# Patient Record
Sex: MALE | Race: BLACK OR AFRICAN AMERICAN | NOT HISPANIC OR LATINO | URBAN - METROPOLITAN AREA
[De-identification: names, ages, dates, MRNs, and addresses within clinical notes are randomized per-mention and may not be internally consistent; named-entity substitution may affect disease eponyms.]

---

## 2020-09-17 ENCOUNTER — OUTPATIENT (OUTPATIENT)
Dept: OUTPATIENT SERVICES | Facility: HOSPITAL | Age: 20
LOS: 1 days | Discharge: STILL ADMITTED OR ADMINISTRATIVE | End: 2020-09-17
Payer: COMMERCIAL

## 2020-09-17 PROCEDURE — 90839 PSYTX CRISIS INITIAL 60 MIN: CPT

## 2020-09-18 DIAGNOSIS — F29 UNSPECIFIED PSYCHOSIS NOT DUE TO A SUBSTANCE OR KNOWN PHYSIOLOGICAL CONDITION: ICD-10-CM

## 2020-09-18 DIAGNOSIS — F12.20 CANNABIS DEPENDENCE, UNCOMPLICATED: ICD-10-CM

## 2020-09-25 PROCEDURE — 99214 OFFICE O/P EST MOD 30 MIN: CPT

## 2021-02-12 PROCEDURE — 90839 PSYTX CRISIS INITIAL 60 MIN: CPT

## 2021-02-16 ENCOUNTER — OUTPATIENT (OUTPATIENT)
Dept: OUTPATIENT SERVICES | Facility: HOSPITAL | Age: 21
LOS: 1 days | Discharge: STILL ADMITTED OR ADMINISTRATIVE | End: 2021-02-16
Payer: COMMERCIAL

## 2021-02-16 PROCEDURE — 90839 PSYTX CRISIS INITIAL 60 MIN: CPT

## 2021-02-17 DIAGNOSIS — F20.9 SCHIZOPHRENIA, UNSPECIFIED: ICD-10-CM

## 2021-02-23 ENCOUNTER — OUTPATIENT (OUTPATIENT)
Dept: OUTPATIENT SERVICES | Facility: HOSPITAL | Age: 21
LOS: 1 days | Discharge: HOME | End: 2021-02-23

## 2021-02-23 PROCEDURE — 90792 PSYCH DIAG EVAL W/MED SRVCS: CPT | Mod: 95

## 2021-02-24 DIAGNOSIS — F29 UNSPECIFIED PSYCHOSIS NOT DUE TO A SUBSTANCE OR KNOWN PHYSIOLOGICAL CONDITION: ICD-10-CM

## 2022-02-10 ENCOUNTER — INPATIENT (INPATIENT)
Facility: HOSPITAL | Age: 22
LOS: 13 days | Discharge: HOME | End: 2022-02-24
Attending: PSYCHIATRY & NEUROLOGY | Admitting: PSYCHIATRY & NEUROLOGY
Payer: COMMERCIAL

## 2022-02-10 VITALS — TEMPERATURE: 99 F | HEIGHT: 75 IN | WEIGHT: 315 LBS

## 2022-02-10 DIAGNOSIS — F29 UNSPECIFIED PSYCHOSIS NOT DUE TO A SUBSTANCE OR KNOWN PHYSIOLOGICAL CONDITION: ICD-10-CM

## 2022-02-10 LAB
FLUAV AG NPH QL: SIGNIFICANT CHANGE UP
FLUBV AG NPH QL: SIGNIFICANT CHANGE UP
RSV RNA NPH QL NAA+NON-PROBE: SIGNIFICANT CHANGE UP
SARS-COV-2 RNA SPEC QL NAA+PROBE: SIGNIFICANT CHANGE UP

## 2022-02-10 PROCEDURE — 99222 1ST HOSP IP/OBS MODERATE 55: CPT

## 2022-02-10 RX ORDER — OLANZAPINE 15 MG/1
10 TABLET, FILM COATED ORAL ONCE
Refills: 0 | Status: DISCONTINUED | OUTPATIENT
Start: 2022-02-10 | End: 2022-02-11

## 2022-02-10 RX ORDER — TRAZODONE HCL 50 MG
100 TABLET ORAL AT BEDTIME
Refills: 0 | Status: DISCONTINUED | OUTPATIENT
Start: 2022-02-10 | End: 2022-02-11

## 2022-02-10 RX ORDER — OLANZAPINE 15 MG/1
5 TABLET, FILM COATED ORAL EVERY 4 HOURS
Refills: 0 | Status: DISCONTINUED | OUTPATIENT
Start: 2022-02-10 | End: 2022-02-24

## 2022-02-10 RX ORDER — GABAPENTIN 400 MG/1
300 CAPSULE ORAL
Refills: 0 | Status: DISCONTINUED | OUTPATIENT
Start: 2022-02-10 | End: 2022-02-16

## 2022-02-10 RX ORDER — ARIPIPRAZOLE 15 MG/1
10 TABLET ORAL AT BEDTIME
Refills: 0 | Status: DISCONTINUED | OUTPATIENT
Start: 2022-02-10 | End: 2022-02-11

## 2022-02-10 RX ADMIN — Medication 2 MILLIGRAM(S): at 22:22

## 2022-02-10 RX ADMIN — Medication 100 MILLIGRAM(S): at 22:22
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
## 2022-02-10 NOTE — BH INPATIENT PSYCHIATRY ASSESSMENT NOTE - NSBHASSESSSUMMFT_PSY_ALL_CORE
22 y/o young man, single, unemployed, living with mother and cousin; no known PMH; PPhx of schizoaffective disorder and cannabis use disorder, [facility], no past suicide attempts, no past inpatient hospitalizations, previous trials on Abilify 5 mg and risperidone 1 mg BID although self-d/c'ed (patient had relief on risperidone), history of marijuana use with last use one year ago, history of aggression toward property referred [facility] for inability to care for self 2/2 psychotic sxs and imminent risk of harm 2/2 poor judgment from psychotic sxs.    Patient is presenting with signs and symptoms consistent with manic episode with psychotic features. On exam, patient is hyperverbal, tangential, with elevated affect, expressing grandiose statements. Patient also endorses decreased need for sleep. Patient is also disorganized with expressions of paranoia, ideas of reference, and auditory hallucinations. Symptoms are occurring in the setting of medication nonadherence with patient continuing to express hesitance toward medication initiation. Patient would benefit from initiation of Abilify for antipsychotic and mood stabilizing properties with option for DELONG, considering patient's history of medication nonadherence.    Plan:  1.	Legal: continue 9/13 status  2.	Safety: routine obs appropriate as pt denying active SI/HI; Zyprexa 5mg PO q6h PRN agitation, ativan 2mg q6h PO  PRN agitation, Zyprexa 10mg IM PRN severe agitation  3.	Psychiatric: Start Abilify 10mg QHS for psychosis, Start trazodone 100mg QHS for insomnia; PRN's: Neurontin 300mg PO QID PRN anxiety  4.	G/M therapy   5.	Medical: no acute issues; F/u basic AM labs and EKG  6.	Collateral/Dispo: Dispo when stable; Collateral can be obtained from Dr. Blanchard 20 y/o young man, single, unemployed, living with mother and cousin; no known PMH; PPhx of schizoaffective disorder and cannabis use disorder, [facility], no past suicide attempts, no past inpatient hospitalizations, previous trials on Abilify 5 mg and risperidone 1 mg BID although self-d/c'ed (patient had relief on risperidone), history of marijuana use with last use one year ago, history of aggression toward property [facility] for inability to care for self 2/2 psychotic sxs and imminent risk of harm 2/2 poor judgment from psychotic sxs.    Patient is presenting with signs and symptoms consistent with manic episode with psychotic features. On exam, patient is hyperverbal, tangential, with elevated affect, expressing grandiose statements. Patient also endorses decreased need for sleep. Patient is also disorganized with expressions of paranoia, ideas of reference, and auditory hallucinations. Symptoms are occurring in the setting of medication nonadherence with patient continuing to express hesitance toward medication initiation. Patient would benefit from initiation of Abilify for antipsychotic and mood stabilizing properties with option for DELONG, considering patient's history of medication nonadherence.    Plan:  1.	Legal: pt would benefit from VOL 9.13 status  2.	Safety: routine obs appropriate as pt denying active SI/HI; Zyprexa 5mg PO q6h PRN agitation, ativan 2mg q6h PO  PRN agitation, Zyprexa 10mg IM PRN severe agitation  3.	Psychiatric: Start Abilify 10mg QHS for psychosis as pt refusing risperdal home med, Start trazodone 100mg QHS for insomnia;  goal of DELONG for safety and compliance;  PRN's: Neurontin 300mg PO QID PRN anxiety  4.	G/M therapy   5.	Medical: no acute issues; F/u basic AM labs and EKG  6.	Collateral/Dispo: Dispo when stable; Collateral was obtained from Dr. Blanchard in [facility]

## 2022-02-10 NOTE — BH INPATIENT PSYCHIATRY ASSESSMENT NOTE - NSBHCHARTREVIEWVS_PSY_A_CORE FT
Vital Signs Last 24 Hrs  T(C): 37.1 (02-10-22 @ 19:40), Max: 37.1 (02-10-22 @ 19:40)  T(F): 98.7 (02-10-22 @ 19:40), Max: 98.7 (02-10-22 @ 19:40)  HR: --  BP: --  BP(mean): --  RR: --  SpO2: --    Orthostatic VS  02-10-22 @ 19:40  Lying BP: --/-- HR: --  Sitting BP: 149/82 HR: 98  Standing BP: 148/92 HR: 89  Site: --  Mode: --

## 2022-02-10 NOTE — BH INPATIENT PSYCHIATRY ASSESSMENT NOTE - DESCRIPTION
Patient currently lives with mother and cousin in [place]. He graduated high school. Patient moved around growing up, states he lived in [place], at age 12 moved south to live with uncle due to mother having psychotic episodes, [place] age 15. States he had friends growing up. States he got C's in high school. Was going to attend community college but decided not to because he wanted to pursue music. Used to work at [employer].
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
## 2022-02-10 NOTE — BH INPATIENT PSYCHIATRY ASSESSMENT NOTE - PREPARATORY ACTS:
VITAL SIGNS: I have reviewed nursing notes and confirm.  CONSTITUTIONAL: Well-developed; well-nourished; in no acute distress.  SKIN: Skin exam is warm and dry, no acute rash.  HEAD: Normocephalic; atraumatic.  EYES: Conjunctiva and sclera clear.  ENT: No nasal discharge; airway clear. Oropharynx clear. Uvula midline.   NECK: Supple; non tender.  CARD: S1, S2 normal; no murmurs, gallops, or rubs. Tachycardic, regular.   RESP: No wheezes, rales or rhonchi. Speaking in full sentences.   ABD: Normal bowel sounds; soft; non-distended; non-tender; No rebound or guarding. No CVA tenderness.  EXT: Normal ROM. No clubbing, cyanosis or edema.  NEURO: Alert, oriented. Grossly unremarkable. No focal deficits. None known

## 2022-02-10 NOTE — BH INPATIENT PSYCHIATRY ASSESSMENT NOTE - VIOLENCE RISK FACTORS:
Feeling of being under threat and being unable to control threat/Affective dysregulation/Impulsivity/Lack of insight into violence risk/need for treatment/Irritability
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
## 2022-02-10 NOTE — BH INPATIENT PSYCHIATRY ASSESSMENT NOTE - CURRENT MEDICATION
MEDICATIONS  (STANDING):    MEDICATIONS  (PRN):   MEDICATIONS  (STANDING):  ARIPiprazole 10 milliGRAM(s) Oral at bedtime  traZODone 100 milliGRAM(s) Oral at bedtime    MEDICATIONS  (PRN):  gabapentin 300 milliGRAM(s) Oral four times a day PRN anxiety  LORazepam     Tablet 2 milliGRAM(s) Oral every 4 hours PRN severe anxiety or agitation  OLANZapine 5 milliGRAM(s) Oral every 4 hours PRN severe agitation or aggression  OLANZapine Injectable 10 milliGRAM(s) IntraMuscular once PRN severe agitation or aggression

## 2022-02-10 NOTE — BH INPATIENT PSYCHIATRY ASSESSMENT NOTE - MSE UNSTRUCTURED FT
The patient appears stated age, fair hygiene and dressed appropriately.  The patient was irritable at times, guarded with the interview and maintained appropriate eye contact with odd relatedness.  No psychomotor agitation or retardation noted, no abnormal movements.  Steady gait observed.  The patient's speech was hyperverbal, normal in tone, fast rate, and increased volume.  The patient's mood is "good."  Affect is elevated, inappropriate.  Thought process is disorganized, illogical at times, tangential and circumstantial at times. Thought content notable for paranoia, ideas of reference. Denies any suicidal or homicidal ideation, intent, or plan. Denies hallucinations.  Cognition AAOx3. Insight is poor.  Judgment is poor.

## 2022-02-11 LAB
A1C WITH ESTIMATED AVERAGE GLUCOSE RESULT: 5.9 % — HIGH (ref 4–5.6)
ALBUMIN SERPL ELPH-MCNC: 4.7 G/DL — SIGNIFICANT CHANGE UP (ref 3.3–5)
ALP SERPL-CCNC: 92 U/L — SIGNIFICANT CHANGE UP (ref 40–120)
ALT FLD-CCNC: 30 U/L — SIGNIFICANT CHANGE UP (ref 4–41)
ANION GAP SERPL CALC-SCNC: 15 MMOL/L — HIGH (ref 7–14)
ANISOCYTOSIS BLD QL: SLIGHT — SIGNIFICANT CHANGE UP
AST SERPL-CCNC: 21 U/L — SIGNIFICANT CHANGE UP (ref 4–40)
B PERT DNA SPEC QL NAA+PROBE: SIGNIFICANT CHANGE UP
B PERT+PARAPERT DNA PNL SPEC NAA+PROBE: SIGNIFICANT CHANGE UP
BASOPHILS # BLD AUTO: 0.05 K/UL — SIGNIFICANT CHANGE UP (ref 0–0.2)
BASOPHILS NFR BLD AUTO: 0.6 % — SIGNIFICANT CHANGE UP (ref 0–2)
BILIRUB SERPL-MCNC: 1 MG/DL — SIGNIFICANT CHANGE UP (ref 0.2–1.2)
BORDETELLA PARAPERTUSSIS (RAPRVP): SIGNIFICANT CHANGE UP
BUN SERPL-MCNC: 18 MG/DL — SIGNIFICANT CHANGE UP (ref 7–23)
C PNEUM DNA SPEC QL NAA+PROBE: SIGNIFICANT CHANGE UP
CALCIUM SERPL-MCNC: 10 MG/DL — SIGNIFICANT CHANGE UP (ref 8.4–10.5)
CHLORIDE SERPL-SCNC: 96 MMOL/L — LOW (ref 98–107)
CHOLEST SERPL-MCNC: 267 MG/DL — HIGH
CO2 SERPL-SCNC: 22 MMOL/L — SIGNIFICANT CHANGE UP (ref 22–31)
CREAT SERPL-MCNC: 1.01 MG/DL — SIGNIFICANT CHANGE UP (ref 0.5–1.3)
EOSINOPHIL # BLD AUTO: 0.2 K/UL — SIGNIFICANT CHANGE UP (ref 0–0.5)
EOSINOPHIL NFR BLD AUTO: 2.5 % — SIGNIFICANT CHANGE UP (ref 0–6)
ESTIMATED AVERAGE GLUCOSE: 123 — SIGNIFICANT CHANGE UP
FLUAV SUBTYP SPEC NAA+PROBE: SIGNIFICANT CHANGE UP
FLUBV RNA SPEC QL NAA+PROBE: SIGNIFICANT CHANGE UP
GLUCOSE SERPL-MCNC: 149 MG/DL — HIGH (ref 70–99)
HADV DNA SPEC QL NAA+PROBE: SIGNIFICANT CHANGE UP
HCOV 229E RNA SPEC QL NAA+PROBE: SIGNIFICANT CHANGE UP
HCOV HKU1 RNA SPEC QL NAA+PROBE: SIGNIFICANT CHANGE UP
HCOV NL63 RNA SPEC QL NAA+PROBE: SIGNIFICANT CHANGE UP
HCOV OC43 RNA SPEC QL NAA+PROBE: SIGNIFICANT CHANGE UP
HCT VFR BLD CALC: 47.7 % — SIGNIFICANT CHANGE UP (ref 39–50)
HDLC SERPL-MCNC: 48 MG/DL — SIGNIFICANT CHANGE UP
HGB BLD-MCNC: 15.7 G/DL — SIGNIFICANT CHANGE UP (ref 13–17)
HMPV RNA SPEC QL NAA+PROBE: SIGNIFICANT CHANGE UP
HPIV1 RNA SPEC QL NAA+PROBE: SIGNIFICANT CHANGE UP
HPIV2 RNA SPEC QL NAA+PROBE: SIGNIFICANT CHANGE UP
HPIV3 RNA SPEC QL NAA+PROBE: SIGNIFICANT CHANGE UP
HPIV4 RNA SPEC QL NAA+PROBE: SIGNIFICANT CHANGE UP
IANC: 4.22 K/UL — SIGNIFICANT CHANGE UP (ref 1.5–8.5)
IMM GRANULOCYTES NFR BLD AUTO: 0.1 % — SIGNIFICANT CHANGE UP (ref 0–1.5)
LIPID PNL WITH DIRECT LDL SERPL: 187 MG/DL — HIGH
LYMPHOCYTES # BLD AUTO: 3.17 K/UL — SIGNIFICANT CHANGE UP (ref 1–3.3)
LYMPHOCYTES # BLD AUTO: 38.8 % — SIGNIFICANT CHANGE UP (ref 13–44)
M PNEUMO DNA SPEC QL NAA+PROBE: SIGNIFICANT CHANGE UP
MANUAL SMEAR VERIFICATION: SIGNIFICANT CHANGE UP
MCHC RBC-ENTMCNC: 23.2 PG — LOW (ref 27–34)
MCHC RBC-ENTMCNC: 32.9 GM/DL — SIGNIFICANT CHANGE UP (ref 32–36)
MCV RBC AUTO: 70.5 FL — LOW (ref 80–100)
MICROCYTES BLD QL: SLIGHT — SIGNIFICANT CHANGE UP
MONOCYTES # BLD AUTO: 0.51 K/UL — SIGNIFICANT CHANGE UP (ref 0–0.9)
MONOCYTES NFR BLD AUTO: 6.3 % — SIGNIFICANT CHANGE UP (ref 2–14)
NEUTROPHILS # BLD AUTO: 4.22 K/UL — SIGNIFICANT CHANGE UP (ref 1.8–7.4)
NEUTROPHILS NFR BLD AUTO: 51.7 % — SIGNIFICANT CHANGE UP (ref 43–77)
NON HDL CHOLESTEROL: 219 MG/DL — HIGH
NRBC # BLD: 0 /100 WBCS — SIGNIFICANT CHANGE UP
NRBC # FLD: 0 K/UL — SIGNIFICANT CHANGE UP
PLAT MORPH BLD: NORMAL — SIGNIFICANT CHANGE UP
PLATELET # BLD AUTO: 370 K/UL — SIGNIFICANT CHANGE UP (ref 150–400)
PLATELET COUNT - ESTIMATE: NORMAL — SIGNIFICANT CHANGE UP
POTASSIUM SERPL-MCNC: 4.1 MMOL/L — SIGNIFICANT CHANGE UP (ref 3.5–5.3)
POTASSIUM SERPL-SCNC: 4.1 MMOL/L — SIGNIFICANT CHANGE UP (ref 3.5–5.3)
PROT SERPL-MCNC: 8.1 G/DL — SIGNIFICANT CHANGE UP (ref 6–8.3)
RAPID RVP RESULT: SIGNIFICANT CHANGE UP
RBC # BLD: 6.77 M/UL — HIGH (ref 4.2–5.8)
RBC # FLD: 17.4 % — HIGH (ref 10.3–14.5)
RBC BLD AUTO: ABNORMAL
RSV RNA SPEC QL NAA+PROBE: SIGNIFICANT CHANGE UP
RV+EV RNA SPEC QL NAA+PROBE: SIGNIFICANT CHANGE UP
SARS-COV-2 RNA SPEC QL NAA+PROBE: SIGNIFICANT CHANGE UP
SODIUM SERPL-SCNC: 133 MMOL/L — LOW (ref 135–145)
TRIGL SERPL-MCNC: 162 MG/DL — HIGH
WBC # BLD: 8.16 K/UL — SIGNIFICANT CHANGE UP (ref 3.8–10.5)
WBC # FLD AUTO: 8.16 K/UL — SIGNIFICANT CHANGE UP (ref 3.8–10.5)

## 2022-02-11 PROCEDURE — 93010 ELECTROCARDIOGRAM REPORT: CPT

## 2022-02-11 RX ORDER — RISPERIDONE 4 MG/1
1 TABLET ORAL
Refills: 0 | Status: DISCONTINUED | OUTPATIENT
Start: 2022-02-11 | End: 2022-02-12

## 2022-02-11 RX ORDER — INFLUENZA VIRUS VACCINE 15; 15; 15; 15 UG/.5ML; UG/.5ML; UG/.5ML; UG/.5ML
0.5 SUSPENSION INTRAMUSCULAR ONCE
Refills: 0 | Status: DISCONTINUED | OUTPATIENT
Start: 2022-02-11 | End: 2022-02-24

## 2022-02-11 RX ORDER — LANOLIN ALCOHOL/MO/W.PET/CERES
5 CREAM (GRAM) TOPICAL AT BEDTIME
Refills: 0 | Status: DISCONTINUED | OUTPATIENT
Start: 2022-02-11 | End: 2022-02-24

## 2022-02-11 RX ORDER — OLANZAPINE 15 MG/1
7.5 TABLET, FILM COATED ORAL ONCE
Refills: 0 | Status: DISCONTINUED | OUTPATIENT
Start: 2022-02-11 | End: 2022-02-24

## 2022-02-11 RX ORDER — TRAZODONE HCL 50 MG
100 TABLET ORAL AT BEDTIME
Refills: 0 | Status: DISCONTINUED | OUTPATIENT
Start: 2022-02-11 | End: 2022-02-24

## 2022-02-11 RX ADMIN — Medication 2 MILLIGRAM(S): at 21:38

## 2022-02-11 RX ADMIN — RISPERIDONE 1 MILLIGRAM(S): 4 TABLET ORAL at 21:38

## 2022-02-11 RX ADMIN — Medication 5 MILLIGRAM(S): at 21:38

## 2022-02-11 NOTE — PSYCHIATRIC REHAB INITIAL EVALUATION - NSBHALCSUBCHOICE_PSY_ALL_CORE
Pt reported that he used to smoke a lot of THC daily 2-3 blunts daily from age 14 to to age 18 and then 2-3 blunts 3 days/week until September 2020. Pt reported being sober since 2020.

## 2022-02-11 NOTE — BH INPATIENT PSYCHIATRY PROGRESS NOTE - NSBHCHARTREVIEWVS_PSY_A_CORE FT
Vital Signs Last 24 Hrs  T(C): 35.9 (02-11-22 @ 05:51), Max: 37.1 (02-10-22 @ 19:40)  T(F): 96.6 (02-11-22 @ 05:51), Max: 98.7 (02-10-22 @ 19:40)  HR: --  BP: --  BP(mean): --  RR: --  SpO2: --    Orthostatic VS  02-11-22 @ 08:05  Lying BP: --/-- HR: --  Sitting BP: 128/74 HR: 84  Standing BP: 124/60 HR: 92  Site: upper left arm  Mode: electronic  Orthostatic VS  02-10-22 @ 19:40  Lying BP: --/-- HR: --  Sitting BP: 149/82 HR: 98  Standing BP: 148/92 HR: 89  Site: --  Mode: --
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
## 2022-02-11 NOTE — PSYCHIATRIC REHAB INITIAL EVALUATION - NSBHPRRECOMMEND_PSY_ALL_CORE
Writer met with patient in order to orient patient to unit, and introduce patient to psychiatric rehabilitation staff and department functions. Patient was receptive. Patient was seen in the day area of the unit. Patient was engaged, cooperative and polite during the session. Patient was unable to identify his primary reason. Patient was vague regarding events that brought him to [facility].  Patient denied SI/HI/AH/VH. Patient denied feeling depress or anxious. Patient insight and judgment are poor. Over course of the session, patient became making more irrelevant comments,  hyperverbal, tangential, and expressed paranoia. Writer required multiple verbal redirections to keep the patient on topic. Patient was receptive.   Writer and patient established a collaborative treatment goal for the patient to work on over the next 7 days. Psychiatric rehabilitation staff will provide encouragement, support, psychotherapy, and psychoeducation to assist the patient in the progression of his treatment goal.

## 2022-02-11 NOTE — BH SOCIAL WORK INITIAL PSYCHOSOCIAL EVALUATION - OTHER PAST PSYCHIATRIC HISTORY (INCLUDE DETAILS REGARDING ONSET, COURSE OF ILLNESS, INPATIENT/OUTPATIENT TREATMENT)
Pt is a 20 y/o young man, single, unemployed, living with mother and cousin, diagnosed with schizoaffective disorder, [facility] 2021 for paranoid delusions, referential thinking, thought disorder, decline in functioning, and intermittent SI with low intent. Pt with PPHx of cannabis use disorder, no past suicide attempts, no past inpatient hospitalizations, no PMHx. Pt stopped taking medication in summer 2021 as he felt it was making him worse and since then has been minimally engaged in tx. Pt presents for unscheduled walk-in visit with mother with exacerbating paranoid delusions, possible auditory hallucinations, and continued withdrawal and decline in functioning. Pt unable to care for self, psychotic sxs, poor judgment from psychotic sxs. He is agreeable to voluntary hospitalization and will benefit from admission for safety, stabilization, and medication management.  Pt is a 22 y/o young man, single, unemployed, living with mother and cousin, diagnosed with schizoaffective disorder, [facility] in 2021 for paranoid delusions, referential thinking, thought disorder, decline in functioning, and intermittent SI with low intent. Pt with PPHx of cannabis use disorder, reports 1 SA via cutting wrist in 2021, no past inpatient hospitalizations, no PMHx. Pt stopped taking medication in summer 2021 as he felt it was making him worse and since then has been minimally engaged in tx. Pt presents for unscheduled walk-in visit with mother with exacerbating paranoid delusions, possible auditory hallucinations, and continued withdrawal and decline in functioning. Pt unable to care for self, psychotic sxs, poor judgment from psychotic sxs. He is agreeable to voluntary hospitalization and will benefit from admission for safety, stabilization, and medication management.

## 2022-02-11 NOTE — BH SOCIAL WORK INITIAL PSYCHOSOCIAL EVALUATION - NSBHSATHC_PSY_A_CORE FT
pt states he used to smoke a lot of THC daily- 2-3 blunts daily from age 14 to age 18 and then 2-3 blunts 3 days/week until September 2020
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
## 2022-02-11 NOTE — BH PATIENT PROFILE - FUNCTIONAL SCREEN CURRENT LEVEL: COMMUNICATION, MLM
0 = understands/communicates without difficulty No. LES screening performed.  STOP BANG Legend: 0-2 = LOW Risk; 3-4 = INTERMEDIATE Risk; 5-8 = HIGH Risk

## 2022-02-11 NOTE — BH INPATIENT PSYCHIATRY PROGRESS NOTE - NSBHASSESSSUMMFT_PSY_ALL_CORE
20 y/o young man, single, unemployed, living with mother and cousin; no known PMH; PPhx of schizoaffective disorder and cannabis use disorder, [facility], no past suicide attempts, no past inpatient hospitalizations, previous trials on Abilify 5 mg and risperidone 1 mg BID although self-d/c'ed (patient had relief on risperidone), history of marijuana use with last use one year ago, history of aggression toward [facility] for inability to care for self 2/2 psychotic sxs and imminent risk of harm 2/2 poor judgment from psychotic sxs.    Patient is presenting with signs and symptoms consistent with manic episode with psychotic features. On exam, patient is hyperverbal, tangential, with elevated affect, expressing grandiose statements. Patient also endorses decreased need for sleep. Patient is also disorganized with expressions of paranoia, ideas of reference, and auditory hallucinations. Symptoms are occurring in the setting of medication nonadherence with patient continuing to express hesitance toward medication initiation. Patient would benefit from initiation of Abilify for antipsychotic and mood stabilizing properties with option for DELONG, considering patient's history of medication nonadherence.    Plan:  1.	Legal: pt would benefit from VOL 9.13 status  2.	Safety: routine obs appropriate as pt denying active SI/HI; Zyprexa 5mg PO q6h PRN agitation, ativan 2mg q6h PO  PRN agitation, Zyprexa 10mg IM PRN severe agitation  3.	Psychiatric: Start Abilify 10mg QHS for psychosis as pt refusing risperdal home med, Start trazodone 100mg QHS for insomnia;  goal of DELONG for safety and compliance;  PRN's: Neurontin 300mg PO QID PRN anxiety  4.	G/M therapy   5.	Medical: no acute issues; F/u basic AM labs and EKG  6.	Collateral/Dispo: Dispo when stable; Collateral was obtained from Dr. Blanchard in [facility] 22 y/o young man, single, unemployed, living with mother and cousin; no known PMH; PPhx of schizoaffective disorder and cannabis use disorder, [facility], no past suicide attempts, no past inpatient hospitalizations, previous trials on Abilify 5 mg and risperidone 1 mg BID although self-d/c'ed (patient had relief on risperidone), history of marijuana use with last use one year ago, history of aggression toward property [facility] for inability to care for self 2/2 psychotic sxs and imminent risk of harm 2/2 poor judgment from psychotic sxs.    Patient is presenting with signs and symptoms consistent with manic episode with psychotic features. On exam, patient is hyperverbal, tangential, with elevated affect, expressing grandiose statements. Patient also endorses decreased need for sleep. Patient is also disorganized with expressions of paranoia, ideas of reference, and auditory hallucinations. Symptoms are occurring in the setting of medication nonadherence with patient continuing to express hesitance toward medication initiation. Patient will be restarted on Risperdal; Ativan will be initiated for psychotic agitation/cristine.    Plan:  1.	Legal: pt would benefit from VOL 9.13 status  2.	Safety: routine obs appropriate as pt denying active SI/HI; Zyprexa 5mg PO q6h PRN agitation, ativan 2mg q6h PO  PRN agitation, Zyprexa 10mg IM PRN severe agitation  3.	Psychiatric: Risperdal 1mg BID, Ativan 2mg TID; melatonin 5mg; trazodone 100mg PRN for insomnia  PRN's: Neurontin 300mg PO QID PRN anxiety  4.	G/M therapy   5.	Medical: no acute issues; F/u basic AM labs and EKG  6.	Collateral/Dispo: Dispo when stable; Collateral was obtained from Dr. Blanchard in [facility]

## 2022-02-11 NOTE — BH SOCIAL WORK INITIAL PSYCHOSOCIAL EVALUATION - TRANSPORTATION
Infusion Nursing Note:  Dhruv Villalba presents today for labs, possible transfusions.    Patient seen by provider today: No   present during visit today: Not Applicable.    Note: N/A.      Intravenous Access:  Lab draw site lac, Needle type bf, Gauge 23.  Labs drawn without difficulty.  Peripheral IV previously placed.    Treatment Conditions:  Lab Results   Component Value Date    HGB 8.1 08/05/2021    HGB 9.7 07/10/2021     Lab Results   Component Value Date    WBC 1.1 08/05/2021    WBC 2.7 07/10/2021      Lab Results   Component Value Date    ANEU 0.5 08/05/2021    ANEU 0.6 07/10/2021     Lab Results   Component Value Date    PLT 11 08/05/2021    PLT 22 07/10/2021      Blood transfusion consent signed 6/17/21.      Post Infusion Assessment:  Patient tolerated infusion without incident.  Site patent and intact, free from redness, edema or discomfort.  No evidence of extravasations.       Discharge Plan:   AVS to patient via MYCHART.  Patient will return 8/6 for next appointment.   Patient discharged in stable condition accompanied by: self.  Departure Mode: Ambulatory.      Barrie Crawley RN                      
no

## 2022-02-11 NOTE — BH SOCIAL WORK INITIAL PSYCHOSOCIAL EVALUATION - NSCMSPTSTRENGTHS_PSY_ALL_CORE
Physically healthy/Supportive family Able to adapt/Expressive of emotions/Future/goal oriented/Physically healthy/Supportive family

## 2022-02-12 PROCEDURE — 99232 SBSQ HOSP IP/OBS MODERATE 35: CPT

## 2022-02-12 RX ORDER — RISPERIDONE 4 MG/1
1 TABLET ORAL DAILY
Refills: 0 | Status: DISCONTINUED | OUTPATIENT
Start: 2022-02-13 | End: 2022-02-15

## 2022-02-12 RX ORDER — RISPERIDONE 4 MG/1
2 TABLET ORAL AT BEDTIME
Refills: 0 | Status: DISCONTINUED | OUTPATIENT
Start: 2022-02-12 | End: 2022-02-15

## 2022-02-12 RX ADMIN — Medication 2 MILLIGRAM(S): at 20:41

## 2022-02-12 RX ADMIN — RISPERIDONE 2 MILLIGRAM(S): 4 TABLET ORAL at 20:41

## 2022-02-12 RX ADMIN — Medication 5 MILLIGRAM(S): at 21:37

## 2022-02-12 RX ADMIN — RISPERIDONE 1 MILLIGRAM(S): 4 TABLET ORAL at 09:44

## 2022-02-12 RX ADMIN — Medication 2 MILLIGRAM(S): at 09:44

## 2022-02-12 NOTE — BH INPATIENT PSYCHIATRY PROGRESS NOTE - NSBHASSESSSUMMFT_PSY_ALL_CORE
22 y/o young man, single, unemployed, living with mother and cousin; no known PMH; PPhx of schizoaffective disorder and cannabis use disorder, [facility], no past suicide attempts, no past inpatient hospitalizations, previous trials on Abilify 5 mg and risperidone 1 mg BID although self-d/c'ed (patient had relief on risperidone), history of marijuana use with last use one year ago, history of aggression toward property [facility] for inability to care for self 2/2 psychotic sxs and imminent risk of harm 2/2 poor judgment from psychotic sxs.    Patient is presenting with signs and symptoms consistent with manic episode with psychotic features. On exam, patient is hyperverbal, tangential, with elevated affect, expressing grandiose statements. Patient also endorses decreased need for sleep. Patient is also disorganized with expressions of paranoia, ideas of reference, and auditory hallucinations. Symptoms are occurring in the setting of medication nonadherence with patient continuing to express hesitance toward medication initiation. Patient will be restarted on Risperdal; Ativan will be initiated for psychotic agitation/cristine.    Plan:  1.	Legal: pt would benefit from VOL 9.13 status  2.	Safety: routine obs appropriate as pt denying active SI/HI; Zyprexa 5mg PO q6h PRN agitation, ativan 2mg q6h PO  PRN agitation, Zyprexa 10mg IM PRN severe agitation  3.	Psychiatric: Risperdal titrated to 1mg PO daily and 2mg PO at bedtime, Ativan 2mg TID; melatonin 5mg; trazodone 100mg PRN for insomnia  PRN's: Neurontin 300mg PO QID PRN anxiety  4.	G/M therapy   5.	Medical: no acute issues; F/u basic AM labs and EKG  6.	Collateral/Dispo: Dispo when stable; Collateral was obtained from Dr. Blanchard in [place]

## 2022-02-12 NOTE — BH INPATIENT PSYCHIATRY PROGRESS NOTE - NSBHFUPINTERVALHXFT_PSY_A_CORE
Pt compliant with medication and tolerating it well.  Chart reviewed and case discussed with nursing.  No events reported overnight.  Pt in his bedroom in the dark, in a chair staring at the wall.  When asked what he was doing, he reported he was "being to myself," and unable to elaborate.  Pt reports intermittent sleep last night, good appetite.  Pt denies SI/HI/I/P or AH/VH, admits to paranoia.

## 2022-02-12 NOTE — BH INPATIENT PSYCHIATRY PROGRESS NOTE - NSBHCHARTREVIEWVS_PSY_A_CORE FT
Vital Signs Last 24 Hrs  T(C): 36.8 (02-12-22 @ 06:48), Max: 36.8 (02-12-22 @ 06:48)  T(F): 98.2 (02-12-22 @ 06:48), Max: 98.2 (02-12-22 @ 06:48)  HR: --  BP: --  BP(mean): --  RR: 18 (02-11-22 @ 19:00) (18 - 18)  SpO2: --    Orthostatic VS  02-12-22 @ 06:48  Lying BP: --/-- HR: --  Sitting BP: 150/89 HR: 90  Standing BP: 149/88 HR: 96  Site: upper left arm  Mode: electronic  Orthostatic VS  02-11-22 @ 19:00  Lying BP: --/-- HR: --  Sitting BP: 150/76 HR: 107  Standing BP: 132/68 HR: 113  Site: --  Mode: --  Orthostatic VS  02-11-22 @ 08:05  Lying BP: --/-- HR: --  Sitting BP: 128/74 HR: 84  Standing BP: 124/60 HR: 92  Site: upper left arm  Mode: electronic  Orthostatic VS  02-10-22 @ 19:40  Lying BP: --/-- HR: --  Sitting BP: 149/82 HR: 98  Standing BP: 148/92 HR: 89  Site: --  Mode: --

## 2022-02-13 PROCEDURE — 99231 SBSQ HOSP IP/OBS SF/LOW 25: CPT

## 2022-02-13 RX ADMIN — RISPERIDONE 2 MILLIGRAM(S): 4 TABLET ORAL at 20:00

## 2022-02-13 RX ADMIN — Medication 5 MILLIGRAM(S): at 20:01

## 2022-02-13 RX ADMIN — Medication 2 MILLIGRAM(S): at 20:01

## 2022-02-13 RX ADMIN — RISPERIDONE 1 MILLIGRAM(S): 4 TABLET ORAL at 10:10

## 2022-02-13 RX ADMIN — Medication 2 MILLIGRAM(S): at 10:10

## 2022-02-13 NOTE — BH INPATIENT PSYCHIATRY PROGRESS NOTE - NSBHFUPINTERVALHXFT_PSY_A_CORE
Pt compliant with medication and tolerating it well.  No events reported overnight.  Pt observed in group today.  Pt agrees to interview.  Pt reports he feels his anger issues are gone.  Pt reports he has been getting along with other patients, but feels he needs more "stimulus" on the unit.  Pt denies SI/HI/I/P or AH/VH.  Pt reports eating and sleeping well.

## 2022-02-13 NOTE — BH INPATIENT PSYCHIATRY PROGRESS NOTE - NSBHCHARTREVIEWVS_PSY_A_CORE FT
Vital Signs Last 24 Hrs  T(C): 36.2 (02-13-22 @ 06:26), Max: 36.8 (02-12-22 @ 20:36)  T(F): 97.1 (02-13-22 @ 06:26), Max: 98.3 (02-12-22 @ 20:36)  HR: --  BP: --  BP(mean): --  RR: 18 (02-12-22 @ 20:36) (18 - 18)  SpO2: --    Orthostatic VS  02-13-22 @ 10:14  Lying BP: --/-- HR: --  Sitting BP: 149/82 HR: 114  Standing BP: 146/85 HR: 110  Site: --  Mode: --  Orthostatic VS  02-12-22 @ 20:36  Lying BP: --/-- HR: --  Sitting BP: 147/94 HR: 112  Standing BP: 145/99 HR: 104  Site: --  Mode: --  Orthostatic VS  02-12-22 @ 06:48  Lying BP: --/-- HR: --  Sitting BP: 150/89 HR: 90  Standing BP: 149/88 HR: 96  Site: upper left arm  Mode: electronic  Orthostatic VS  02-11-22 @ 19:00  Lying BP: --/-- HR: --  Sitting BP: 150/76 HR: 107  Standing BP: 132/68 HR: 113  Site: --  Mode: --

## 2022-02-13 NOTE — BH INPATIENT PSYCHIATRY PROGRESS NOTE - NSBHASSESSSUMMFT_PSY_ALL_CORE
20 y/o young man, single, unemployed, living with mother and cousin; no known PMH; PPhx of schizoaffective disorder and cannabis use disorder, [facility], no past suicide attempts, no past inpatient hospitalizations, previous trials on Abilify 5 mg and risperidone 1 mg BID although self-d/c'ed (patient had relief on risperidone), history of marijuana use with last use one year ago, history of aggression toward property [facility] for inability to care for self 2/2 psychotic sxs and imminent risk of harm 2/2 poor judgment from psychotic sxs.    Patient is presenting with signs and symptoms consistent with manic episode with psychotic features. On exam, patient is hyperverbal, tangential, with elevated affect, expressing grandiose statements. Patient also endorses decreased need for sleep. Patient is also disorganized with expressions of paranoia, ideas of reference, and auditory hallucinations. Symptoms are occurring in the setting of medication nonadherence with patient continuing to express hesitance toward medication initiation. Patient will be restarted on Risperdal; Ativan will be initiated for psychotic agitation/cristine.    Plan:  1.	Legal: pt would benefit from VOL 9.13 status  2.	Safety: routine obs appropriate as pt denying active SI/HI; Zyprexa 5mg PO q6h PRN agitation, ativan 2mg q6h PO  PRN agitation, Zyprexa 10mg IM PRN severe agitation  3.	Psychiatric: Risperdal titrated to 1mg PO daily and 2mg PO at bedtime, Ativan 2mg TID; melatonin 5mg; trazodone 100mg PRN for insomnia  PRN's: Neurontin 300mg PO QID PRN anxiety  4.	G/M therapy   5.	Medical: no acute issues; F/u basic AM labs and EKG  6.	Collateral/Dispo: Dispo when stable; Collateral was obtained from Dr. Blanchard in [place]

## 2022-02-14 LAB
ALBUMIN SERPL ELPH-MCNC: 4.8 G/DL — SIGNIFICANT CHANGE UP (ref 3.3–5)
ALP SERPL-CCNC: 85 U/L — SIGNIFICANT CHANGE UP (ref 40–120)
ALT FLD-CCNC: 28 U/L — SIGNIFICANT CHANGE UP (ref 4–41)
ANION GAP SERPL CALC-SCNC: 14 MMOL/L — SIGNIFICANT CHANGE UP (ref 7–14)
AST SERPL-CCNC: 16 U/L — SIGNIFICANT CHANGE UP (ref 4–40)
BILIRUB SERPL-MCNC: 0.6 MG/DL — SIGNIFICANT CHANGE UP (ref 0.2–1.2)
BUN SERPL-MCNC: 12 MG/DL — SIGNIFICANT CHANGE UP (ref 7–23)
CALCIUM SERPL-MCNC: 9.7 MG/DL — SIGNIFICANT CHANGE UP (ref 8.4–10.5)
CHLORIDE SERPL-SCNC: 103 MMOL/L — SIGNIFICANT CHANGE UP (ref 98–107)
CO2 SERPL-SCNC: 23 MMOL/L — SIGNIFICANT CHANGE UP (ref 22–31)
CREAT SERPL-MCNC: 1.01 MG/DL — SIGNIFICANT CHANGE UP (ref 0.5–1.3)
GLUCOSE SERPL-MCNC: 131 MG/DL — HIGH (ref 70–99)
POTASSIUM SERPL-MCNC: 4.3 MMOL/L — SIGNIFICANT CHANGE UP (ref 3.5–5.3)
POTASSIUM SERPL-SCNC: 4.3 MMOL/L — SIGNIFICANT CHANGE UP (ref 3.5–5.3)
PROT SERPL-MCNC: 7.6 G/DL — SIGNIFICANT CHANGE UP (ref 6–8.3)
SODIUM SERPL-SCNC: 140 MMOL/L — SIGNIFICANT CHANGE UP (ref 135–145)
TSH SERPL-MCNC: 1.48 UIU/ML — SIGNIFICANT CHANGE UP (ref 0.27–4.2)

## 2022-02-14 PROCEDURE — 99231 SBSQ HOSP IP/OBS SF/LOW 25: CPT

## 2022-02-14 RX ADMIN — Medication 2 MILLIGRAM(S): at 08:43

## 2022-02-14 RX ADMIN — Medication 2 MILLIGRAM(S): at 12:18

## 2022-02-14 RX ADMIN — RISPERIDONE 1 MILLIGRAM(S): 4 TABLET ORAL at 08:42

## 2022-02-14 RX ADMIN — RISPERIDONE 2 MILLIGRAM(S): 4 TABLET ORAL at 20:25

## 2022-02-14 RX ADMIN — Medication 2 MILLIGRAM(S): at 20:25

## 2022-02-14 RX ADMIN — Medication 5 MILLIGRAM(S): at 20:24

## 2022-02-14 NOTE — BH INPATIENT PSYCHIATRY PROGRESS NOTE - NSBHFUPINTERVALHXFT_PSY_A_CORE
Patient is follow up for psychosis.  Patient is discussed in treatment team.  Per nursing report pt compliant with medication and tolerating it well.  No events reported over the weekend.  Pt has been attending  group therapy over the weekend.  Pt agrees to interview.  Pt reports feeling he feels his anger issues are gone. Pt denies SI/SIB/HI, no plan or intent. No mention of sleep disturbances or appetite concerns.  Patient is scheduled for repeat  CMP and TSH.   Patient is follow up for psychosis.  Patient is discussed in treatment team.  Per nursing report pt compliant with medication and tolerating it well.  No events reported over the weekend.  Pt has been attending  group therapy over the weekend.  Pt agrees to interview.  Pt reports feeling he feels his anger issues are gone. Pt denies SI/SIB/HI, no plan or intent. Patient still delusional, believes Princess Roche is his girlfriend.  No mention of sleep disturbances or appetite concerns.  Patient is scheduled for repeat  CMP and TSH, (CMP WNL, TSH pending.)      Addendum: Patient reports he wants to be discharged and submitted a 3 day letter. Discussed with attending possible need to convert to involuntary status

## 2022-02-14 NOTE — BH INPATIENT PSYCHIATRY PROGRESS NOTE - NSBHCHARTREVIEWVS_PSY_A_CORE FT
Vital Signs Last 24 Hrs  T(C): 36.7 (02-14-22 @ 06:18), Max: 36.8 (02-13-22 @ 14:41)  T(F): 98 (02-14-22 @ 06:18), Max: 98.2 (02-13-22 @ 14:41)  HR: --  BP: --  BP(mean): --  RR: --  SpO2: --    Orthostatic VS  02-13-22 @ 19:40  Lying BP: --/-- HR: --  Sitting BP: 130/78 HR: 101  Standing BP: 144/70 HR: 117  Site: --  Mode: --  Orthostatic VS  02-13-22 @ 10:14  Lying BP: --/-- HR: --  Sitting BP: 149/82 HR: 114  Standing BP: 146/85 HR: 110  Site: --  Mode: --  Orthostatic VS  02-12-22 @ 20:36  Lying BP: --/-- HR: --  Sitting BP: 147/94 HR: 112  Standing BP: 145/99 HR: 104  Site: --  Mode: --   Vital Signs Last 24 Hrs  T(C): 36.7 (02-14-22 @ 06:18), Max: 36.8 (02-13-22 @ 14:41)  T(F): 98 (02-14-22 @ 06:18), Max: 98.2 (02-13-22 @ 14:41)  HR: --  BP: --  BP(mean): --  RR: --  SpO2: --    Orthostatic VS  02-14-22 @ 08:48  Lying BP: --/-- HR: --  Sitting BP: 139/69 HR: 86  Standing BP: 137/85 HR: 97  Site: --  Mode: --  Orthostatic VS  02-13-22 @ 19:40  Lying BP: --/-- HR: --  Sitting BP: 130/78 HR: 101  Standing BP: 144/70 HR: 117  Site: --  Mode: --  Orthostatic VS  02-13-22 @ 10:14  Lying BP: --/-- HR: --  Sitting BP: 149/82 HR: 114  Standing BP: 146/85 HR: 110  Site: --  Mode: --  Orthostatic VS  02-12-22 @ 20:36  Lying BP: --/-- HR: --  Sitting BP: 147/94 HR: 112  Standing BP: 145/99 HR: 104  Site: --  Mode: --

## 2022-02-15 LAB — SARS-COV-2 RNA SPEC QL NAA+PROBE: SIGNIFICANT CHANGE UP

## 2022-02-15 PROCEDURE — 99231 SBSQ HOSP IP/OBS SF/LOW 25: CPT

## 2022-02-15 RX ORDER — RISPERIDONE 4 MG/1
2 TABLET ORAL
Refills: 0 | Status: DISCONTINUED | OUTPATIENT
Start: 2022-02-15 | End: 2022-02-24

## 2022-02-15 RX ORDER — DIVALPROEX SODIUM 500 MG/1
500 TABLET, DELAYED RELEASE ORAL
Refills: 0 | Status: DISCONTINUED | OUTPATIENT
Start: 2022-02-15 | End: 2022-02-16

## 2022-02-15 RX ADMIN — Medication 2 MILLIGRAM(S): at 12:40

## 2022-02-15 RX ADMIN — RISPERIDONE 2 MILLIGRAM(S): 4 TABLET ORAL at 20:34

## 2022-02-15 RX ADMIN — DIVALPROEX SODIUM 500 MILLIGRAM(S): 500 TABLET, DELAYED RELEASE ORAL at 20:34

## 2022-02-15 RX ADMIN — RISPERIDONE 1 MILLIGRAM(S): 4 TABLET ORAL at 10:23

## 2022-02-15 RX ADMIN — Medication 2 MILLIGRAM(S): at 10:23

## 2022-02-15 RX ADMIN — Medication 5 MILLIGRAM(S): at 20:34

## 2022-02-15 RX ADMIN — Medication 1.5 MILLIGRAM(S): at 20:34

## 2022-02-15 NOTE — BH INPATIENT PSYCHIATRY PROGRESS NOTE - NSBHASSESSSUMMFT_PSY_ALL_CORE
20 y/o young man, single, unemployed, living with mother and cousin; no known PMH; PPhx of schizoaffective disorder and cannabis use disorder, [facility], no past suicide attempts, no past inpatient hospitalizations, previous trials on Abilify 5 mg and risperidone 1 mg BID although self-d/c'ed (patient had relief on risperidone), history of marijuana use with last use one year ago, history of aggression toward property [facility] for inability to care for self 2/2 psychotic sxs and imminent risk of harm 2/2 poor judgment from psychotic sxs.    The patient still presents with Sx of cristine but attenuated. Delusions persist. Will titrate Risperdal, initiate Depakote, taper Ativan for sedation.    Plan:  1.	Legal: pt would benefit from VOL 9.13 status  2.	Safety: routine obs appropriate as pt denying active SI/HI; Zyprexa 5mg PO q6h PRN agitation, ativan 2mg q6h PO  PRN agitation, Zyprexa 10mg IM PRN severe agitation  3.	Psychiatric: Risperdal 2mg BID, Depakote DR 500mg BID, Ativan 1.5mg TID; melatonin 5mg; trazodone 100mg PRN for insomnia  PRN's: Neurontin 300mg PO QID PRN anxiety  4.	G/M therapy   5.	Medical: no acute issues; F/u basic AM labs and EKG  6.	Collateral/Dispo: Dispo when stable; Collateral was obtained from Dr. Blanchard in [place]
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
## 2022-02-15 NOTE — BH INPATIENT PSYCHIATRY PROGRESS NOTE - NSBHCHARTREVIEWVS_PSY_A_CORE FT
Vital Signs Last 24 Hrs  T(C): 37.1 (02-15-22 @ 19:48), Max: 37.1 (02-15-22 @ 19:48)  T(F): 98.7 (02-15-22 @ 19:48), Max: 98.7 (02-15-22 @ 19:48)  HR: --  BP: --  BP(mean): --  RR: 18 (02-15-22 @ 08:37) (18 - 18)  SpO2: --    Orthostatic VS  02-15-22 @ 19:48  Lying BP: --/-- HR: --  Sitting BP: 140/92 HR: 106  Standing BP: 139/90 HR: 104  Site: --  Mode: --  Orthostatic VS  02-15-22 @ 08:37  Lying BP: --/-- HR: --  Sitting BP: 158/77 HR: 91  Standing BP: 135/67 HR: 106  Site: --  Mode: --  Orthostatic VS  02-14-22 @ 19:25  Lying BP: --/-- HR: --  Sitting BP: 139/88 HR: 107  Standing BP: 140/79 HR: 94  Site: --  Mode: --  Orthostatic VS  02-14-22 @ 08:48  Lying BP: --/-- HR: --  Sitting BP: 139/69 HR: 86  Standing BP: 137/85 HR: 97  Site: --  Mode: --

## 2022-02-16 PROCEDURE — 99232 SBSQ HOSP IP/OBS MODERATE 35: CPT

## 2022-02-16 RX ORDER — DIVALPROEX SODIUM 500 MG/1
500 TABLET, DELAYED RELEASE ORAL DAILY
Refills: 0 | Status: DISCONTINUED | OUTPATIENT
Start: 2022-02-16 | End: 2022-02-17

## 2022-02-16 RX ORDER — DIVALPROEX SODIUM 500 MG/1
750 TABLET, DELAYED RELEASE ORAL AT BEDTIME
Refills: 0 | Status: DISCONTINUED | OUTPATIENT
Start: 2022-02-16 | End: 2022-02-17

## 2022-02-16 RX ADMIN — RISPERIDONE 2 MILLIGRAM(S): 4 TABLET ORAL at 08:42

## 2022-02-16 RX ADMIN — Medication 1 MILLIGRAM(S): at 12:59

## 2022-02-16 RX ADMIN — DIVALPROEX SODIUM 500 MILLIGRAM(S): 500 TABLET, DELAYED RELEASE ORAL at 08:42

## 2022-02-16 RX ADMIN — Medication 1.5 MILLIGRAM(S): at 08:42

## 2022-02-16 RX ADMIN — RISPERIDONE 2 MILLIGRAM(S): 4 TABLET ORAL at 20:19

## 2022-02-16 RX ADMIN — DIVALPROEX SODIUM 750 MILLIGRAM(S): 500 TABLET, DELAYED RELEASE ORAL at 20:18

## 2022-02-16 RX ADMIN — Medication 1 MILLIGRAM(S): at 20:19

## 2022-02-16 RX ADMIN — Medication 5 MILLIGRAM(S): at 20:19

## 2022-02-16 NOTE — BH INPATIENT PSYCHIATRY PROGRESS NOTE - NSBHASSESSSUMMFT_PSY_ALL_CORE
20 y/o young man, single, unemployed, living with mother and cousin; no known PMH; PPhx of schizoaffective disorder and cannabis use disorder, [facility] and then [facility] in [facility], no past suicide attempts, no past inpatient hospitalizations, previous trials on Abilify 5 mg and risperidone 1 mg BID although self-d/c'ed (patient had relief on risperidone), history of marijuana use with last use one year ago, history of aggression toward property referred from [facility] for inability to care for self 2/2 psychotic sxs and imminent risk of harm 2/2 poor judgment from psychotic sxs.    The patient still presents with Sx of cristine but attenuated. Delusions persist. Will titrate Risperdal, initiate Depakote, taper Ativan for sedation.    Plan:  1.	Legal: pt would benefit from VOL 9.13 status  2.	Safety: routine obs appropriate as pt denying active SI/HI; Zyprexa 5mg PO q6h PRN agitation, ativan 2mg q6h PO  PRN agitation, Zyprexa 10mg IM PRN severe agitation  3.	Psychiatric: Risperdal 2mg BID, Depakote DR 500mg BID, Ativan 1.5mg TID; melatonin 5mg; trazodone 100mg PRN for insomnia  PRN's: Neurontin 300mg PO QID PRN anxiety  4.	G/M therapy   5.	Medical: no acute issues; F/u basic AM labs and EKG  6.	Collateral/Dispo: Dispo when stable; Collateral was obtained from Dr. Blanchard in [place] 22 y/o young man, single, unemployed, living with mother and cousin; no known PMH; PPhx of schizoaffective disorder and cannabis use disorder, [facility] and [facility] in [facility], no past suicide attempts, no past inpatient hospitalizations, previous trials on Abilify 5 mg and risperidone 1 mg BID although self-d/c'ed (patient had relief on risperidone), history of marijuana use with last use one year ago, history of aggression toward property referred from [facility] for inability to care for self 2/2 psychotic sxs and imminent risk of harm 2/2 poor judgment from psychotic sxs.    Mood symptoms continue to improve. Delusions persist, however possibly with less pre-occupation vs. minimizing? Reality testing better than the day prior. Will continue Depakote titration. Tapering Ativan to prevent sedation which the patient is concerned with.    Plan:  1.	Legal: pt would benefit from VOL 9.13 status  2.	Safety: routine obs appropriate as pt denying active SI/HI; Zyprexa 5mg PO q6h PRN agitation, ativan 2mg q6h PO  PRN agitation, Zyprexa 10mg IM PRN severe agitation  3.	Psychiatric: Risperdal 2mg BID, Depakote DR 500mg AM, 750mg HS; Ativan 1mg TID; melatonin 5mg; trazodone 100mg PRN for insomnia    4.	G/M therapy   5.	Medical: no acute issues; F/u basic AM labs and EKG  6.	Collateral/Dispo: Dispo when stable; Collateral was obtained from Dr. Blanchard in [facility]

## 2022-02-16 NOTE — BH INPATIENT PSYCHIATRY PROGRESS NOTE - NSBHCHARTREVIEWVS_PSY_A_CORE FT
Vital Signs Last 24 Hrs  T(C): 37 (02-16-22 @ 14:50), Max: 37.1 (02-15-22 @ 19:48)  T(F): 98.6 (02-16-22 @ 14:50), Max: 98.7 (02-15-22 @ 19:48)  HR: --  BP: --  BP(mean): --  RR: --  SpO2: --    Orthostatic VS  02-16-22 @ 08:22  Lying BP: --/-- HR: --  Sitting BP: 125/78 HR: 87  Standing BP: 148/76 HR: 115  Site: --  Mode: --  Orthostatic VS  02-15-22 @ 19:48  Lying BP: --/-- HR: --  Sitting BP: 140/92 HR: 106  Standing BP: 139/90 HR: 104  Site: --  Mode: --  Orthostatic VS  02-15-22 @ 08:37  Lying BP: --/-- HR: --  Sitting BP: 158/77 HR: 91  Standing BP: 135/67 HR: 106  Site: --  Mode: --  Orthostatic VS  02-14-22 @ 19:25  Lying BP: --/-- HR: --  Sitting BP: 139/88 HR: 107  Standing BP: 140/79 HR: 94  Site: --  Mode: --

## 2022-02-16 NOTE — BH INPATIENT PSYCHIATRY PROGRESS NOTE - NSBHFUPINTERVALHXFT_PSY_A_CORE
Patient is follow up for psychosis.  Patient is discussed in treatment team.  Per nursing report pt compliant with medication and tolerating it well.  He is sleeping, appetite is normal.   The pt. reports he feels "calm" and mellow. He stated "my inner voice is not as loud as it used to be". He also reports "I don't feel as guarded." The pt. stated that he and the "flying monkeys are friends now." The pt. stated that he feels "stupid" when reflecting on some of the statements he made when initially admitted. He continues to believe that something happened to his sheets, but is no longer pre-occupied with or angered by this, and is OK with buying new sheets. The pt. stated that he is on better terms with his mother, however, continues to believe that she is "enabling people." Patient is follow up for psychosis.  Patient is discussed in treatment team.  Per nursing report pt compliant with medication and tolerating it well.  He is sleeping, appetite is normal.   The pt. reports he feels "calm" and mellow. He stated "my inner voice is not as loud as it used to be". He also reports "I don't feel as guarded." The pt. stated that he and the "flying monkeys are friends now." The pt. stated that he feels "stupid" when reflecting on some of the statements he made when initially admitted. He continues to believe that something happened to his sheets, but is no longer pre-occupied with or angered by this, and is OK with buying new sheets. The pt. stated that he is on better terms with his mother, however, continues to believe that she is "enabling people." He did not elaborate on what this meant to him. The pt. is observed attending groups and socializing with patients. Vital signs stable.

## 2022-02-17 PROCEDURE — 99232 SBSQ HOSP IP/OBS MODERATE 35: CPT | Mod: 25

## 2022-02-17 RX ORDER — DIVALPROEX SODIUM 500 MG/1
750 TABLET, DELAYED RELEASE ORAL AT BEDTIME
Refills: 0 | Status: DISCONTINUED | OUTPATIENT
Start: 2022-02-17 | End: 2022-02-24

## 2022-02-17 RX ORDER — DIVALPROEX SODIUM 500 MG/1
750 TABLET, DELAYED RELEASE ORAL
Refills: 0 | Status: DISCONTINUED | OUTPATIENT
Start: 2022-02-17 | End: 2022-02-17

## 2022-02-17 RX ORDER — DIVALPROEX SODIUM 500 MG/1
500 TABLET, DELAYED RELEASE ORAL DAILY
Refills: 0 | Status: DISCONTINUED | OUTPATIENT
Start: 2022-02-17 | End: 2022-02-24

## 2022-02-17 RX ADMIN — RISPERIDONE 2 MILLIGRAM(S): 4 TABLET ORAL at 20:16

## 2022-02-17 RX ADMIN — Medication 0.5 MILLIGRAM(S): at 20:16

## 2022-02-17 RX ADMIN — Medication 5 MILLIGRAM(S): at 20:16

## 2022-02-17 RX ADMIN — DIVALPROEX SODIUM 500 MILLIGRAM(S): 500 TABLET, DELAYED RELEASE ORAL at 09:27

## 2022-02-17 RX ADMIN — Medication 1 MILLIGRAM(S): at 13:26

## 2022-02-17 RX ADMIN — Medication 1 MILLIGRAM(S): at 09:27

## 2022-02-17 RX ADMIN — DIVALPROEX SODIUM 750 MILLIGRAM(S): 500 TABLET, DELAYED RELEASE ORAL at 20:16

## 2022-02-17 RX ADMIN — RISPERIDONE 2 MILLIGRAM(S): 4 TABLET ORAL at 09:27

## 2022-02-17 NOTE — BH INPATIENT PSYCHIATRY PROGRESS NOTE - NSBHASSESSSUMMFT_PSY_ALL_CORE
20 y/o young man, single, unemployed, living with mother and cousin; no known PMH; PPhx of schizoaffective disorder and cannabis use disorder, [facility], no past suicide attempts, no past inpatient hospitalizations, previous trials on Abilify 5 mg and risperidone 1 mg BID although self-d/c'ed (patient had relief on risperidone), history of marijuana use with last use one year ago, history of aggression toward property [facility] for inability to care for self 2/2 psychotic sxs and imminent risk of harm 2/2 poor judgment from psychotic sxs.    Mood symptoms continue to improve. Delusions persist, especially when discussing his ex, mother and previous manager. The pt. is better able to respond to challenging delusions. The pt. reports feeling "high" and sedated. Ativan tapered. Will continue other medications as ordered. VPA level Monday.  The patient's mother stated that she has a SCZ Dx and is maintained on Haldol Dec 75mg. She was concerned that Depakote may be affecting the patient's energy level. She was assured that titration would be held until a level is taken.     Plan:  1.	Legal: pt would benefit from VOL 9.13 status  2.	Safety: routine obs appropriate as pt denying active SI/HI; Zyprexa 5mg PO q6h PRN agitation, ativan 2mg q6h PO  PRN agitation, Zyprexa 10mg IM PRN severe agitation  3.	Psychiatric: Risperdal 2mg BID, Depakote DR 500mg AM, 750mg HS; Ativan 0.5mg TID; melatonin 5mg; trazodone 100mg PRN for insomnia    4.	G/M therapy   5.	Medical: no acute issues; F/u basic AM labs and EKG  6.	Collateral/Dispo: Dispo when stable; Collateral was obtained from Dr. Blanchard in [facility]
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
## 2022-02-17 NOTE — BH INPATIENT PSYCHIATRY PROGRESS NOTE - NSBHCHARTREVIEWVS_PSY_A_CORE FT
Vital Signs Last 24 Hrs  T(C): 36 (02-17-22 @ 14:40), Max: 36.7 (02-16-22 @ 22:36)  T(F): 96.8 (02-17-22 @ 14:40), Max: 98 (02-16-22 @ 22:36)  HR: --  BP: --  BP(mean): --  RR: --  SpO2: --    Orthostatic VS  02-17-22 @ 07:54  Lying BP: --/-- HR: --  Sitting BP: 126/79 HR: 96  Standing BP: 146/67 HR: 104  Site: --  Mode: --  Orthostatic VS  02-16-22 @ 18:52  Lying BP: --/-- HR: --  Sitting BP: 135/79 HR: 105  Standing BP: 130/91 HR: 116  Site: upper left arm  Mode: electronic  Orthostatic VS  02-16-22 @ 08:22  Lying BP: --/-- HR: --  Sitting BP: 125/78 HR: 87  Standing BP: 148/76 HR: 115  Site: --  Mode: --  Orthostatic VS  02-15-22 @ 19:48  Lying BP: --/-- HR: --  Sitting BP: 140/92 HR: 106  Standing BP: 139/90 HR: 104  Site: --  Mode: --

## 2022-02-18 LAB
BASOPHILS # BLD AUTO: 0.04 K/UL — SIGNIFICANT CHANGE UP (ref 0–0.2)
BASOPHILS NFR BLD AUTO: 0.5 % — SIGNIFICANT CHANGE UP (ref 0–2)
EOSINOPHIL # BLD AUTO: 0.18 K/UL — SIGNIFICANT CHANGE UP (ref 0–0.5)
EOSINOPHIL NFR BLD AUTO: 2.3 % — SIGNIFICANT CHANGE UP (ref 0–6)
HCT VFR BLD CALC: 46.2 % — SIGNIFICANT CHANGE UP (ref 39–50)
HGB BLD-MCNC: 14.9 G/DL — SIGNIFICANT CHANGE UP (ref 13–17)
IANC: 3.7 K/UL — SIGNIFICANT CHANGE UP (ref 1.5–8.5)
IMM GRANULOCYTES NFR BLD AUTO: 0.4 % — SIGNIFICANT CHANGE UP (ref 0–1.5)
LYMPHOCYTES # BLD AUTO: 3.09 K/UL — SIGNIFICANT CHANGE UP (ref 1–3.3)
LYMPHOCYTES # BLD AUTO: 40.2 % — SIGNIFICANT CHANGE UP (ref 13–44)
MCHC RBC-ENTMCNC: 23.1 PG — LOW (ref 27–34)
MCHC RBC-ENTMCNC: 32.3 GM/DL — SIGNIFICANT CHANGE UP (ref 32–36)
MCV RBC AUTO: 71.7 FL — LOW (ref 80–100)
MONOCYTES # BLD AUTO: 0.64 K/UL — SIGNIFICANT CHANGE UP (ref 0–0.9)
MONOCYTES NFR BLD AUTO: 8.3 % — SIGNIFICANT CHANGE UP (ref 2–14)
NEUTROPHILS # BLD AUTO: 3.7 K/UL — SIGNIFICANT CHANGE UP (ref 1.8–7.4)
NEUTROPHILS NFR BLD AUTO: 48.3 % — SIGNIFICANT CHANGE UP (ref 43–77)
NRBC # BLD: 0 /100 WBCS — SIGNIFICANT CHANGE UP
NRBC # FLD: 0 K/UL — SIGNIFICANT CHANGE UP
PLATELET # BLD AUTO: 355 K/UL — SIGNIFICANT CHANGE UP (ref 150–400)
RBC # BLD: 6.44 M/UL — HIGH (ref 4.2–5.8)
RBC # FLD: 16.3 % — HIGH (ref 10.3–14.5)
WBC # BLD: 7.68 K/UL — SIGNIFICANT CHANGE UP (ref 3.8–10.5)
WBC # FLD AUTO: 7.68 K/UL — SIGNIFICANT CHANGE UP (ref 3.8–10.5)

## 2022-02-18 PROCEDURE — 99232 SBSQ HOSP IP/OBS MODERATE 35: CPT | Mod: 25

## 2022-02-18 RX ADMIN — RISPERIDONE 2 MILLIGRAM(S): 4 TABLET ORAL at 08:58

## 2022-02-18 RX ADMIN — Medication 0.5 MILLIGRAM(S): at 13:35

## 2022-02-18 RX ADMIN — Medication 5 MILLIGRAM(S): at 20:28

## 2022-02-18 RX ADMIN — Medication 0.5 MILLIGRAM(S): at 08:59

## 2022-02-18 RX ADMIN — DIVALPROEX SODIUM 750 MILLIGRAM(S): 500 TABLET, DELAYED RELEASE ORAL at 20:28

## 2022-02-18 RX ADMIN — DIVALPROEX SODIUM 500 MILLIGRAM(S): 500 TABLET, DELAYED RELEASE ORAL at 08:58

## 2022-02-18 RX ADMIN — Medication 0.5 MILLIGRAM(S): at 20:28

## 2022-02-18 RX ADMIN — RISPERIDONE 2 MILLIGRAM(S): 4 TABLET ORAL at 20:28

## 2022-02-18 NOTE — BH TREATMENT PLAN - NSCMSPTSTRENGTHS_PSY_ALL_CORE
Able to adapt/Expressive of emotions/Future/goal oriented/Physically healthy/Supportive family
Able to adapt/Expressive of emotions/Future/goal oriented/Physically healthy/Supportive family

## 2022-02-18 NOTE — BH TREATMENT PLAN - NSTXMEDICINTERPR_PSY_ALL_CORE
Over the next seven days Psych rehab staff will provide support, encouragement, psycho education and group /  individual psychotherapy towards progression of established goal.
Over the next seven days Psych rehab staff will provide support, encouragement, psycho education and group /  individual psychotherapy towards progression of established goal.
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
## 2022-02-18 NOTE — BH TREATMENT PLAN - NSDCCRITERIA_PSY_ALL_CORE
Behavior not influenced by psychosis;  DELONG;  cgi<=2
Behavior not influenced by psychosis;  DELONG;  cgi<=2

## 2022-02-18 NOTE — BH TREATMENT PLAN - NSTXDCOTHRGOAL_PSY_ALL_CORE
Pt will report a decrease in mood symptoms and comply with discharge planning.
Pt will report a decrease in mood symptoms and comply with discharge planning.

## 2022-02-18 NOTE — BH INPATIENT PSYCHIATRY PROGRESS NOTE - NSBHCHARTREVIEWVS_PSY_A_CORE FT
Vital Signs Last 24 Hrs  T(C): 35.7 (02-18-22 @ 14:40), Max: 37.1 (02-18-22 @ 06:18)  T(F): 96.3 (02-18-22 @ 14:40), Max: 98.7 (02-18-22 @ 06:18)  HR: --  BP: --  BP(mean): --  RR: 14 (02-18-22 @ 13:08) (14 - 14)  SpO2: --    Orthostatic VS  02-18-22 @ 08:33  Lying BP: --/-- HR: --  Sitting BP: 120/75 HR: 98  Standing BP: 130/63 HR: 100  Site: --  Mode: --  Orthostatic VS  02-17-22 @ 07:54  Lying BP: --/-- HR: --  Sitting BP: 126/79 HR: 96  Standing BP: 146/67 HR: 104  Site: --  Mode: --

## 2022-02-18 NOTE — BH TREATMENT PLAN - NSTXCAREGIVERPARTICIPATE_PSY_P_CORE
Family/Caregiver participated in identification of needs/problems/goals for treatment/Family/Caregiver participated in defining interventions/Family/Caregiver participated in development of after care plan
No, patient unwilling to involve family/caregiver
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
## 2022-02-19 LAB — SARS-COV-2 RNA SPEC QL NAA+PROBE: SIGNIFICANT CHANGE UP

## 2022-02-19 RX ADMIN — Medication 0.5 MILLIGRAM(S): at 13:55

## 2022-02-19 RX ADMIN — Medication 2 MILLIGRAM(S): at 23:17

## 2022-02-19 RX ADMIN — Medication 5 MILLIGRAM(S): at 20:08

## 2022-02-19 RX ADMIN — Medication 0.5 MILLIGRAM(S): at 20:08

## 2022-02-19 RX ADMIN — RISPERIDONE 2 MILLIGRAM(S): 4 TABLET ORAL at 08:52

## 2022-02-19 RX ADMIN — Medication 0.5 MILLIGRAM(S): at 08:53

## 2022-02-19 RX ADMIN — DIVALPROEX SODIUM 750 MILLIGRAM(S): 500 TABLET, DELAYED RELEASE ORAL at 20:08

## 2022-02-19 RX ADMIN — DIVALPROEX SODIUM 500 MILLIGRAM(S): 500 TABLET, DELAYED RELEASE ORAL at 08:52

## 2022-02-19 RX ADMIN — RISPERIDONE 2 MILLIGRAM(S): 4 TABLET ORAL at 20:08

## 2022-02-19 RX ADMIN — Medication 100 MILLIGRAM(S): at 23:16

## 2022-02-20 RX ADMIN — Medication 2 MILLIGRAM(S): at 23:05

## 2022-02-20 RX ADMIN — Medication 100 MILLIGRAM(S): at 23:05

## 2022-02-20 RX ADMIN — Medication 0.5 MILLIGRAM(S): at 21:15

## 2022-02-20 RX ADMIN — Medication 0.5 MILLIGRAM(S): at 14:05

## 2022-02-20 RX ADMIN — Medication 0.5 MILLIGRAM(S): at 09:37

## 2022-02-20 RX ADMIN — DIVALPROEX SODIUM 500 MILLIGRAM(S): 500 TABLET, DELAYED RELEASE ORAL at 09:36

## 2022-02-20 RX ADMIN — Medication 5 MILLIGRAM(S): at 21:15

## 2022-02-20 RX ADMIN — RISPERIDONE 2 MILLIGRAM(S): 4 TABLET ORAL at 09:36

## 2022-02-20 RX ADMIN — RISPERIDONE 2 MILLIGRAM(S): 4 TABLET ORAL at 21:14

## 2022-02-20 RX ADMIN — DIVALPROEX SODIUM 750 MILLIGRAM(S): 500 TABLET, DELAYED RELEASE ORAL at 21:14

## 2022-02-21 LAB — VALPROATE SERPL-MCNC: 81.5 UG/ML — SIGNIFICANT CHANGE UP (ref 50–100)

## 2022-02-21 RX ADMIN — RISPERIDONE 2 MILLIGRAM(S): 4 TABLET ORAL at 10:19

## 2022-02-21 RX ADMIN — DIVALPROEX SODIUM 500 MILLIGRAM(S): 500 TABLET, DELAYED RELEASE ORAL at 10:18

## 2022-02-21 RX ADMIN — DIVALPROEX SODIUM 750 MILLIGRAM(S): 500 TABLET, DELAYED RELEASE ORAL at 20:06

## 2022-02-21 RX ADMIN — Medication 0.5 MILLIGRAM(S): at 13:34

## 2022-02-21 RX ADMIN — Medication 5 MILLIGRAM(S): at 20:06

## 2022-02-21 RX ADMIN — Medication 0.5 MILLIGRAM(S): at 20:05

## 2022-02-21 RX ADMIN — Medication 0.5 MILLIGRAM(S): at 10:19

## 2022-02-21 RX ADMIN — RISPERIDONE 2 MILLIGRAM(S): 4 TABLET ORAL at 20:05

## 2022-02-22 PROCEDURE — 99232 SBSQ HOSP IP/OBS MODERATE 35: CPT

## 2022-02-22 RX ADMIN — DIVALPROEX SODIUM 500 MILLIGRAM(S): 500 TABLET, DELAYED RELEASE ORAL at 08:46

## 2022-02-22 RX ADMIN — RISPERIDONE 2 MILLIGRAM(S): 4 TABLET ORAL at 08:46

## 2022-02-22 RX ADMIN — Medication 0.5 MILLIGRAM(S): at 13:26

## 2022-02-22 RX ADMIN — DIVALPROEX SODIUM 750 MILLIGRAM(S): 500 TABLET, DELAYED RELEASE ORAL at 20:22

## 2022-02-22 RX ADMIN — Medication 0.5 MILLIGRAM(S): at 08:46

## 2022-02-22 RX ADMIN — RISPERIDONE 2 MILLIGRAM(S): 4 TABLET ORAL at 20:22

## 2022-02-22 RX ADMIN — Medication 0.25 MILLIGRAM(S): at 20:22

## 2022-02-22 RX ADMIN — Medication 5 MILLIGRAM(S): at 20:23

## 2022-02-22 NOTE — BH DISCHARGE NOTE NURSING/SOCIAL WORK/PSYCH REHAB - DISCHARGE INSTRUCTIONS AFTERCARE APPOINTMENTS
In order to check the location, date, or time of your aftercare appointment, please refer to your Discharge Instructions Document given to you upon leaving the hospital.  If you have lost the instructions please call [phone]

## 2022-02-22 NOTE — BH DISCHARGE NOTE NURSING/SOCIAL WORK/PSYCH REHAB - NSBHADVANCE_PSY_ALL_CORE
pt had fibroids removed April 22 and also recently stopped birth control. Today was walking and began to feel bilateral arm numbness, shortness of breath, chest pressure  no other medical hx or family hx  First time working out today No

## 2022-02-22 NOTE — BH DISCHARGE NOTE NURSING/SOCIAL WORK/PSYCH REHAB - NSBHDCADDR1FT_A_CORE
[address] [place] [address], [facility] [address] Floor [address]  [place] [address]

## 2022-02-22 NOTE — BH DISCHARGE NOTE NURSING/SOCIAL WORK/PSYCH REHAB - NSDCPRRECOMMEND_PSY_ALL_CORE
Psych rehab staff recommend patient follow up with [facility]/Dr. Blanchard on 02/28/2022

## 2022-02-22 NOTE — BH INPATIENT PSYCHIATRY PROGRESS NOTE - NSBHCHARTREVIEWLAB_PSY_A_CORE FT
Patient is scheduled for repeat  CMP and TSH, results pending

## 2022-02-22 NOTE — BH INPATIENT PSYCHIATRY PROGRESS NOTE - NSBHASSESSSUMMFT_PSY_ALL_CORE
22 y/o young man, single, unemployed, living with mother and cousin; no known PMH; PPhx of schizoaffective disorder and cannabis use disorder, [facility] and [facility], no past suicide attempts, no past inpatient hospitalizations, previous trials on Abilify 5 mg and risperidone 1 mg BID although self-d/c'ed (patient had relief on risperidone), history of marijuana use with last use one year ago, history of aggression toward property [facility] for inability to care for self 2/2 psychotic sxs and imminent risk of harm 2/2 poor judgment from psychotic sxs.    The patient is overall brighter, less anxious, is engaged. He is less pre-occupied with theories regarding his ex-girlfriend and how she influences his Sx. He did not reference flying monkeys until asked; also denied feeling threatened by them now. He is refusing DELONG. Potential discharge on Thursday. VPA level on 2/21 is 81.5. Will continue to taper Ativan.    Plan:  1.	Legal: pt would benefit from VOL 9.13 status  2.	Safety: routine obs appropriate as pt denying active SI/HI; Zyprexa 5mg PO q6h PRN agitation, ativan 2mg q6h PO  PRN agitation, Zyprexa 10mg IM PRN severe agitation  3.	Psychiatric: Risperdal 2mg BID, Depakote DR 500mg AM, 750mg HS; Ativan 0.25mg TID; melatonin 5mg; trazodone 100mg PRN for insomnia    4.	G/M therapy   5.	Medical: no acute issues; F/u basic AM labs and EKG  6.	Collateral/Dispo: Dispo when stable; Collateral was obtained from Dr. Blanchard in [facility]

## 2022-02-22 NOTE — BH INPATIENT PSYCHIATRY PROGRESS NOTE - NSBHFUPINTERVALHXFT_PSY_A_CORE
Patient is follow up for SAD.  Patient is discussed in treatment team.  Per nursing report pt compliant with medication. No events reported overnight. The pt. is sleeping, no changes to appetite. The pt. stated that his mood is "mellow." He reports what would have triggered him before, no longer does. He reports sedation has lessened with tapering of Ativan. He stated he still has energy to play basketball, is writing rap songs. The pt. stated he is not as pre-occupied with his ex-girlfriend because he is more concerned with returning home and helping his mother with their housing issue. He also stated the "flying monkeys" are no longer an issue; that they are "flying around themselves." The pt. denied AH/VH, S/i/p, H/i/p. The pt. stated he would like to move to [place] to be closer to his family and is willing to put issues behind him. He is discharge focused and is agreeable to discharge Thursday or Friday. Medication adherent. No SE noted. Vital signs stable. The writer discussed DELONG with the pt. and he refused.

## 2022-02-22 NOTE — BH DISCHARGE NOTE NURSING/SOCIAL WORK/PSYCH REHAB - NSCDUDCCRISIS_PSY_A_CORE
[facility] Well  1 [phone] [facility]-WELL ([phone])  Text "WELL" to [phone]  Website: www.avandeo/.[employer] [facility] 1 [phone] 201-YVKQ (5534) Website: www.safehorizon.org/.National Suicide Prevention Lifeline 9 (897) 424-2907/.  [facility]  1 (477) LIFENET (797-1404)/.  [facility]  [address] [place] [address] [phone]   Hours:  Monday through Friday from 9 AM to 3 PM/.  [employer] - [facility] Crisis Line  [phone], Option 1

## 2022-02-22 NOTE — BH DISCHARGE NOTE NURSING/SOCIAL WORK/PSYCH REHAB - NSDCPRGOAL_PSY_ALL_CORE
Writer met with patient in order to discuss progress towards psych rehab goal upon discharge. Pt has made fair progress, as patient has maintained medication compliance verbalized intent to remain compliant post discharge.  Writer provided support.    On the unit, patient was visible, interacting with selected peers. Patient has been observed to be calm and cooperative on unit. During session, patient states that he is hopeful In regards to discharge and is looking forward to spending time creating music and engaging in coping skills. Writer provided support. patient and writer completed safety plan.  Patient denies SI     patient attended approximately 70% of psych rehab  groups. Pt was verbally engaged, however unable to tolerate session structure. Pt completed press ganey survey.

## 2022-02-22 NOTE — BH DISCHARGE NOTE NURSING/SOCIAL WORK/PSYCH REHAB - PATIENT PORTAL LINK FT
You can access the FollowMyHealth Patient Portal offered by [facility] by registering at the following website: [email or web address]. By joining [email or web address]’s FollowMyHealth portal, you will also be able to view your health information using other applications (apps) compatible with our system.

## 2022-02-22 NOTE — BH INPATIENT PSYCHIATRY PROGRESS NOTE - NSBHCHARTREVIEWVS_PSY_A_CORE FT
Vital Signs Last 24 Hrs  T(C): 36.8 (02-22-22 @ 06:24), Max: 36.9 (02-21-22 @ 21:49)  T(F): 98.3 (02-22-22 @ 06:24), Max: 98.5 (02-21-22 @ 21:49)  HR: --  BP: --  BP(mean): --  RR: --  SpO2: --    Orthostatic VS  02-22-22 @ 08:36  Lying BP: --/-- HR: --  Sitting BP: 145/75 HR: 95  Standing BP: 153/69 HR: 111  Site: --  Mode: --  Orthostatic VS  02-21-22 @ 19:29  Lying BP: --/-- HR: --  Sitting BP: 129/85 HR: 113  Standing BP: 132/68 HR: 110  Site: --  Mode: --  Orthostatic VS  02-21-22 @ 13:06  Lying BP: --/-- HR: --  Sitting BP: 138/63 HR: 112  Standing BP: --/-- HR: --  Site: --  Mode: --  Orthostatic VS  02-21-22 @ 08:15  Lying BP: --/-- HR: --  Sitting BP: 135/78 HR: 102  Standing BP: 141/85 HR: 100  Site: upper left arm  Mode: electronic  Orthostatic VS  02-20-22 @ 19:41  Lying BP: --/-- HR: --  Sitting BP: 146/76 HR: 104  Standing BP: 130/65 HR: 116  Site: --  Mode: --

## 2022-02-23 PROCEDURE — 99232 SBSQ HOSP IP/OBS MODERATE 35: CPT

## 2022-02-23 RX ADMIN — Medication 0.25 MILLIGRAM(S): at 08:27

## 2022-02-23 RX ADMIN — Medication 5 MILLIGRAM(S): at 20:33

## 2022-02-23 RX ADMIN — RISPERIDONE 2 MILLIGRAM(S): 4 TABLET ORAL at 08:27

## 2022-02-23 RX ADMIN — DIVALPROEX SODIUM 750 MILLIGRAM(S): 500 TABLET, DELAYED RELEASE ORAL at 20:33

## 2022-02-23 RX ADMIN — DIVALPROEX SODIUM 500 MILLIGRAM(S): 500 TABLET, DELAYED RELEASE ORAL at 08:27

## 2022-02-23 RX ADMIN — Medication 0.25 MILLIGRAM(S): at 20:33

## 2022-02-23 RX ADMIN — Medication 0.25 MILLIGRAM(S): at 13:11

## 2022-02-23 RX ADMIN — RISPERIDONE 2 MILLIGRAM(S): 4 TABLET ORAL at 20:33

## 2022-02-23 NOTE — BH INPATIENT PSYCHIATRY PROGRESS NOTE - NSBHFUPINTERVALHXFT_PSY_A_CORE
Patient is follow up for SAD.  Patient is discussed in treatment team.  Per nursing report pt compliant with medication. No events reported overnight. The pt. is sleeping, no changes to appetite. The pt. describes his mood as "good." He states he is looking forward to discharge. The pt. reports that he is still thinking about getting back together with his ex-girlfriend. He did not endorse delusional thought process in that context. The pt. denied AH/VH, S/i/p, H/i/p. He denies SE, vital signs stable.

## 2022-02-23 NOTE — BH INPATIENT PSYCHIATRY PROGRESS NOTE - NSBHCHARTREVIEWVS_PSY_A_CORE FT
Vital Signs Last 24 Hrs  T(C): 36.7 (02-23-22 @ 06:33), Max: 36.8 (02-22-22 @ 19:36)  T(F): 98 (02-23-22 @ 06:33), Max: 98.2 (02-22-22 @ 19:36)  HR: --  BP: --  BP(mean): --  RR: --  SpO2: --    Orthostatic VS  02-23-22 @ 08:38  Lying BP: --/-- HR: --  Sitting BP: 142/71 HR: 86  Standing BP: 143/81 HR: 94  Site: --  Mode: --  Orthostatic VS  02-22-22 @ 19:36  Lying BP: --/-- HR: --  Sitting BP: 144/84 HR: 95  Standing BP: 128/66 HR: 112  Site: --  Mode: --  Orthostatic VS  02-22-22 @ 08:36  Lying BP: --/-- HR: --  Sitting BP: 145/75 HR: 95  Standing BP: 153/69 HR: 111  Site: --  Mode: --  Orthostatic VS  02-21-22 @ 19:29  Lying BP: --/-- HR: --  Sitting BP: 129/85 HR: 113  Standing BP: 132/68 HR: 110  Site: --  Mode: --

## 2022-02-24 VITALS — RESPIRATION RATE: 16 BRPM

## 2022-02-24 PROCEDURE — 99239 HOSP IP/OBS DSCHRG MGMT >30: CPT

## 2022-02-24 RX ORDER — DIVALPROEX SODIUM 500 MG/1
1 TABLET, DELAYED RELEASE ORAL
Qty: 28 | Refills: 0
Start: 2022-02-24 | End: 2022-03-09

## 2022-02-24 RX ORDER — RISPERIDONE 4 MG/1
1 TABLET ORAL
Qty: 28 | Refills: 0
Start: 2022-02-24 | End: 2022-03-09

## 2022-02-24 RX ORDER — DIVALPROEX SODIUM 500 MG/1
1 TABLET, DELAYED RELEASE ORAL
Qty: 14 | Refills: 0
Start: 2022-02-24 | End: 2022-03-09

## 2022-02-24 RX ADMIN — DIVALPROEX SODIUM 500 MILLIGRAM(S): 500 TABLET, DELAYED RELEASE ORAL at 09:15

## 2022-02-24 RX ADMIN — Medication 0.25 MILLIGRAM(S): at 09:14

## 2022-02-24 RX ADMIN — RISPERIDONE 2 MILLIGRAM(S): 4 TABLET ORAL at 09:14

## 2022-02-24 NOTE — BH INPATIENT PSYCHIATRY PROGRESS NOTE - NSTXDCOTHRPROGRES_PSY_ALL_CORE
No Change
Improving
No Change

## 2022-02-24 NOTE — BH PSYCHOLOGY - GROUP THERAPY NOTE - NSBHPSYCHOLASSESSPROV_PSY_A_CORE
Licensed Staff Psychologist and Trainee

## 2022-02-24 NOTE — BH PSYCHOLOGY - CLINICIAN PSYCHOTHERAPY NOTE - NSBHPSYCHOLNARRATIVE_PSY_A_CORE FT
Writer and patient wore masks during the session due to COVID precautions. Patient was engaged in session and appeared to have adequate hygiene and grooming. Patient denied any current suicidal ideation, intent, or plan and did not endorse any homicidal ideation. Patient's speech rate, speed, and tone was normal. Patient's thought process was clear and goal directed. Patient denied any auditory and visual hallucinations. Patient was oriented to person, place, and time. Patient was able to reflect adequately on his feelings and displayed good decision-making skills. Parameters of treatment and limitations of confidentiality were discussed.    Writer met with patient to discuss target objectives of Acceptance Commitment Therapy group goals and writer utilized a worksheet to identify patient's values. Patient was able to share his values and how he is living a values-based life. Patient discussed interpersonal stressors and the importance of his involvement with his spiritual community.  Patient identified that he will consider examining his behaviors through his values/needs upon discharge.

## 2022-02-24 NOTE — BH INPATIENT PSYCHIATRY PROGRESS NOTE - NSDCCRITERIA_PSY_ALL_CORE
Behavior not influenced by psychosis;  DELONG;  cgi<=2

## 2022-02-24 NOTE — BH INPATIENT PSYCHIATRY PROGRESS NOTE - NSTXDISORGGOAL_PSY_ALL_CORE
Will make at least 3 goal and reality oriented statements during therapy

## 2022-02-24 NOTE — BH INPATIENT PSYCHIATRY PROGRESS NOTE - NSBHASSESSSUMMFT_PSY_ALL_CORE
22 y/o young man, single, unemployed, living with mother and cousin; no known PMH; PPhx of schizoaffective disorder and cannabis use disorder, [facility], no past suicide attempts, no past inpatient hospitalizations, previous trials on Abilify 5 mg and risperidone 1 mg BID although self-d/c'ed (patient had relief on risperidone), history of marijuana use with last use one year ago, history of aggression toward property [facility] for inability to care for self 2/2 psychotic sxs and imminent risk of harm 2/2 poor judgment from psychotic sxs.    Patient's Sx are much improved. Mood is stable, no longer pre-occupied with delusions. Denies S/i/p, H/i/p. He is no longer an acute risk of harm to himself or others and is appropriate for discharge.    Plan:  Psychiatric: Risperdal 2mg BID, Depakote DR 500mg AM, 750mg HS  Follow-up with Dr. Blanchard at [facility]

## 2022-02-24 NOTE — BH PSYCHOLOGY - GROUP THERAPY NOTE - NSPSYCHOLGRPCOGINT_PSY_A_CORE FT
Cognitive/behavioral therapy, Acceptance and Commitment Therapy, Emotional regulation/coping skills taught, psychoeducation  

## 2022-02-24 NOTE — BH INPATIENT PSYCHIATRY PROGRESS NOTE - PRN MEDS
MEDICATIONS  (PRN):  LORazepam     Tablet 2 milliGRAM(s) Oral every 4 hours PRN severe anxiety or agitation  OLANZapine 5 milliGRAM(s) Oral every 4 hours PRN severe agitation or aggression  OLANZapine Injectable 7.5 milliGRAM(s) IntraMuscular once PRN severe agitation or aggression  traZODone 100 milliGRAM(s) Oral at bedtime PRN insomnia  
MEDICATIONS  (PRN):  gabapentin 300 milliGRAM(s) Oral four times a day PRN anxiety  LORazepam     Tablet 2 milliGRAM(s) Oral every 4 hours PRN severe anxiety or agitation  OLANZapine 5 milliGRAM(s) Oral every 4 hours PRN severe agitation or aggression  OLANZapine Injectable 7.5 milliGRAM(s) IntraMuscular once PRN severe agitation or aggression  traZODone 100 milliGRAM(s) Oral at bedtime PRN insomnia  
MEDICATIONS  (PRN):  gabapentin 300 milliGRAM(s) Oral four times a day PRN anxiety  LORazepam     Tablet 2 milliGRAM(s) Oral every 4 hours PRN severe anxiety or agitation  OLANZapine 5 milliGRAM(s) Oral every 4 hours PRN severe agitation or aggression  OLANZapine Injectable 7.5 milliGRAM(s) IntraMuscular once PRN severe agitation or aggression  traZODone 100 milliGRAM(s) Oral at bedtime PRN insomnia  
MEDICATIONS  (PRN):  gabapentin 300 milliGRAM(s) Oral four times a day PRN anxiety  LORazepam     Tablet 2 milliGRAM(s) Oral every 4 hours PRN severe anxiety or agitation  OLANZapine 5 milliGRAM(s) Oral every 4 hours PRN severe agitation or aggression  OLANZapine Injectable 10 milliGRAM(s) IntraMuscular once PRN severe agitation or aggression  
MEDICATIONS  (PRN):  gabapentin 300 milliGRAM(s) Oral four times a day PRN anxiety  LORazepam     Tablet 2 milliGRAM(s) Oral every 4 hours PRN severe anxiety or agitation  OLANZapine 5 milliGRAM(s) Oral every 4 hours PRN severe agitation or aggression  OLANZapine Injectable 7.5 milliGRAM(s) IntraMuscular once PRN severe agitation or aggression  traZODone 100 milliGRAM(s) Oral at bedtime PRN insomnia  
MEDICATIONS  (PRN):  gabapentin 300 milliGRAM(s) Oral four times a day PRN anxiety  LORazepam     Tablet 2 milliGRAM(s) Oral every 4 hours PRN severe anxiety or agitation  OLANZapine 5 milliGRAM(s) Oral every 4 hours PRN severe agitation or aggression  OLANZapine Injectable 7.5 milliGRAM(s) IntraMuscular once PRN severe agitation or aggression  traZODone 100 milliGRAM(s) Oral at bedtime PRN insomnia  
MEDICATIONS  (PRN):  LORazepam     Tablet 2 milliGRAM(s) Oral every 4 hours PRN severe anxiety or agitation  OLANZapine 5 milliGRAM(s) Oral every 4 hours PRN severe agitation or aggression  OLANZapine Injectable 7.5 milliGRAM(s) IntraMuscular once PRN severe agitation or aggression  traZODone 100 milliGRAM(s) Oral at bedtime PRN insomnia  
MEDICATIONS  (PRN):  gabapentin 300 milliGRAM(s) Oral four times a day PRN anxiety  LORazepam     Tablet 2 milliGRAM(s) Oral every 4 hours PRN severe anxiety or agitation  OLANZapine 5 milliGRAM(s) Oral every 4 hours PRN severe agitation or aggression  OLANZapine Injectable 7.5 milliGRAM(s) IntraMuscular once PRN severe agitation or aggression  traZODone 100 milliGRAM(s) Oral at bedtime PRN insomnia  
MEDICATIONS  (PRN):  LORazepam     Tablet 2 milliGRAM(s) Oral every 4 hours PRN severe anxiety or agitation  OLANZapine 5 milliGRAM(s) Oral every 4 hours PRN severe agitation or aggression  OLANZapine Injectable 7.5 milliGRAM(s) IntraMuscular once PRN severe agitation or aggression  traZODone 100 milliGRAM(s) Oral at bedtime PRN insomnia  

## 2022-02-24 NOTE — BH INPATIENT PSYCHIATRY PROGRESS NOTE - NSBHMSETHTCONTENT_PSY_A_CORE
Preoccupations
Delusions
Preoccupations
Delusions/Ideas of reference/Preoccupations
Delusions
Delusions/Ideas of reference
Delusions
Preoccupations
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
## 2022-02-24 NOTE — BH INPATIENT PSYCHIATRY PROGRESS NOTE - NSTXDCOTHRDATEEST_PSY_ALL_CORE
11-Feb-2022

## 2022-02-24 NOTE — BH PSYCHOLOGY - GROUP THERAPY NOTE - NSPSYCHOLGRPCOGPT_PSY_A_CORE FT
Patient attended Cognitive Behavioral Therapy group. The group utilized concepts from Acceptance Commitment Therapy. The group started with a brief check-in and mindfulness/relaxation exercise/discussion focusing on mood congruence activity. This activity generated insightful participation and patients responded well to the Acceptance Commitment Therapy prompt. Patient discussed the concepts of mindfulness and shared examples of healthy ways to engage in healthy mindfulness exercises. Next, the group discussed what it means to be mindful and how can one practice mindfulness on the unit and in their lives. Lastly, the group discussed ways to cope with distressing emotions. Group leaders explained concepts, reinforced participation, and engaged patients in the discussion.
Patient attended Cognitive Behavioral Therapy group. The group utilized concepts from Acceptance Commitment Therapy. The group started with a brief check-in and mindfulness/relaxation exercise/discussion. This activity generated insightful participation and patients responded well to the Acceptance Commitment Therapy prompt. Patient discussed the concepts of distress tolerance and shared examples of healthy ways to cope with distress and ways to engage in healthy coping skills. Next, the group discussed what it means to respond to a feeling rather than act on a feeling, and how can one practice this on the unit and in their lives. Lastly, the group discussed ways to practice distress tolerance. Group leaders explained concepts, reinforced participation, and engaged patients in the discussion.
Patient attended Cognitive Behavioral Therapy group. The group utilized concepts from Acceptance Commitment Therapy. The group started with a brief check-in and mindfulness/relaxation exercise/discussion. This activity generated insightful participation and patients responded well to the Acceptance Commitment Therapy prompt regarding identification of emotions through the use of colors. Patient discussed the concepts of observing one's thoughts and shared examples of healthy ways to cope with distressing thoughts and coexist with these thoughts rather than avoid them. Next, the group discussed what it means to be mindful and how can one practice mindfulness on the unit and in their lives. Lastly, the group practiced a mindfulness activity and members shared their perception of the useful of practicing mindfulness in their lives. Group leaders explained concepts, reinforced participation, and engaged patients in the discussion.  
Patient attended Cognitive Behavioral Therapy group. The group utilized concepts from Acceptance Commitment Therapy. The group started with a brief check-in through identification of favorite hobbies and continued with a mindfulness discussion. This activity generated insightful participation and patients responded well to the Acceptance Commitment Therapy prompt. Patient discussed the concepts of prioritizing mental health needs and shared examples of healthy ways to cope with distress and ways to engage in distress tolerance with anxiety. Next, the group discussed what it means to be self-compassionate and how can one practice kindness towards themselves. Lastly, the group discussed ways to practice self-care through increasing their autonomy in their actions. Group leaders explained concepts, reinforced participation, and engaged patients in the discussion.  
Patient attended Cognitive Behavioral Therapy group. The group utilized concepts from Acceptance Commitment Therapy. The group started with a brief check-in and mindfulness/relaxation exercise/discussion. Patients shared their individual goals for the day that they could accomplish. This activity generated insightful participation and patients responded well to the Acceptance Commitment Therapy prompt. Patient discussed the concepts of mindfulness and shared examples of healthy ways to practice mindfulness. Next, the group discussed what it means to live life by their values and identified short-term and long-term goals. Lastly, the group discussed ways to identify daily goals to increase confidence. Group leaders explained concepts, reinforced participation, and engaged patients in the discussion.
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
## 2022-02-24 NOTE — BH INPATIENT PSYCHIATRY DISCHARGE NOTE - HPI (INCLUDE ILLNESS QUALITY, SEVERITY, DURATION, TIMING, CONTEXT, MODIFYING FACTORS, ASSOCIATED SIGNS AND SYMPTOMS)
22 y/o young man, single, unemployed, living with mother and cousin; no known PMH; PPhx of schizoaffective disorder and canabis use disorder, [facility] then [facility], no past suicide attempts, no past inpatient hospitalizations, previous trials on Abilify 5 mg which patient self-d/c'ed and risperidone 1 mg BID with some relief in sxs until patient self-d/c'ed, history of marijuana use with last use one year ago, history of aggression toward property referred [facility] for inability to care for self 2/2 psychotic sxs and imminent risk of harm 2/2 poor judgment from psychotic sxs.    Patient seen and examined. Initially, patient is guarded and vague regarding events that bought [facility] to [facility]. Over course of interview, patient is hyperverbal, tangential, and expresses paranoia. Patient states he is in the hospital because people are "pressing his buttons". Patient frequently hears his neighbor talking about him, with most recent episode last night in which the neighbor told women on the street that he is a "woman beater". Patient wanted to "try something new" to respond to his neighbor. Patient is vague about what he did but says he was screaming to himself, which frightened his mother and prompted [facility] visit. He currently endorses decreased need for sleep. He believes he can invoke "public fear" by making random statements in public that manipulate other's behavior. Patient gives example that he yelled in a store about demanding respect and found that others acted differently around him thereafter. Patient feels others can read his thoughts. He has had multiple experiences in which he feels others are talking about him, including at home, at work, and with friends. He endorses periods of depressed mood in the past with associated SI although no intent or plan. He denies any current depressed mood, SI, HI, or AVH. He reports feeling safe on the unit currently. Patient says he last felt at baseline without paranoia last year. Patient does not feel that medications can treat his feelings because he believes his experiences are "300% real".         Per [facility] note from 2/10/22:  "Patient and mother arrived for unscheduled walk-in appt.    Patient reports series of delusions about his neighbor doing things to "push my buttons," perhaps spying on him and trying to manipulate and emotionally hurt him. He says he can hear conversations that neighbor is having and things neighbor is talking about even when they are not in physical proximity - denies that what he hears is derogatory or command in nature. States that this makes him feel "uneasy." Patient says because of this he sleeps in the living room. States he does not really know what he does all day but he does not watch TV because he broke the TV. Patient then makes additional bizarre statements such as "wanting to Batman, but feeling like the Joker," "I'm not messing with girls but I broke this girl's heart." States that he has to be extra careful about what he eats. Says that he is sleeping erratically and showering every other day. Pt states he has not been able to go to work because he is "not ready to face the world." He has been spending time wandering to different [facility] because "I need to explore and see everything."  Denies VH. Denies SI/HI. Pt states he has not smoked THC in a couple months. Says he feels "on edge" all the time. No alcohol use.     Collateral from mother, Mari Tamayo:  States that pt's condition has been worsening and she no longer feels safe at home with him. States that patient keeps talking all day long, saying things that don't make sense and going into great detail about them. He has broken the TV and broken the doors of some bedrooms and the bathroom in their home. Mother thinks pt does not trust her because he stole some of her clothing and then said to her that he was stealing it as a way to get his bed sheets back because he thinks his mother stole a specific set of bed sheets. She is concerned about pt wandering all throughout [facility]. Pt made statement about wanting to slash neighbor's tires.    Pt's mother advocating for admission. Pt agreeable to voluntary admission.    A/P:   This is a 22 y/o young man, single, unemployed, living with mother and cousin, diagnosed with schizoaffective disorder, [facility] for paranoid delusions, referential thinking, thought disorder, decline in functioning, and intermittent SI with low intent. Pt with PPHx of cannabis use disorder, no past suicide attempts, no past inpatient hospitalizations, no PMHx. Pt initially on Abilify 5 mg, which he stopped taking, and then was on risperidone 1 mg BID (due to work schedule) with some relief in sxs. Pt stopped taking medication in summer 2021 as he felt it was making him worse and since then has been minimally engaged in tx. Pt presents for unscheduled walk-in visit today with mother with exacerbating paranoid delusions, possible auditory hallucinations, and continued withdrawal and decline in functioning. Pt unable to care for self 2/2 psychotic sxs and may be at imminent risk of harm 2/2 poor judgment from psychotic sxs. He is agreeable to voluntary hospitalization and will benefit from admission for safety, stabilization, and medication management."      Outpatient admission note from February 2021:   "Sheyla is a 21 y/o young man, single, unemployed, living with mother and cousin, with no PPHx and no PMHx who presents to [facility] for [facility] after being [facility] for sxs of paranoia.    On evaluation, Sheyla states that he has had a "mental breakdown" as he felt like people were after him and thought people were trying to hurt him. Patient states he does not know how long this was happening but once he stopped smoking THC and drinking alcohol in September, everything that was going around him became much clearer and more intense. He says that he felt "egotistical" and that people around him were talking about him, saying things to "trigger" him even though some of them were supposed to be his friends. The pt states that initially he thought specific people are trying to hurt him but now thinks it's people in general. He reports not feeling safe at home, uncertain if his mother or cousin might hurt him. He also states that his mind is like "Wikipedia" and he is able to connect unrelated things. Sheyla talks about how with technology people's lives are on display and he feels like his is on display like the Jass Show. He states that with technology thoughts can be inserted into your mind more easily. He is uncertain about whether mind reading and mind control can happen. Pt says he has a special relationship with God although he does not think he is the "chosen one." He also denies any special hours.    Due to these sxs, Sheyla developed some sadness and anxiety. He has been more isolative. The patient's sleep is erratic; sometimes sleeping at 5 AM or 7 AM and then sleeping until 2 PM. When he does not get adequate sleep, he feels tired. He also has been eating less as he states that when he eats he feels nauseous; thinks it's possible that the food could be poisoned. Patient also states that he might have liver cancer based on some sxs he has been looking up. He spends his time at home, watching TV. Sheyla lost his job in September; states he was fired and it was related to some of his paranoia.    Sheyla endorses some urges to be aggressive and protect himself in the context of his thoughts but denies any intention to hurt anyone or actually being aggressive. He has suicidal thoughts as well. Patient has both passive and active SI, but states his intent is low because he is "not strong enough" to end his life.  In September, he cut himself with a blade but does not recall why he did it - states it was not suicidal, but that he knows if he wanted to end his life it would not be difficult, he could have cut a major artery.    He denies AH/VH.    Sheyla denies any current substance use.    [facility] where he was placed on Abilify 5 mg daily which he took for a couple weeks but he states it made him feel less calm. He [facility] tot [facility] about a week ago where he was started on risperidone. He has been taking 2 mg qHS for the past five days. He says the only difference that he has noticed is that he feels "slower" and like his brain does not move or work as quickly. Patient states he is not as preoccupied that people will hurt him but does feel that he needs to "keep my guard up."    PPHX:   no past suicide attempts  no past inpatient hospitalizations  saw therapist at age 14, mandated because of how he "touched a girl"    PMHX: none     FAMILY HX: Mother with schizophrenia, currently on Haldol dec 75 mg, stable for the past 9 years; pt's mother was previously risperidone and Abilify both of which initially worked but then stopped    SOCIAL HX: Patient currently lives with mother and cousin in [place]. He graduated high school. Patient moved around growing up, states he lived in [place], at age 12 moved [place] to live with uncle due to mother having psychotic episodes, moved back to [place] mother at age 15. States he had friends growing up. States he got C's in high school. Was going to attend community college but decided not to because he wanted to pursue music. Used to work at [employer].    SUBSTANCE HX: pt states he used to smoke a lot of THC daily 2-3 blunts daily from age 14 to to age 18 and then 2-3 blunts 3 days/week until September 2020    pt says he used to get drunk with friends on weekends until September 2020    no cocaine, no heroin, no prescription pills"

## 2022-02-24 NOTE — BH PSYCHOLOGY - GROUP THERAPY NOTE - NSPSYCHOLGRPCOGINT_PSY_A_CORE
group members provided support/group members suggested positive behaviors/mindfulness skills taught/relaxation skills practiced/other..

## 2022-02-24 NOTE — BH INPATIENT PSYCHIATRY PROGRESS NOTE - CURRENT MEDICATION
MEDICATIONS  (STANDING):  diVALproex  milliGRAM(s) Oral at bedtime  diVALproex  milliGRAM(s) Oral daily  influenza   Vaccine 0.5 milliLiter(s) IntraMuscular once  LORazepam     Tablet 0.25 milliGRAM(s) Oral three times a day  melatonin. 5 milliGRAM(s) Oral at bedtime  risperiDONE  Disintegrating Tablet 2 milliGRAM(s) Oral two times a day    MEDICATIONS  (PRN):  LORazepam     Tablet 2 milliGRAM(s) Oral every 4 hours PRN severe anxiety or agitation  OLANZapine 5 milliGRAM(s) Oral every 4 hours PRN severe agitation or aggression  OLANZapine Injectable 7.5 milliGRAM(s) IntraMuscular once PRN severe agitation or aggression  traZODone 100 milliGRAM(s) Oral at bedtime PRN insomnia  
MEDICATIONS  (STANDING):  influenza   Vaccine 0.5 milliLiter(s) IntraMuscular once  LORazepam     Tablet 2 milliGRAM(s) Oral three times a day  melatonin. 5 milliGRAM(s) Oral at bedtime  risperiDONE   Tablet 2 milliGRAM(s) Oral at bedtime    MEDICATIONS  (PRN):  gabapentin 300 milliGRAM(s) Oral four times a day PRN anxiety  LORazepam     Tablet 2 milliGRAM(s) Oral every 4 hours PRN severe anxiety or agitation  OLANZapine 5 milliGRAM(s) Oral every 4 hours PRN severe agitation or aggression  OLANZapine Injectable 7.5 milliGRAM(s) IntraMuscular once PRN severe agitation or aggression  traZODone 100 milliGRAM(s) Oral at bedtime PRN insomnia  
MEDICATIONS  (STANDING):  diVALproex  milliGRAM(s) Oral at bedtime  diVALproex  milliGRAM(s) Oral daily  influenza   Vaccine 0.5 milliLiter(s) IntraMuscular once  LORazepam     Tablet 0.25 milliGRAM(s) Oral three times a day  melatonin. 5 milliGRAM(s) Oral at bedtime  risperiDONE  Disintegrating Tablet 2 milliGRAM(s) Oral two times a day    MEDICATIONS  (PRN):  LORazepam     Tablet 2 milliGRAM(s) Oral every 4 hours PRN severe anxiety or agitation  OLANZapine 5 milliGRAM(s) Oral every 4 hours PRN severe agitation or aggression  OLANZapine Injectable 7.5 milliGRAM(s) IntraMuscular once PRN severe agitation or aggression  traZODone 100 milliGRAM(s) Oral at bedtime PRN insomnia  
MEDICATIONS  (STANDING):  ARIPiprazole 10 milliGRAM(s) Oral at bedtime  influenza   Vaccine 0.5 milliLiter(s) IntraMuscular once  traZODone 100 milliGRAM(s) Oral at bedtime    MEDICATIONS  (PRN):  gabapentin 300 milliGRAM(s) Oral four times a day PRN anxiety  LORazepam     Tablet 2 milliGRAM(s) Oral every 4 hours PRN severe anxiety or agitation  OLANZapine 5 milliGRAM(s) Oral every 4 hours PRN severe agitation or aggression  OLANZapine Injectable 10 milliGRAM(s) IntraMuscular once PRN severe agitation or aggression  
MEDICATIONS  (STANDING):  diVALproex  milliGRAM(s) Oral at bedtime  diVALproex  milliGRAM(s) Oral daily  influenza   Vaccine 0.5 milliLiter(s) IntraMuscular once  LORazepam     Tablet 0.5 milliGRAM(s) Oral three times a day  melatonin. 5 milliGRAM(s) Oral at bedtime  risperiDONE  Disintegrating Tablet 2 milliGRAM(s) Oral two times a day    MEDICATIONS  (PRN):  LORazepam     Tablet 2 milliGRAM(s) Oral every 4 hours PRN severe anxiety or agitation  OLANZapine 5 milliGRAM(s) Oral every 4 hours PRN severe agitation or aggression  OLANZapine Injectable 7.5 milliGRAM(s) IntraMuscular once PRN severe agitation or aggression  traZODone 100 milliGRAM(s) Oral at bedtime PRN insomnia  
MEDICATIONS  (STANDING):  diVALproex  milliGRAM(s) Oral two times a day  influenza   Vaccine 0.5 milliLiter(s) IntraMuscular once  LORazepam     Tablet 1 milliGRAM(s) Oral three times a day  melatonin. 5 milliGRAM(s) Oral at bedtime  risperiDONE  Disintegrating Tablet 2 milliGRAM(s) Oral two times a day    MEDICATIONS  (PRN):  gabapentin 300 milliGRAM(s) Oral four times a day PRN anxiety  LORazepam     Tablet 2 milliGRAM(s) Oral every 4 hours PRN severe anxiety or agitation  OLANZapine 5 milliGRAM(s) Oral every 4 hours PRN severe agitation or aggression  OLANZapine Injectable 7.5 milliGRAM(s) IntraMuscular once PRN severe agitation or aggression  traZODone 100 milliGRAM(s) Oral at bedtime PRN insomnia  
MEDICATIONS  (STANDING):  influenza   Vaccine 0.5 milliLiter(s) IntraMuscular once  LORazepam     Tablet 2 milliGRAM(s) Oral three times a day  melatonin. 5 milliGRAM(s) Oral at bedtime  risperiDONE   Tablet 2 milliGRAM(s) Oral at bedtime  risperiDONE  Disintegrating Tablet 1 milliGRAM(s) Oral daily    MEDICATIONS  (PRN):  gabapentin 300 milliGRAM(s) Oral four times a day PRN anxiety  LORazepam     Tablet 2 milliGRAM(s) Oral every 4 hours PRN severe anxiety or agitation  OLANZapine 5 milliGRAM(s) Oral every 4 hours PRN severe agitation or aggression  OLANZapine Injectable 7.5 milliGRAM(s) IntraMuscular once PRN severe agitation or aggression  traZODone 100 milliGRAM(s) Oral at bedtime PRN insomnia  
MEDICATIONS  (STANDING):  diVALproex  milliGRAM(s) Oral two times a day  influenza   Vaccine 0.5 milliLiter(s) IntraMuscular once  LORazepam     Tablet 1.5 milliGRAM(s) Oral three times a day  melatonin. 5 milliGRAM(s) Oral at bedtime  risperiDONE  Disintegrating Tablet 2 milliGRAM(s) Oral two times a day    MEDICATIONS  (PRN):  gabapentin 300 milliGRAM(s) Oral four times a day PRN anxiety  LORazepam     Tablet 2 milliGRAM(s) Oral every 4 hours PRN severe anxiety or agitation  OLANZapine 5 milliGRAM(s) Oral every 4 hours PRN severe agitation or aggression  OLANZapine Injectable 7.5 milliGRAM(s) IntraMuscular once PRN severe agitation or aggression  traZODone 100 milliGRAM(s) Oral at bedtime PRN insomnia  
MEDICATIONS  (STANDING):  diVALproex  milliGRAM(s) Oral at bedtime  diVALproex  milliGRAM(s) Oral daily  influenza   Vaccine 0.5 milliLiter(s) IntraMuscular once  LORazepam     Tablet 0.25 milliGRAM(s) Oral three times a day  melatonin. 5 milliGRAM(s) Oral at bedtime  risperiDONE  Disintegrating Tablet 2 milliGRAM(s) Oral two times a day    MEDICATIONS  (PRN):  LORazepam     Tablet 2 milliGRAM(s) Oral every 4 hours PRN severe anxiety or agitation  OLANZapine 5 milliGRAM(s) Oral every 4 hours PRN severe agitation or aggression  OLANZapine Injectable 7.5 milliGRAM(s) IntraMuscular once PRN severe agitation or aggression  traZODone 100 milliGRAM(s) Oral at bedtime PRN insomnia  
MEDICATIONS  (STANDING):  influenza   Vaccine 0.5 milliLiter(s) IntraMuscular once  LORazepam     Tablet 2 milliGRAM(s) Oral three times a day  melatonin. 5 milliGRAM(s) Oral at bedtime  risperiDONE   Tablet 2 milliGRAM(s) Oral at bedtime  risperiDONE  Disintegrating Tablet 1 milliGRAM(s) Oral daily    MEDICATIONS  (PRN):  gabapentin 300 milliGRAM(s) Oral four times a day PRN anxiety  LORazepam     Tablet 2 milliGRAM(s) Oral every 4 hours PRN severe anxiety or agitation  OLANZapine 5 milliGRAM(s) Oral every 4 hours PRN severe agitation or aggression  OLANZapine Injectable 7.5 milliGRAM(s) IntraMuscular once PRN severe agitation or aggression  traZODone 100 milliGRAM(s) Oral at bedtime PRN insomnia  
MEDICATIONS  (STANDING):  diVALproex  milliGRAM(s) Oral at bedtime  diVALproex  milliGRAM(s) Oral daily  influenza   Vaccine 0.5 milliLiter(s) IntraMuscular once  LORazepam     Tablet 0.5 milliGRAM(s) Oral three times a day  melatonin. 5 milliGRAM(s) Oral at bedtime  risperiDONE  Disintegrating Tablet 2 milliGRAM(s) Oral two times a day    MEDICATIONS  (PRN):  LORazepam     Tablet 2 milliGRAM(s) Oral every 4 hours PRN severe anxiety or agitation  OLANZapine 5 milliGRAM(s) Oral every 4 hours PRN severe agitation or aggression  OLANZapine Injectable 7.5 milliGRAM(s) IntraMuscular once PRN severe agitation or aggression  traZODone 100 milliGRAM(s) Oral at bedtime PRN insomnia

## 2022-02-24 NOTE — BH INPATIENT PSYCHIATRY PROGRESS NOTE - NSBHFUPINTERVALHXFT_PSY_A_CORE
Patient is follow up for SAD.  Patient is discussed in treatment team.  Per nursing report pt compliant with medication. No events reported overnight. The pt. is sleeping, no changes to appetite. The pt. describes his mood as "good." He states he is looking forward to discharge. He reports he is committed to remaining treatment adherent. The pt. denied AH/VH, S/i/p, H/i/p. He denies SE, vital signs stable. The pt. was instructed to call 911, visit the nearest ED or go to the crisis center if sx worsen.

## 2022-02-24 NOTE — BH INPATIENT PSYCHIATRY PROGRESS NOTE - NSTXMEDICDATETRGT_PSY_ALL_CORE
18-Feb-2022
24-Feb-2022
18-Feb-2022

## 2022-02-24 NOTE — BH INPATIENT PSYCHIATRY PROGRESS NOTE - NSTXMEDICGOAL_PSY_ALL_CORE
Take all medications as prescribed

## 2022-02-24 NOTE — SOCIAL WORK POST DISCHARGE FOLLOW UP NOTE - NSBHSWFOLLOWUP_PSY_ALL_CORE_FT
SALVADOR called pt to follow up on instructions for [facility] and Follow Up Appointment. SALVADOR notified pt that Monday appointment with Dr. Blanchard will be in person and notified that [facility] will be calling pt to coordinate care. Pt reported he understand, SW provided unit phone number incase pt has any questions.

## 2022-02-24 NOTE — BH INPATIENT PSYCHIATRY PROGRESS NOTE - NSBHMETABOLIC_PSY_ALL_CORE_FT
BMI: BMI (kg/m2): 72.5 (02-10-22 @ 19:40)  HbA1c: A1C with Estimated Average Glucose Result: 5.9 % (02-11-22 @ 09:15)    Glucose:   BP: --  Lipid Panel: Date/Time: 02-11-22 @ 09:15  Cholesterol, Serum: 267  Direct LDL: --  HDL Cholesterol, Serum: 48  Total Cholesterol/HDL Ration Measurement: --  Triglycerides, Serum: 162  
BMI: BMI (kg/m2): 72.5 (02-10-22 @ 19:40)  HbA1c: A1C with Estimated Average Glucose Result: 5.9 % (02-11-22 @ 09:15)    Glucose:   BP: 135/72 (02-23-22 @ 20:02) (135/72 - 135/72)  Lipid Panel: Date/Time: 02-11-22 @ 09:15  Cholesterol, Serum: 267  Direct LDL: --  HDL Cholesterol, Serum: 48  Total Cholesterol/HDL Ration Measurement: --  Triglycerides, Serum: 162

## 2022-02-24 NOTE — BH INPATIENT PSYCHIATRY PROGRESS NOTE - NSTXDCOTHRGOAL_PSY_ALL_CORE
Pt will report a decrease in mood symptoms and comply with discharge planning.

## 2022-02-24 NOTE — BH INPATIENT PSYCHIATRY PROGRESS NOTE - NSBHMSEKNOWHOW_PSY_ALL_CORE
Current Events
Educational attainment/Vocabulary
Current Events

## 2022-02-24 NOTE — BH INPATIENT PSYCHIATRY PROGRESS NOTE - NSTXDISORGDATETRGT_PSY_ALL_CORE
17-Feb-2022
02-Mar-2022
17-Feb-2022
24-Feb-2022
17-Feb-2022
17-Feb-2022

## 2022-02-24 NOTE — BH PSYCHOLOGY - GROUP THERAPY NOTE - NSBHPSYCHOLPARTICIPCOMMENT_PSY_A_CORE FT
Patient was fully engaged in group discussion. Patient shared when called upon and patient's comments were appropriate. Patient shared his opinions about the importance of processing negative emotions and using coping skills. Patient received support and validation from other group members and responded to them appropriately. Patient meaningfully contributed to the discussion and connected with others. Thought process was clear, speech was normal rate, tone, speed, and patient was oriented to time, place, and person.  
Patient was fully engaged in group discussion. Patient shared when called upon and patient's comments were appropriate. Patient shared that one cannot control everything and to practice self-compassion. Patient received support and validation from other group members and responded to them appropriately. Patient meaningfully contributed to the discussion and connected with others. Thought process was clear, speech was normal rate, tone, speed, and patient was oriented to time, place, and person.  
Patient was fully engaged in group discussion. Patient shared when called upon and patient's comments were appropriate. Patient shared his experience with practicing mindfulness and engaging with nature. Patient received support and validation from other group members and responded to them appropriately. Patient meaningfully contributed to the discussion and connected with others. Thought process was clear, speech was normal rate, tone, speed, and patient was oriented to time, place, and person.  
Patient was fully engaged in group discussion. Patient shared when called upon and patient's comments were appropriate. Patient shared the difficulties of not reacting to emotions impulsively. Patient received support and validation from other group members and responded to them appropriately. Patient meaningfully contributed to the discussion and connected with others. Thought process was clear, speech was normal rate, tone, speed, and patient was oriented to time, place, and person.  
Patient was fully engaged in group discussion. Patient shared when called upon and patient's comments were appropriate. Patient shared his treatment goal of addressing his feelings of anger. Patient received support and validation from other group members and responded to them appropriately. Patient meaningfully contributed to the discussion and connected with others. Thought process was clear, speech was normal rate, tone, speed, and patient was oriented to time, place, and person.

## 2022-02-24 NOTE — BH INPATIENT PSYCHIATRY DISCHARGE NOTE - NSCORESITESY/N_GEN_A_CORE_RD
3/16/2017    Gaye Zimmer  [facility]   [address]  [facility] [address]    RE: Sarah Longo       Dear Colleague,    I had the pleasure of seeing Sarah KAPOOR Donta in the [facility].    PRIMARY CARDIOLOGIST:  Dr. Feroz Burgos.      REASON FOR VISIT:  Coronary artery disease, angiogram followup.      Ms. Longo is a 70-year-old woman with past medical history significant for the followin.  Coronary artery disease with history of stenting back to  where she underwent a Taxus stent to her RCA.  She redeveloped symptoms in the spring of 2016; her anginal equivalent is chest and back discomfort.  She had an abnormal stress test and went for an angiogram where she was found to have a trifurcation lesion of the OM2 as well as the distal LAD lesion.  This was initially managed medically, but she ended up at [facility] with trifurcation stents to her superior and main branch of the obtuse marginal and dilated the inferior branch.  She also had an LAD stent at that time.  She developed symptoms again in the  and was taken back to the Cath Lab and found to have a 90% restenosis of proximal OM bifurcation stent in the superior branch.  This was stented.  Unfortunately, she continued to have discomfort and had a stress test showing mild ischemia and went again back for an angiogram; this was .  Here, she was found to have patent stents, negative FFR across her PDA with her ongoing 80% LAD lesion.   2.  Dyslipidemia, intolerant to statins, on Praluent.   3.  Hypertension.   4.  Type 2 diabetes.   5.  Type 1 Brugada pattern provoked by fever and possible Lamictal use (this differs from Brugada syndrome).  Please refer to consult by Dr. Nation 2016.   6.  Pain in her left wrist with difficult access from the radial site.      She comes in today for routine followup.  She was seen by Ms. Daria PA-C, last week for her left wrist pain.  She had good pulses  then and the pain was improving and there was no further workup required.  She tells me this week her pain has continued to improve.  Her symptoms overall are okay.  She continues to have coldness in her fingers and sometimes the fingers get white.  Her cold hands do not really bother her and she does not have to wear gloves in the grocery store.  Her cardiac symptoms are otherwise stable.  She still has dyspnea on exertion but not chest pain at this point.  She has been working without difficulty.  She tells me that her endocrinologist would like her to go on insulin, but she does not want to.  She refuses to tell me her hemoglobin A1c numbers because they are too high.      SOCIAL HISTORY:  She is .  Her 's name is Kwadwo.  They have a dog at home.  She is a lifelong nonsmoker, no alcohol use.  Previously was eating poorly, 3 hamburgers a week, now working on changing her diet.  She is exercising.  She has 3 children, including a daughter who is 44, trying to conceive by in vitro fertilization.  She also has a granddaughter who is 18, who wants to become a PA and is a type 1 diabetic, resistant on going on a pump.      PHYSICAL EXAMINATION:   GENERAL:  Well-developed, well-nourished, chatty woman in no acute distress.   HEENT:  Normocephalic, atraumatic.   HEART:  Regular in rate and rhythm.  I do not appreciate murmur, rub or gallop.  Carotids are without bruit.   LUNGS:  Clear bilaterally.   EXTREMITIES:  Without peripheral edema, 2+ radial and ulnar pulses bilaterally.     Outpatient Encounter Prescriptions as of 3/16/2017   Medication Sig Dispense Refill     evolocumab (REPATHA SURECLICK) 140 MG/ML prefilled autoinjector Inject 1 mL (140 mg) Subcutaneous every 14 days (Patient taking differently: Inject 140 mg Subcutaneous every 14 days Has not started) 2 mL 11     Camphor-Menthol-Methyl Sal (SALONPAS) 1.2-5.7-6.3 % PTCH Patient uses 3-4 patches daily       linagliptin-metFORMIN (JENTADUETO)  2.5-1000 MG per tablet Take 1 tablet by mouth 2 times daily (with meals)       PANTOPRAZOLE SODIUM PO Take 20 mg by mouth 2 times daily (before meals)        lisinopril (PRINIVIL/ZESTRIL) 2.5 MG tablet Take 1 tablet (2.5 mg) by mouth daily 90 tablet 3     nebivolol (BYSTOLIC) 2.5 MG tablet Take 1 tablet (2.5 mg) by mouth daily 90 tablet 3     isosorbide mononitrate (IMDUR) 60 MG 24 hr tablet Take 1 tablet (60 mg) by mouth daily (Patient taking differently: Take 60 mg by mouth daily Patient has been taking 45 mg) 90 tablet 3     cholecalciferol 2000 UNITS CAPS Take 1 capsule by mouth daily       amoxicillin (AMOXIL) 500 MG capsule PRN before dental work       NONFORMULARY Tumeric 500 mg bid       clopidogrel (PLAVIX) 75 MG tablet Take 1 tablet (75 mg) by mouth daily 90 tablet 3     mirabegron (MYRBETRIQ) 50 MG 24 hr tablet Take 1 tablet (50 mg) by mouth daily 90 tablet 3     Gymnema Sylvestris Leaf POWD 500 mg 2 times daily        nitroglycerin (NITROSTAT) 0.4 MG SL tablet Place 1 tablet (0.4 mg) under the tongue every 5 minutes as needed for chest pain If symptoms persist after 3 doses (15 minutes) call 911. 25 tablet 3     alirocumab (PRALUENT) 75 MG/ML injectable pen Inject 1 mL (75 mg) Subcutaneous every 14 days 2 mL 11     Omega-3 Fatty Acids (OMEGA-3 FISH OIL PO) Take 2,400 mg by mouth 2 times daily (with meals)       CINNAMON PO Take 2 capsules by mouth 2 times daily       GLIMEPIRIDE 4 MG OR TABS 1 tablet BID       SYNTHROID 125 MCG OR TABS 1 tablet daily       RESTASIS 0.05 % OP EMUL twice daily       CO ENZYME Q-10 50 MG OR CAPS daliy  0     ASPIRIN 81 MG OR TABS 1 tab po QD (Once per day)       STATIN NOT PRESCRIBED, INTENTIONAL, 1 each At Bedtime Statin not prescribed intentionally due to Allergy to statin (Patient not taking: Reported on 3/16/2017) 0 each 0     No facility-administered encounter medications on file as of 3/16/2017.       ASSESSMENT AND PLAN:   1.  Coronary artery disease with recent  angiogram without significant restenosis.  We discussed limiting her risk factors for future MI by appropriate diet, exercise, excellent lipid control and most importantly control of her diabetes.  I did encourage her to consider going on insulin if this would improve her A1c; she is considering.  Otherwise, at this point her anginal symptoms are fairly stable to resolved.   2.  Dyslipidemia, intolerant to statins.  Now switching to Repatha due to cost issues.  Excellent control.  LDL 64, HDL 70, total cholesterol 148.   3.  Hypertension, well-controlled.   4.  Cold hands, likely secondary to Bystolic adverse effects.  She is fine staying on it now that she knows what she is doing.   5.  Type 2 diabetes.  Again, I strongly recommend if her endocrinologist is recommending insulin to get her A1c under control, I would agree.  I encouraged her to look at what the regimen would look like before deciding absolutely she will not do it.  She is agreeable to that.      Thank you for allowing me to participate in this delightful patient's care.  We will have her follow up as scheduled with Dr. Burgos in May, sooner with concerns.     Sincerely,    Karen Alexander PA-C     [facility]       Yes

## 2022-02-24 NOTE — BH PSYCHOLOGY - GROUP THERAPY NOTE - NSPSYCHOLGRPCOGPROB_PSY_A_CORE FT
Anxiety, Depression, Emotion Dysregulation, Lack of coping skills, Psycho-social stressors, self-care, problem solving  

## 2022-02-24 NOTE — BH INPATIENT PSYCHIATRY DISCHARGE NOTE - NSDCPROCEDURES_PSY_ALL_CORE
Problem: Pressure Injury, Risk for  Goal: No new pressure injury (PI) development  Outcome: Outcome Met, Continue evaluating goal progress toward completion     Problem: VTE, Risk for  Goal: # No s/s of VTE  Outcome: Outcome Met, Continue evaluating goal progress toward completion     Problem: At Risk for Falls  Goal: # Takes action to control fall-related risks  Outcome: Outcome Met, Continue evaluating goal progress toward completion      No significant procedures or tests performed during this admission,

## 2022-02-24 NOTE — BH PSYCHOLOGY - GROUP THERAPY NOTE - NSPSYCHOLGRPCOGGOAL_PSY_A_CORE FT
Decrease symptoms, develop coping/emotion regulation skills, psychoeducation  

## 2022-02-24 NOTE — BH INPATIENT PSYCHIATRY PROGRESS NOTE - NSCGIIMPROVESX_PSY_ALL_CORE
3 = Minimally improved - slightly better with little or no clinically meaningful reduction of symptoms.  Represents very little change in basic clinical status, level of care, or functional capacity.

## 2022-02-24 NOTE — BH INPATIENT PSYCHIATRY PROGRESS NOTE - NSBHMSEAFFQUAL_PSY_A_CORE
Euthymic
Euthymic/Depressed
Euthymic/Depressed
Euthymic
Euthymic
Euthymic/Elevated/Irritable
Euthymic/Elevated/Irritable
Euthymic
Euthymic
Euthymic/Depressed
Depressed

## 2022-02-24 NOTE — BH INPATIENT PSYCHIATRY PROGRESS NOTE - NSTXDISORGDATEEST_PSY_ALL_CORE
10-Feb-2022
23-Feb-2022
10-Feb-2022
10-Feb-2022
23-Feb-2022
10-Feb-2022

## 2022-02-24 NOTE — BH INPATIENT PSYCHIATRY PROGRESS NOTE - NSBHCONSBHPROVDETAILS_PSY_A_CORE  FT
Spoke with Dr. Blanchard

## 2022-02-24 NOTE — BH PSYCHOLOGY - GROUP THERAPY NOTE - NSPSYCHOLGRPCOGSPCH_PSY_A_CORE FT
normal rate tone and speed

## 2022-02-24 NOTE — BH INPATIENT PSYCHIATRY PROGRESS NOTE - NSTXPROBDCOTHR_PSY_ALL_CORE
DISCHARGE ISSUE - OTHER

## 2022-02-24 NOTE — BH INPATIENT PSYCHIATRY DISCHARGE NOTE - NSBHMETABOLIC_PSY_ALL_CORE_FT
BMI: BMI (kg/m2): 72.5 (02-10-22 @ 19:40)  HbA1c: A1C with Estimated Average Glucose Result: 5.9 % (02-11-22 @ 09:15)    Glucose:   BP: 135/72 (02-23-22 @ 20:02) (135/72 - 135/72)  Lipid Panel: Date/Time: 02-11-22 @ 09:15  Cholesterol, Serum: 267  Direct LDL: --  HDL Cholesterol, Serum: 48  Total Cholesterol/HDL Ration Measurement: --  Triglycerides, Serum: 162

## 2022-02-24 NOTE — BH INPATIENT PSYCHIATRY PROGRESS NOTE - NSBHFUPINTERVALCCFT_PSY_A_CORE
"I'm feeling alright"
"I don't feel as guarded"
Pt seen f/u for psychosis
Pt. is looking forward to discharge
Pt seen f/u for psychosis
Pt seen f/u for psychosis
"They're flying monkeys"
"I feel triggered"
"The flying monkey's are my fans"
Pt. is looking forward to discharge
Pt. reports he would like to  to [place]

## 2022-02-24 NOTE — BH INPATIENT PSYCHIATRY PROGRESS NOTE - NSBHCONSULTIPREASON_PSY_A_CORE
danger to others; mental illness expected to respond to inpatient care

## 2022-02-24 NOTE — BH PSYCHOLOGY - GROUP THERAPY NOTE - NSPSYCHOLGRPCOGPT_PSY_A_CORE
participated in exercise/gave feedback to others/other...
participated in exercise/other...

## 2022-02-24 NOTE — BH INPATIENT PSYCHIATRY DISCHARGE NOTE - DESCRIPTION
Patient currently lives with mother and cousin in [place]. He graduated high school. Patient moved around growing up, states he lived in [place], at age 12 moved south to live with uncle due to mother having psychotic episodes, moved back to [facility] with mother at age 15. States he had friends growing up. States he got C's in high school. Was going to attend community college but decided not to because he wanted to pursue music. Used to work at [employer].

## 2022-02-24 NOTE — BH INPATIENT PSYCHIATRY PROGRESS NOTE - NSBHCHARTREVIEWVS_PSY_A_CORE FT
Vital Signs Last 24 Hrs  T(C): 36.4 (02-24-22 @ 06:42), Max: 36.8 (02-23-22 @ 20:02)  T(F): 97.5 (02-24-22 @ 06:42), Max: 98.2 (02-23-22 @ 20:02)  HR: --  BP: 135/72 (02-23-22 @ 20:02) (135/72 - 135/72)  BP(mean): 82 (02-23-22 @ 20:02) (82 - 82)  RR: 16 (02-24-22 @ 09:15) (16 - 18)  SpO2: --    Orthostatic VS  02-24-22 @ 07:58  Lying BP: --/-- HR: --  Sitting BP: 145/84 HR: 96  Standing BP: 151/86 HR: 113  Site: upper left arm  Mode: electronic  Orthostatic VS  02-23-22 @ 08:38  Lying BP: --/-- HR: --  Sitting BP: 142/71 HR: 86  Standing BP: 143/81 HR: 94  Site: --  Mode: --  Orthostatic VS  02-22-22 @ 19:36  Lying BP: --/-- HR: --  Sitting BP: 144/84 HR: 95  Standing BP: 128/66 HR: 112  Site: --  Mode: --

## 2022-02-24 NOTE — BH INPATIENT PSYCHIATRY DISCHARGE NOTE - NSDCMRMEDTOKEN_GEN_ALL_CORE_FT
divalproex sodium 250 mg oral delayed release tablet: 1 tab(s) orally once a day (at bedtime)   divalproex sodium 500 mg oral delayed release tablet: 1 tab(s) orally in the morning and at bedtime   risperiDONE 2 mg oral tablet: 1 tab(s) orally in the morning and at bedtime

## 2022-02-24 NOTE — BH INPATIENT PSYCHIATRY PROGRESS NOTE - NSTXDCOTHRDATETRGT_PSY_ALL_CORE
18-Feb-2022
25-Feb-2022
18-Feb-2022
25-Feb-2022
18-Feb-2022
25-Feb-2022
18-Feb-2022
18-Feb-2022

## 2022-02-24 NOTE — BH INPATIENT PSYCHIATRY PROGRESS NOTE - NSBHMSESPABN_PSY_A_CORE
Increased productivity
Pressured rate
Increased productivity
Pressured rate
Increased productivity
Pressured rate/Increased productivity
Pressured rate/Increased productivity
Increased productivity
Increased productivity

## 2022-02-24 NOTE — BH PSYCHOLOGY - GROUP THERAPY NOTE - NSBHPSYCHOLGRPTYPE_PSY_A_CORE
Cognitive Behavioral Coping Skills

## 2022-02-24 NOTE — BH PSYCHOLOGY - GROUP THERAPY NOTE - NSPSYCHOLGRPBILLING_PSY_A_CORE
[id number] - Group Psychotherapy
[id number] - Group Psychotherapy
[id number] - Group Psychotherapy
[id number] - Group Psychotherapy
[id number] - Group Psychotherapy

## 2022-02-24 NOTE — BH INPATIENT PSYCHIATRY PROGRESS NOTE - NSTXPROBDISORG_PSY_ALL_CORE
DISORGANIZATION OF THOUGHT/BEHAVIOR

## 2023-01-22 ENCOUNTER — INPATIENT (INPATIENT)
Facility: HOSPITAL | Age: 23
LOS: 23 days | Discharge: HOME | End: 2023-02-15
Attending: PSYCHIATRY & NEUROLOGY | Admitting: PSYCHIATRY & NEUROLOGY
Payer: COMMERCIAL

## 2023-01-22 VITALS
DIASTOLIC BLOOD PRESSURE: 74 MMHG | OXYGEN SATURATION: 98 % | RESPIRATION RATE: 16 BRPM | TEMPERATURE: 98 F | HEART RATE: 106 BPM | SYSTOLIC BLOOD PRESSURE: 149 MMHG

## 2023-01-22 DIAGNOSIS — F22 DELUSIONAL DISORDERS: ICD-10-CM

## 2023-01-22 DIAGNOSIS — F25.0 SCHIZOAFFECTIVE DISORDER, BIPOLAR TYPE: ICD-10-CM

## 2023-01-22 LAB
ALBUMIN SERPL ELPH-MCNC: 4.6 G/DL — SIGNIFICANT CHANGE UP (ref 3.3–5)
ALP SERPL-CCNC: 94 U/L — SIGNIFICANT CHANGE UP (ref 40–120)
ALT FLD-CCNC: 23 U/L — SIGNIFICANT CHANGE UP (ref 4–41)
AMPHET UR-MCNC: NEGATIVE — SIGNIFICANT CHANGE UP
ANION GAP SERPL CALC-SCNC: 14 MMOL/L — SIGNIFICANT CHANGE UP (ref 7–14)
APAP SERPL-MCNC: <10 UG/ML — LOW (ref 15–25)
APPEARANCE UR: CLEAR — SIGNIFICANT CHANGE UP
AST SERPL-CCNC: 18 U/L — SIGNIFICANT CHANGE UP (ref 4–40)
BACTERIA # UR AUTO: NEGATIVE — SIGNIFICANT CHANGE UP
BARBITURATES UR SCN-MCNC: NEGATIVE — SIGNIFICANT CHANGE UP
BASOPHILS # BLD AUTO: 0.03 K/UL — SIGNIFICANT CHANGE UP (ref 0–0.2)
BASOPHILS NFR BLD AUTO: 0.2 % — SIGNIFICANT CHANGE UP (ref 0–2)
BENZODIAZ UR-MCNC: NEGATIVE — SIGNIFICANT CHANGE UP
BILIRUB SERPL-MCNC: 1 MG/DL — SIGNIFICANT CHANGE UP (ref 0.2–1.2)
BILIRUB UR-MCNC: NEGATIVE — SIGNIFICANT CHANGE UP
BUN SERPL-MCNC: 9 MG/DL — SIGNIFICANT CHANGE UP (ref 7–23)
CALCIUM SERPL-MCNC: 9.7 MG/DL — SIGNIFICANT CHANGE UP (ref 8.4–10.5)
CHLORIDE SERPL-SCNC: 101 MMOL/L — SIGNIFICANT CHANGE UP (ref 98–107)
CO2 SERPL-SCNC: 24 MMOL/L — SIGNIFICANT CHANGE UP (ref 22–31)
COCAINE METAB.OTHER UR-MCNC: NEGATIVE — SIGNIFICANT CHANGE UP
COLOR SPEC: YELLOW — SIGNIFICANT CHANGE UP
CREAT SERPL-MCNC: 0.95 MG/DL — SIGNIFICANT CHANGE UP (ref 0.5–1.3)
CREATININE URINE RESULT, DAU: 346 MG/DL — SIGNIFICANT CHANGE UP
DIFF PNL FLD: NEGATIVE — SIGNIFICANT CHANGE UP
EGFR: 116 ML/MIN/1.73M2 — SIGNIFICANT CHANGE UP
EOSINOPHIL # BLD AUTO: 0.17 K/UL — SIGNIFICANT CHANGE UP (ref 0–0.5)
EOSINOPHIL NFR BLD AUTO: 1.4 % — SIGNIFICANT CHANGE UP (ref 0–6)
EPI CELLS # UR: 3 /HPF — SIGNIFICANT CHANGE UP (ref 0–5)
ETHANOL SERPL-MCNC: <10 MG/DL — SIGNIFICANT CHANGE UP
GLUCOSE SERPL-MCNC: 86 MG/DL — SIGNIFICANT CHANGE UP (ref 70–99)
GLUCOSE UR QL: NEGATIVE — SIGNIFICANT CHANGE UP
HCT VFR BLD CALC: 45.8 % — SIGNIFICANT CHANGE UP (ref 39–50)
HGB BLD-MCNC: 14 G/DL — SIGNIFICANT CHANGE UP (ref 13–17)
HYALINE CASTS # UR AUTO: 2 /LPF — SIGNIFICANT CHANGE UP (ref 0–7)
IANC: 8.25 K/UL — HIGH (ref 1.8–7.4)
IMM GRANULOCYTES NFR BLD AUTO: 0.5 % — SIGNIFICANT CHANGE UP (ref 0–0.9)
KETONES UR-MCNC: NEGATIVE — SIGNIFICANT CHANGE UP
LEUKOCYTE ESTERASE UR-ACNC: NEGATIVE — SIGNIFICANT CHANGE UP
LYMPHOCYTES # BLD AUTO: 2.6 K/UL — SIGNIFICANT CHANGE UP (ref 1–3.3)
LYMPHOCYTES # BLD AUTO: 21.5 % — SIGNIFICANT CHANGE UP (ref 13–44)
MCHC RBC-ENTMCNC: 21.9 PG — LOW (ref 27–34)
MCHC RBC-ENTMCNC: 30.6 GM/DL — LOW (ref 32–36)
MCV RBC AUTO: 71.6 FL — LOW (ref 80–100)
METHADONE UR-MCNC: NEGATIVE — SIGNIFICANT CHANGE UP
MONOCYTES # BLD AUTO: 0.97 K/UL — HIGH (ref 0–0.9)
MONOCYTES NFR BLD AUTO: 8 % — SIGNIFICANT CHANGE UP (ref 2–14)
NEUTROPHILS # BLD AUTO: 8.25 K/UL — HIGH (ref 1.8–7.4)
NEUTROPHILS NFR BLD AUTO: 68.4 % — SIGNIFICANT CHANGE UP (ref 43–77)
NITRITE UR-MCNC: NEGATIVE — SIGNIFICANT CHANGE UP
NRBC # BLD: 0 /100 WBCS — SIGNIFICANT CHANGE UP (ref 0–0)
NRBC # FLD: 0 K/UL — SIGNIFICANT CHANGE UP (ref 0–0)
OPIATES UR-MCNC: POSITIVE
OXYCODONE UR-MCNC: NEGATIVE — SIGNIFICANT CHANGE UP
PCP SPEC-MCNC: SIGNIFICANT CHANGE UP
PCP UR-MCNC: NEGATIVE — SIGNIFICANT CHANGE UP
PH UR: 6 — SIGNIFICANT CHANGE UP (ref 5–8)
PLATELET # BLD AUTO: 380 K/UL — SIGNIFICANT CHANGE UP (ref 150–400)
POTASSIUM SERPL-MCNC: 3.8 MMOL/L — SIGNIFICANT CHANGE UP (ref 3.5–5.3)
POTASSIUM SERPL-SCNC: 3.8 MMOL/L — SIGNIFICANT CHANGE UP (ref 3.5–5.3)
PROT SERPL-MCNC: 7.9 G/DL — SIGNIFICANT CHANGE UP (ref 6–8.3)
PROT UR-MCNC: ABNORMAL
RBC # BLD: 6.4 M/UL — HIGH (ref 4.2–5.8)
RBC # FLD: 16.6 % — HIGH (ref 10.3–14.5)
RBC CASTS # UR COMP ASSIST: 1 /HPF — SIGNIFICANT CHANGE UP (ref 0–4)
SALICYLATES SERPL-MCNC: <0.3 MG/DL — LOW (ref 15–30)
SODIUM SERPL-SCNC: 139 MMOL/L — SIGNIFICANT CHANGE UP (ref 135–145)
SP GR SPEC: 1.03 — SIGNIFICANT CHANGE UP (ref 1.01–1.05)
THC UR QL: NEGATIVE — SIGNIFICANT CHANGE UP
TOXICOLOGY SCREEN, DRUGS OF ABUSE, SERUM RESULT: SIGNIFICANT CHANGE UP
TSH SERPL-MCNC: 1.16 UIU/ML — SIGNIFICANT CHANGE UP (ref 0.27–4.2)
UROBILINOGEN FLD QL: SIGNIFICANT CHANGE UP
VALPROATE SERPL-MCNC: <3.15 UG/ML — LOW (ref 50–100)
WBC # BLD: 12.08 K/UL — HIGH (ref 3.8–10.5)
WBC # FLD AUTO: 12.08 K/UL — HIGH (ref 3.8–10.5)
WBC UR QL: 1 /HPF — SIGNIFICANT CHANGE UP (ref 0–5)

## 2023-01-22 PROCEDURE — 99285 EMERGENCY DEPT VISIT HI MDM: CPT

## 2023-01-22 RX ORDER — HALOPERIDOL DECANOATE 100 MG/ML
5 INJECTION INTRAMUSCULAR ONCE
Refills: 0 | Status: COMPLETED | OUTPATIENT
Start: 2023-01-22 | End: 2023-01-22

## 2023-01-22 RX ADMIN — HALOPERIDOL DECANOATE 5 MILLIGRAM(S): 100 INJECTION INTRAMUSCULAR at 21:14

## 2023-01-22 RX ADMIN — Medication 2 MILLIGRAM(S): at 21:14

## 2023-01-22 NOTE — ED PROVIDER NOTE - PROGRESS NOTE DETAILS
Jasmin Laurent MD attending physician.  Patient signed out to me awaiting bed.  Patient can be admitted to 1 [address]. under [email or web address]

## 2023-01-22 NOTE — ED PROVIDER NOTE - CLINICAL SUMMARY MEDICAL DECISION MAKING FREE TEXT BOX
21 y/o M  hx  Schizophrenia   Labs, Urine, Tox/UA, EKG  Medical evaluation performed. There is no clinical evidence of intoxication or any acute medical problem requiring immediate intervention. Patient is awaiting psychiatric consultation. Final disposition will be determined by psychiatrist.

## 2023-01-22 NOTE — ED BEHAVIORAL HEALTH ASSESSMENT NOTE - NSBHATTESTCOMMENTATTENDFT_PSY_A_CORE
Patient is a 23 yo single  male domiciled with mother in [place], employed with [employer], with documented history of mental illness (schizoaffective disorder, bipolar type, cannabis and alcohol use disorder) and unclear PMH who was bibems activated by mother on account of worsening verbal aggression and auditory hallucinations in context of treatment noncompliance.    Based on evaluation, patient is exhibiting psychotic symptoms in context of poor treatment adherence. As suggested by collateral information, he poses risk of danger to self and others as evidenced by his recent purchase of knives and machete. Hence, he needs inpatient psychiatric admission as least restrictive environment for safety and psychiatric stabilization till safe discharge is possible.

## 2023-01-22 NOTE — ED BEHAVIORAL HEALTH ASSESSMENT NOTE - PSYCHIATRIC ISSUES AND PLAN (INCLUDE STANDING AND PRN MEDICATION)
Patient will be started on the following medications: Risperidone 1 mg po BID for psychosis, starting with one dose of Risperidone 1 mg po tonight. For agitation: Haldol 5 mg po Q 6 Hrs PRN and Ativan 2 mg po Q 6 Hrs Prn

## 2023-01-22 NOTE — ED BEHAVIORAL HEALTH ASSESSMENT NOTE - HPI (INCLUDE ILLNESS QUALITY, SEVERITY, DURATION, TIMING, CONTEXT, MODIFYING FACTORS, ASSOCIATED SIGNS AND SYMPTOMS)
Patient is a 21 yo single  male domiciled with mother in [place], employed with [employer], with documented history of mental illness (schizoaffective disorder, bipolar type, cannabis and alcohol use disorder) and unclear PMH who was bibems activated by mother on account of worsening verbal aggression and auditory hallucinations in context of treatment noncompliance.    On evaluation, patient states his neighbors downstairs have been invading his privacy, making noise to get under his skin and have been using an eve to access his phone screen. He also reports belief that his mother and her "everyone" hate him as a group. He says last week at work, "the entire facility was trying to alienate me." He states he has left everyone alone, his family and friends, "trying to make new connections." Patient presenting with persecutory and paranoid delusions although he denies auditory, visual and other forms of hallucinations. He says his mood has been "wonderful," although denies increased energy, racing thoughts, reduced need for sleep and other history suggestive of cristine at this time. He states he has an upcoming test to enlist in the [employer] and he denies suicidal and homicidal ideations, intent, or plan.     Per collateral obtained from mother: patient has internally preoccupied, hearing voices, has not been sleeping, increasingly paranoid to the point of buying knives, machete and making threats. He has not been in psychiatric treatment since his last psychiatric admission. Mother is concerned patient is "going to hurt someone." Patient is a 21 yo single  male domiciled with mother in [place], employed with [employer], with documented history of mental illness (schizoaffective disorder, bipolar type, cannabis and alcohol use disorder) and unclear PMH who was bibems activated by mother on account of worsening verbal aggression and auditory hallucinations in context of treatment noncompliance.    On evaluation, patient states his neighbors downstairs have been invading his privacy, making noise to get under his skin and have been using an eve to access his phone screen. He also reports belief that his mother and her "everyone" hate him as a group. He says last week at work, "the entire facility was trying to alienate me." He states he has left everyone alone, his family and friends, "trying to make new connections." Patient presenting with persecutory and paranoid delusions although he denies auditory, visual and other forms of hallucinations. He says his mood has been "wonderful," although denies increased energy, racing thoughts, reduced need for sleep and other history suggestive of cristine at this time. He states he has an upcoming test to enlist in the [employer] and he denies suicidal and homicidal ideations, intent, or plan.     Per collateral obtained from mother: patient has internally preoccupied, hearing voices, has not been sleeping, increasingly paranoid to the point of buying knives, machete and making threats. He has not been in psychiatric treatment since his last psychiatric admission. Mother is concerned patient is "going to hurt someone."  Please refer to SW note. Patient is a 21 yo single  male domiciled with mother in [place], employed with [employer], with documented history of mental illness (schizoaffective disorder, bipolar type, cannabis and alcohol use disorder) and unclear PMH who was bibems activated by mother on account of worsening verbal aggression and auditory hallucinations in context of treatment noncompliance.    Patient was given IM medication upon arrival due to agitation and would not cooperate. On evaluation, patient states his neighbors downstairs have been invading his privacy, making noise to get under his skin and have been using an eve to access his phone screen. He also reports belief that his mother and her "everyone" hate him as a group. He says last week at work, "the entire facility was trying to alienate me." He states he has left everyone alone, his family and friends, "trying to make new connections." Patient presenting with persecutory and paranoid delusions although he denies auditory, visual and other forms of hallucinations. He says his mood has been "wonderful," although denies increased energy, racing thoughts, reduced need for sleep and other history suggestive of cristine at this time. He states he has an upcoming test to enlist in the [employer] and he denies suicidal and homicidal ideations, intent, or plan.     Per collateral obtained from mother: patient has internally preoccupied, hearing voices, has not been sleeping, increasingly paranoid to the point of buying knives, machete and making threats. He has not been in psychiatric treatment since his last psychiatric admission. Mother is concerned patient is "going to hurt someone."  Please refer to SW note.

## 2023-01-22 NOTE — ED BEHAVIORAL HEALTH NOTE - BEHAVIORAL HEALTH NOTE
COVID Exposure Screen- Patient     1.        *Have you had a COVID-19 test in the last 21 days?  (  ) Yes   ( X ) No   (  ) Unknown- Reason: ______  IF YES PROCEED TO QUESTION #2. IF NO OR UNKNOWN THEN PLEASE SKIP TO QUESTION #3.  2.        Date of test: ________  3.        3. Do you know the result? (  ) Negative   (  ) Positive   (  X) No result available  4.        *In the past 14 days, have you been around anyone with a positive COVID-19 test?*  (  ) Yes   (X  ) No   (  ) Unknown- Reason (e.g. patient uncertain, sedated, refusing to answer, etc.):  ______  IF YES PROCEED TO QUESTION #5. IF NO or UNKNOWN, PLEASE SKIP TO QUESTION #10  5.        Were you within 6 feet of them for at least 15 minutes? (  ) Yes   (  ) No   (  ) Unknown- Reason: _____  6.        Have you provided care for them? (  ) Yes   (  ) No   (  ) Unknown- Reason: ______  7.        Have you had direct physical contact with them (touched, hugged, or kissed them)? (  ) Yes   (  ) No    (  ) Unknown- Reason: ___  8.        Have you shared eating or drinking utensils with them? (  ) Yes   (  ) No    (  ) Unknown- Reason: ____  9.        Have they sneezed, coughed, or somehow got respiratory droplets on you? (  ) Yes   (  ) No    (  ) Unknown- Reason: ______  10.     *Have you been out of [place] within the past 14 days?*  (  ) Yes   (X  ) No   (  ) Unknown- Reason (e.g. patient uncertain, sedated, refusing to answer, etc.): _______  IF YES PLEASE ANSWER THE FOLLOWING QUESTIONS:  11.     Which state/country have you been to? ______  12.     Were you there over 24 hours? (  ) Yes   (  ) No    (  ) Unknown- Reason: ______  13.     Date of return to [facility]: ______

## 2023-01-22 NOTE — ED ADULT NURSE NOTE - OBJECTIVE STATEMENT
Pt received in . Pt brought in by police in handcuffs due to trying to run during questioning at home. Pt reports that he called police due to trouble at home with other residents at house, reports getting frustrated during questioning and becoming verbally aggressive. Pt becomes agitated during questioning upon arrival, medicated as per orders. Pt denies SI/HI/AH/VH, reports being prescribed medications in the past but hasn't been taking them for a while - unknown medications. Safety precautions in place and to be maintained.

## 2023-01-22 NOTE — ED BEHAVIORAL HEALTH ASSESSMENT NOTE - NS ED BHA PLAN ADMIT TO PSYCHIATRY BH CONTACTED FT
will sned an email to Dr. Blanchard at [facility] will send an email to Dr. Blanchard at [facility]

## 2023-01-22 NOTE — ED ADULT NURSE NOTE - CAS EDN DISCHARGE ASSESSMENT
Alert and oriented to person, place and time calm and cooperative when leaving/Alert and oriented to person, place and time

## 2023-01-22 NOTE — ED BEHAVIORAL HEALTH ASSESSMENT NOTE - PATIENT'S CHIEF COMPLAINT
"I've been getting upset, people downstairs are being nosey, invading my privacy, noise gets to my skin."
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
## 2023-01-22 NOTE — ED BEHAVIORAL HEALTH ASSESSMENT NOTE - FAMILY HISTORY OF PSYCHIATRIC ILLNESS / SUICIDALITY
"Called [phone] - spoke with patient's spouse. Spouse stated that patient was trying to take a nap, but allowed RN to talk to patient briefly.    ED/Discharge Protocol    \"Hi, my name is Joycelyn Jones, a registered nurse, and I am calling on behalf of Dr. Gordillo's office at [place].  I am calling to follow up and see how things are going for you after your recent visit.\"    \"I see that you were in the (ER/UC/IP) on [facility] for inpatient hospital stay on 3/11 for hx of aortic root repair, chest pain, chest hematoma.    How are you doing now that you are home?\" Doing Pretty Good (denies CP)    Is patient experiencing symptoms that may require a hospital visit?  No    Patient's spouse stated that she has a  at the house at this time and will call the clinic back to finish the hospital FU call.      Joycelyn Jones RN  [place] Triage      " None known

## 2023-01-22 NOTE — ED BEHAVIORAL HEALTH ASSESSMENT NOTE - SUMMARY
Patient is a 21 yo single  male domiciled with mother in [place], employed with [employer], with documented history of mental illness (schizoaffective disorder, bipolar type, cannabis and alcohol use disorder) and unclear PMH who was bibems activated by mother on account of worsening verbal aggression and auditory hallucinations in context of treatment noncompliance.    Based on evaluation, patient is exhibiting psychotic symptoms in context of poor treatment adherence. As suggested by collateral information, he poses risk of danger to self and others as evidenced by his recent purchase of knives and machete. Hence, he needs inpatient psychiatric admission as least restrictive environment for safety and psychiatric stabilization till safe discharge is possible.

## 2023-01-22 NOTE — ED BEHAVIORAL HEALTH ASSESSMENT NOTE - OTHER PAST PSYCHIATRIC HISTORY (INCLUDE DETAILS REGARDING ONSET, COURSE OF ILLNESS, INPATIENT/OUTPATIENT TREATMENT)
Patient has been diagnosed with schizoaffective disorder with past psychiatric hospitalizations, last documented admission at [facility] in February 2022 when he was discharged on:  Risperdal 2mg BID, Depakote DR 500mg AM, 750mg HS  Follow-up arranged with Dr. Blanchard at [facility]  However, patient has not been adherent with meds and clinic f/u

## 2023-01-22 NOTE — ED BEHAVIORAL HEALTH ASSESSMENT NOTE - REASON FOR REFERRAL
BIB EMS activated by mother on account of verbal aggression, auditory hallucinations in context of treatment noncompliance

## 2023-01-22 NOTE — ED BEHAVIORAL HEALTH ASSESSMENT NOTE - DESCRIPTION
Patient is cooperative although verbally aggressive and preoccupied with discharge. No known PMH Patient is domiciled with mother, currently employed Patient is cooperative with the interview  although verbally aggressive and preoccupied with discharge.   Vital Signs Last 24 Hrs  T(C): 36.8 (22 Jan 2023 20:06), Max: 36.8 (22 Jan 2023 20:06)  T(F): 98.2 (22 Jan 2023 20:06), Max: 98.2 (22 Jan 2023 20:06)  HR: 106 (22 Jan 2023 20:06) (106 - 106)  BP: 149/74 (22 Jan 2023 20:06) (149/74 - 149/74)  BP(mean): --  RR: 16 (22 Jan 2023 20:06) (16 - 16)  SpO2: 98% (22 Jan 2023 20:06) (98% - 98%)    Parameters below as of 22 Jan 2023 20:06  Patient On (Oxygen Delivery Method): room air

## 2023-01-22 NOTE — ED BEHAVIORAL HEALTH ASSESSMENT NOTE - RISK ASSESSMENT
Patient is exhibiting psychotic symptoms in context of poor treatment adherence. As suggested by collateral information, he poses risk of danger to self and others as evidenced by his recent purchase of knives and machete. Hence, he needs inpatient psychiatric admission as least restrictive environment for safety and psychiatric stabilization till safe discharge is possible.

## 2023-01-22 NOTE — ED ADULT TRIAGE NOTE - CHIEF COMPLAINT QUOTE
As per mom, pt. was being verbally aggressive and having auditory hallucinations. h/o schizophrenia, non-compliant with meds x few weeks. Denies SI/HI. Denies drug/alcohol use.

## 2023-01-23 DIAGNOSIS — F33.9 MAJOR DEPRESSIVE DISORDER, RECURRENT, UNSPECIFIED: ICD-10-CM

## 2023-01-23 LAB
B PERT DNA SPEC QL NAA+PROBE: SIGNIFICANT CHANGE UP
B PERT+PARAPERT DNA PNL SPEC NAA+PROBE: SIGNIFICANT CHANGE UP
BORDETELLA PARAPERTUSSIS (RAPRVP): SIGNIFICANT CHANGE UP
C PNEUM DNA SPEC QL NAA+PROBE: SIGNIFICANT CHANGE UP
COVID-19 SPIKE DOMAIN AB INTERP: POSITIVE
COVID-19 SPIKE DOMAIN ANTIBODY RESULT: >250 U/ML — HIGH
FLUAV SUBTYP SPEC NAA+PROBE: SIGNIFICANT CHANGE UP
FLUBV RNA SPEC QL NAA+PROBE: SIGNIFICANT CHANGE UP
HADV DNA SPEC QL NAA+PROBE: SIGNIFICANT CHANGE UP
HCOV 229E RNA SPEC QL NAA+PROBE: SIGNIFICANT CHANGE UP
HCOV HKU1 RNA SPEC QL NAA+PROBE: SIGNIFICANT CHANGE UP
HCOV NL63 RNA SPEC QL NAA+PROBE: SIGNIFICANT CHANGE UP
HCOV OC43 RNA SPEC QL NAA+PROBE: SIGNIFICANT CHANGE UP
HMPV RNA SPEC QL NAA+PROBE: SIGNIFICANT CHANGE UP
HPIV1 RNA SPEC QL NAA+PROBE: SIGNIFICANT CHANGE UP
HPIV2 RNA SPEC QL NAA+PROBE: SIGNIFICANT CHANGE UP
HPIV3 RNA SPEC QL NAA+PROBE: SIGNIFICANT CHANGE UP
HPIV4 RNA SPEC QL NAA+PROBE: SIGNIFICANT CHANGE UP
M PNEUMO DNA SPEC QL NAA+PROBE: SIGNIFICANT CHANGE UP
RAPID RVP RESULT: SIGNIFICANT CHANGE UP
RSV RNA SPEC QL NAA+PROBE: SIGNIFICANT CHANGE UP
RV+EV RNA SPEC QL NAA+PROBE: SIGNIFICANT CHANGE UP
SARS-COV-2 IGG+IGM SERPL QL IA: >250 U/ML — HIGH
SARS-COV-2 IGG+IGM SERPL QL IA: POSITIVE
SARS-COV-2 RNA SPEC QL NAA+PROBE: SIGNIFICANT CHANGE UP

## 2023-01-23 PROCEDURE — 99222 1ST HOSP IP/OBS MODERATE 55: CPT | Mod: GC

## 2023-01-23 RX ORDER — RISPERIDONE 4 MG/1
1 TABLET ORAL
Refills: 0 | Status: DISCONTINUED | OUTPATIENT
Start: 2023-01-23 | End: 2023-01-23

## 2023-01-23 RX ORDER — HALOPERIDOL DECANOATE 100 MG/ML
5 INJECTION INTRAMUSCULAR EVERY 6 HOURS
Refills: 0 | Status: DISCONTINUED | OUTPATIENT
Start: 2023-01-23 | End: 2023-02-15

## 2023-01-23 RX ORDER — DIPHENHYDRAMINE HCL 50 MG
50 CAPSULE ORAL EVERY 6 HOURS
Refills: 0 | Status: DISCONTINUED | OUTPATIENT
Start: 2023-01-23 | End: 2023-02-15

## 2023-01-23 RX ORDER — CLONAZEPAM 1 MG
0.5 TABLET ORAL
Refills: 0 | Status: DISCONTINUED | OUTPATIENT
Start: 2023-01-23 | End: 2023-01-26

## 2023-01-23 RX ORDER — HALOPERIDOL DECANOATE 100 MG/ML
5 INJECTION INTRAMUSCULAR ONCE
Refills: 0 | Status: DISCONTINUED | OUTPATIENT
Start: 2023-01-23 | End: 2023-02-15

## 2023-01-23 RX ORDER — PERPHENAZINE 8 MG/1
4 TABLET, FILM COATED ORAL AT BEDTIME
Refills: 0 | Status: DISCONTINUED | OUTPATIENT
Start: 2023-01-23 | End: 2023-01-24

## 2023-01-23 RX ORDER — DIPHENHYDRAMINE HCL 50 MG
50 CAPSULE ORAL ONCE
Refills: 0 | Status: DISCONTINUED | OUTPATIENT
Start: 2023-01-23 | End: 2023-02-15

## 2023-01-23 RX ADMIN — PERPHENAZINE 4 MILLIGRAM(S): 8 TABLET, FILM COATED ORAL at 21:42

## 2023-01-23 RX ADMIN — Medication 0.5 MILLIGRAM(S): at 21:42

## 2023-01-23 NOTE — BH PATIENT PROFILE - STATED REASON FOR ADMISSION
"There's two kids that live downstairs from me that are hacking into my phone and invading my privacy."

## 2023-01-23 NOTE — ED ADULT NURSE REASSESSMENT NOTE - NS ED NURSE REASSESS COMMENT FT1
Pt offers no complaints at this time, resp even unlabored, appears to be resting comfortably. Safety maintained.

## 2023-01-23 NOTE — BH INPATIENT PSYCHIATRY ASSESSMENT NOTE - RISK ASSESSMENT
Daily Note     Today's date: 10/11/2019  Patient name: Nisreen Oliver  : 1960  MRN: [id number]  Referring provider: Estefani Buchanan DO  Dx:   Encounter Diagnosis     ICD-10-CM    1  Acute pain of right shoulder M25 511    2  Chest wall pain R07 89                   Subjective:  Pt reports went to see orthopedist for further evaluation, she was concerned about osteophyte formation on anterior cervical spine and felt his symptoms could be coming from cervical spine  Pt reports he has also had more frequent dizziness with waking in the morning, also previous history of BPPV  Pt presents with new prescription for PT for cervical spine  Pt also reports reduced intensity and frequency of right axillary symptoms, feels PT has been helping with this  Precautions: myotonic dystrophy, DM, CAD, CABG, h/o DVT    Objective: See treatment diary  +Kimberly Hallpike on L with 4 beats of upward torsion, negative on R  Second position 4/10  Epley 4/10, head-spinning  With completion of 010  FGA: dizziness with side-side and up-down and decreased gait speed, backward 2/3    Cervical ROM: flexion/extension WNL, R rot = 50, L Rot = 52,  R side-bending = 18,   L side-bending = 22    Assessment: Pt with positive Kimberly Hallpike on left, good response to Epley  Pt with moderate deficits in cervical range of motion, with some limitation due to onset of dizziness  Pt's right axillary symptoms not reproduced by cervical ROM or special testing, appears to be non-contributory to his symptoms  Pt continues with good response to manual techniques for right shoulder with improved range of motion and decreased symptoms  Will resume full treatment for shoulder, reassess Lucas Flores and add in cervical stretching at next session  Plan: Continue per plan of care  Progress treatment as tolerated         See media at D/C for exercise diary
Patient is exhibiting psychotic symptoms in context of poor treatment adherence. As suggested by collateral information, he poses risk of danger to self and others as evidenced by his recent purchase of knives and machete. Hence, he needs inpatient psychiatric admission as least restrictive environment for safety and psychiatric stabilization till safe discharge is possible.

## 2023-01-23 NOTE — BH INPATIENT PSYCHIATRY ASSESSMENT NOTE - NSBHCRANIAL_PSY_ALL_CORE
Recognizes 2 fingers or can read (II)/Smiles, shows teeth, opens mouth, sticks out tongue (V, VII, XI)

## 2023-01-23 NOTE — BH INPATIENT PSYCHIATRY ASSESSMENT NOTE - NSDCCRITERIA_PSY_ALL_CORE
improvement in symptoms improvement in psychotic symptoms and stable for discharge with a CGI Improvement score =2

## 2023-01-23 NOTE — BH INPATIENT PSYCHIATRY ASSESSMENT NOTE - OTHER PAST PSYCHIATRIC HISTORY (INCLUDE DETAILS REGARDING ONSET, COURSE OF ILLNESS, INPATIENT/OUTPATIENT TREATMENT)
Patient has been diagnosed with schizoaffective disorder with past psychiatric hospitalizations, last documented admission at [facility] in February 2022 when he was discharged on:  Risperdal 2mg BID, Depakote DR 500mg AM, 750mg HS  Follow-up arranged with Dr. Blanchard at [facility]  However, patient has not been adherent with meds and clinic f/u

## 2023-01-23 NOTE — BH INPATIENT PSYCHIATRY ASSESSMENT NOTE - CURRENT MEDICATION
MEDICATIONS  (STANDING):  clonazePAM  Tablet 0.5 milliGRAM(s) Oral two times a day  perphenazine 4 milliGRAM(s) Oral at bedtime    MEDICATIONS  (PRN):  diphenhydrAMINE 50 milliGRAM(s) Oral every 6 hours PRN EPS  diphenhydrAMINE Injectable 50 milliGRAM(s) IntraMuscular once PRN EPS  haloperidol     Tablet 5 milliGRAM(s) Oral every 6 hours PRN agitation  haloperidol    Injectable 5 milliGRAM(s) IntraMuscular once PRN severe agitation secondary to psychosis  LORazepam     Tablet 2 milliGRAM(s) Oral every 6 hours PRN Agitation  LORazepam     Tablet 2 milliGRAM(s) Oral every 6 hours PRN Agitation  LORazepam   Injectable 2 milliGRAM(s) IntraMuscular once PRN severe agitation secondary to psychosis

## 2023-01-23 NOTE — ED BEHAVIORAL HEALTH NOTE - BEHAVIORAL HEALTH NOTE
Dr. Verdugo mentioned that collateral on the patient is required. The patient's mother, Gerri Arias, at [phone], was contacted by a . Mother stated that she had to conceal every knife and scissor in the house, but the patient went out and purchased more. She found a machete in her home that she had to hide. According to her mother, the patient was constantly complaining about the noisy neighbor's child downstairs. Mother stated unequivocally that no child lives downstairs. The patient complained to his mother about his next-door neighbors bothering him. When she was waiting for the ambulance to  the patient, she realized that the next-door neighbor wasn't even home. Before she called the police, the patient went outside with a knife to hurt the people who were bothering him but no one was there, so he returned to the room and placed the knife in the drawer. He tried to force his way into his mother's room, telling her he was there to save her and  hitting her with the door as he did so. Mother expressed her fear of him now that he has changed. She must sleep with the keys in the door because she is unsure what he is capable of doing. She stated that she lives with her son at [address] [place] [address], [facility]. Mother describes being diagnosed with schizophrenia in 1995 and using medication to manage it. The patient was diagnosed with schizophrenia at [facility] in September 2022, according to his mother, and he stopped taking his medication and has not seen any mental health providers. Mother denied knowledge of any prior history of suicidal ideation, self-injurious behaviors, or substance abuse. No legal involvement; the patient stopped working 3 weeks ago. Mother stated that she would not take him home until he no longer has auditory hallucinations and is stable.

## 2023-01-23 NOTE — BH INPATIENT PSYCHIATRY ASSESSMENT NOTE - NSBHASSESSSUMMFT_PSY_ALL_CORE
Patient is a 23 yo single  male domiciled with mother in [place], employed with [employer], with documented history of mental illness (schizoaffective disorder, bipolar type, cannabis and alcohol use disorder) and unclear PMH who was bibems activated by mother on account of worsening verbal aggression and auditory hallucinations in context of treatment noncompliance.    On initial assessment, patient presents w/ symptoms of psychosis, including prominent and pervasive paranoid/persecutory delusions related to his neighbors, co-workers, and mother in the setting of treatment non-adherence. Paranoia accompanied by agitation, aggression (per collateral provider), violent ideation, and A/H. Patient denies S/I and H/I. Patient's severe paranoia, limited insight, and poor judgment render him an acute risk to himself and others. He would benefit from inpatient hospitalization for safety, stabilization, and medication optimization. Routine observation appropriate. Will start perphenazine 4mg qhs -- a more weight-neutral agent -- due to obese BMI. Of note, patient appears to have gynecomastia (w/ documented history of successful risperidone trial; despite success, would not initiate this agent at this time). Will add Klonopin 0.5mg BID for psychotic anxiety. Due to patient's stated disinterest in psychotropic medications will hold off on re-starting Depakote, which, per chart, patient had been successful on during previous admission.     PLAN  - Admit on 9.27  - Routine observation appropriate  - Perphenazine 4mg qhs for psychosis  - Klonopin 0.5mg BID for psychotic anxiety  - No acute medical concerns  - Individual, group, milieu therapy as appropriate  - Obtain collateral information (note: patient has not given treatment team consent to speak w/ mother)  - Dispo: when stable Patient is a 21 yo single  male domiciled with mother in [place], employed with [employer], with documented history of mental illness (schizoaffective disorder, bipolar type, cannabis and alcohol use disorder) and unclear PMH who was bib ems activated by mother on account of worsening verbal aggression and auditory hallucinations in context of treatment noncompliance.    On initial assessment, patient presents w/ symptoms of psychosis, including prominent and pervasive paranoid/persecutory delusions related to his neighbors, co-workers, and mother in the setting of treatment non-adherence. Paranoia accompanied by agitation, aggression (per collateral provider), violent ideation, and A/H. Patient denies S/I and H/I. Patient's severe paranoia, limited insight, and poor judgment render him an acute risk to himself and others. He would benefit from inpatient hospitalization for safety, stabilization, and medication optimization. Routine observation appropriate. Will start perphenazine 4mg qhs -- a more weight-neutral agent -- due to obese BMI. Of note, patient appears to have gynecomastia (w/ documented history of successful risperidone trial; despite success, would not initiate this agent at this time as patient reports feeling "feminized"). Will add Klonopin 0.5mg BID for psychotic anxiety. Due to patient's stated disinterest in psychotropic medications will hold off on re-starting Depakote, which, per chart, patient had been successful on during previous admission.     PLAN  - Admit on 9.27  - Routine observation appropriate  - Perphenazine 4mg qhs for psychosis (utilizing this FGA for its favorable side effect profile)  - Clonazepam 0.5mg BID for psychotic anxiety  - No acute medical concerns  - Individual, group, milieu therapy as appropriate  - Obtain collateral information (note: patient has not given treatment team consent to speak w/ mother)  - Dispo: when stable

## 2023-01-23 NOTE — BH PATIENT PROFILE - HOME MEDICATIONS
4 risperiDONE 2 mg oral tablet , 1 tab(s) orally in the morning and at bedtime  divalproex sodium 500 mg oral delayed release tablet , 1 tab(s) orally in the morning and at bedtime   divalproex sodium 250 mg oral delayed release tablet , 1 tab(s) orally once a day (at bedtime)

## 2023-01-23 NOTE — BH PATIENT PROFILE - FALL HARM RISK - UNIVERSAL INTERVENTIONS
None
Non-slip footwear when patient is out of bed/Physically safe environment - no spills, clutter or unnecessary equipment/Purposeful Proactive Rounding/Room/bathroom lighting operational, light cord in reach

## 2023-01-23 NOTE — ED BEHAVIORAL HEALTH NOTE - BEHAVIORAL HEALTH NOTE
Pt seen and chart reviewed. Pt was asleep on encounter and unable to interview.     Pt looked comfortable, vital signs stable. No acute events overnight. Pt received Haldol 5mg and Ativan 2mg IM at 21:14 with good effect.     Vital Signs:     T(C): 36.6 (01-23-23 @ 09:27)  T(F): 97.8 (01-23-23 @ 09:27), Max: 98.2 (01-22-23 @ 20:06)  HR: 79 (01-23-23 @ 09:27) (79 - 106)  BP: 127/60 (01-23-23 @ 09:27) (127/60 - 149/74)  RR:  (16 - 18)  SpO2:  (97% - 100%)  Wt(kg): --    Plan: Patient is a 21 yo single  male domiciled with mother in [place], employed with [employer], with documented history of mental illness (schizoaffective disorder, bipolar type, cannabis and alcohol use disorder) and unclear PMH who was bibems activated by mother on account of worsening verbal aggression and auditory hallucinations in context of treatment noncompliance. Upon presentation, patient was acutely psychotic, recently purchased knives/machete and possible danger to self/others. PT requires inpatient hospitalization for safety and stabilization. Admit patient on 9.27, no beds available at this time at [facility], but will continue bed search. Pt seen and chart reviewed. Pt was asleep on encounter and unable to interview.     Pt looked comfortable, vital signs stable. No acute events overnight. Pt received Haldol 5mg and Ativan 2mg IM at 21:14 with good effect.     Vital Signs:     T(C): 36.6 (01-23-23 @ 09:27)  T(F): 97.8 (01-23-23 @ 09:27), Max: 98.2 (01-22-23 @ 20:06)  HR: 79 (01-23-23 @ 09:27) (79 - 106)  BP: 127/60 (01-23-23 @ 09:27) (127/60 - 149/74)  RR:  (16 - 18)  SpO2:  (97% - 100%)  Wt(kg): --    Plan: Patient is a 21 yo single  male domiciled with mother in [place], employed with [employer], with documented history of mental illness (schizoaffective disorder, bipolar type, cannabis and alcohol use disorder) and unclear PMH who was bibems activated by mother on account of worsening verbal aggression and auditory hallucinations in context of treatment noncompliance. Upon presentation, patient was acutely psychotic, recently purchased knives/machete and possible danger to self/others. PT requires inpatient hospitalization for safety and stabilization. Admit patient on 9.27, no beds available at this time at [facility], but will continue bed search.    Update: 1/23/2023 at 10:43 AM: Bed available on 1N at [facility]. Patient will be transferred to [facility] 1N. I told the ED attending about the transfer and provided handoff to Dr. Mendelowitz on a secure voicemail.

## 2023-01-23 NOTE — BH INPATIENT PSYCHIATRY ASSESSMENT NOTE - NSBHCHARTREVIEWVS_PSY_A_CORE FT
Vital Signs Last 24 Hrs  T(C): 35.8 (01-23-23 @ 15:12), Max: 36.8 (01-22-23 @ 20:06)  T(F): 96.4 (01-23-23 @ 15:12), Max: 98.2 (01-22-23 @ 20:06)  HR: 65 (01-23-23 @ 13:34) (65 - 106)  BP: 125/68 (01-23-23 @ 13:34) (125/68 - 149/74)  BP(mean): --  RR: 18 (01-23-23 @ 13:34) (16 - 18)  SpO2: 99% (01-23-23 @ 13:34) (97% - 100%)    Orthostatic VS  01-23-23 @ 15:12  Lying BP: --/-- HR: --  Sitting BP: 123/81 HR: 90  Standing BP: 134/88 HR: 104  Site: --  Mode: --   Vital Signs Last 24 Hrs  T(C): 35.8 (01-23-23 @ 15:12), Max: 36.8 (01-23-23 @ 13:34)  T(F): 96.4 (01-23-23 @ 15:12), Max: 98.2 (01-23-23 @ 13:34)  HR: 65 (01-23-23 @ 13:34) (65 - 79)  BP: 125/68 (01-23-23 @ 13:34) (125/68 - 127/60)  BP(mean): --  RR: 18 (01-23-23 @ 13:34) (18 - 18)  SpO2: 99% (01-23-23 @ 13:34) (99% - 100%)    Orthostatic VS  01-23-23 @ 15:12  Lying BP: --/-- HR: --  Sitting BP: 123/81 HR: 90  Standing BP: 134/88 HR: 104  Site: --  Mode: --

## 2023-01-23 NOTE — BH INPATIENT PSYCHIATRY ASSESSMENT NOTE - MSE UNSTRUCTURED FT
MSE:  GHAZALA/BEH: Adult male in no acute distress; dressed in hospital gown; somewhat agitated although cooperative; fair eye contact.   SPEECH: Normal rate, tone, and volume.   MOTOR: No PMR/PMA; no other abnormal movements.   MOOD: “don't poke the bear"  AFFECT: irritable, constricted, congruent  THOUGHT PROCESS: occasional loosening of associations.   THOUGHT CONTENT: Denies SI/HI; +VI +paranoid persecutory delusions   PERCEPTIONS: +AH by history. Does not appear internally preoccupied.   COGNITION: Grossly intact.   INSIGHT: poor  JUDGMENT: poor  IMPULSE CONTROL: tenuous

## 2023-01-23 NOTE — ED BEHAVIORAL HEALTH NOTE - BEHAVIORAL HEALTH NOTE
Writer called pt's mother  to inform her patient will be transferred to [facility], however voicemail is full unable to leave a message.

## 2023-01-23 NOTE — BH INPATIENT PSYCHIATRY ASSESSMENT NOTE - NSBHMETABOLIC_PSY_ALL_CORE_FT
BMI: BMI (kg/m2): 31.8 (01-23-23 @ 15:12)  HbA1c: A1C with Estimated Average Glucose Result: 5.9 % (02-11-22 @ 09:15)    Glucose:   BP: 125/68 (01-23-23 @ 13:34) (125/68 - 149/74)  Lipid Panel: Date/Time: 02-11-22 @ 09:15  Cholesterol, Serum: 267  Direct LDL: --  HDL Cholesterol, Serum: 48  Total Cholesterol/HDL Ration Measurement: --  Triglycerides, Serum: 162

## 2023-01-23 NOTE — BH INPATIENT PSYCHIATRY ASSESSMENT NOTE - HPI (INCLUDE ILLNESS QUALITY, SEVERITY, DURATION, TIMING, CONTEXT, MODIFYING FACTORS, ASSOCIATED SIGNS AND SYMPTOMS)
Per ED  Assessment Note:     "Patient is a 23 yo single  male domiciled with mother in [place], employed with [employer], with documented history of mental illness (schizoaffective disorder, bipolar type, cannabis and alcohol use disorder) and unclear PMH who was bibems activated by mother on account of worsening verbal aggression and auditory hallucinations in context of treatment noncompliance.    Patient was given IM medication upon arrival due to agitation and would not cooperate. On evaluation, patient states his neighbors downstairs have been invading his privacy, making noise to get under his skin and have been using an eve to access his phone screen. He also reports belief that his mother and her "everyone" hate him as a group. He says last week at work, "the entire facility was trying to alienate me." He states he has left everyone alone, his family and friends, "trying to make new connections." Patient presenting with persecutory and paranoid delusions although he denies auditory, visual and other forms of hallucinations. He says his mood has been "wonderful," although denies increased energy, racing thoughts, reduced need for sleep and other history suggestive of cristine at this time. He states he has an upcoming test to enlist in the [employer] and he denies suicidal and homicidal ideations, intent, or plan.     Per collateral obtained from mother: patient has internally preoccupied, hearing voices, has not been sleeping, increasingly paranoid to the point of buying knives, machete and making threats. He has not been in psychiatric treatment since his last psychiatric admission. Mother is concerned patient is "going to hurt someone.""    On initial assessment, patient presents as cooperative, although somewhat agitated and very paranoid. Patient's associations loosen at times and he can be difficult to follow. Patient reports he activated EMS himself due to concern that he might hurt people that he believes are targeting him. Patient says downstairs neighbors are conspiring against him, although he struggles to provide details as to how or why. Believes they are frequently talking about him, hacking his phone (or using applications to track his activity), and that they might hurt him in the future. He says he can frequently hear these neighbors talking about him or saying his ex-girlfriend's name out loud. Patient believes his mother may be in on some sort of plan against him. However, he also believes mother may be a target of some sort of larger conspiracy. He does not know who might be targeting mother. Does not know why he or his mother may be targets. Patient describes recent paranoia at job at [employer]. He reports walking into work and everyone telling him he was a . Of note, he refers to various neighborhoods by their precinct numbers. He denies S/I/I/P. Although he is concerned he might hurt neighbors, or others, if they "poke the bear," he denies homicidal intent/plan. He say he is "not that gruesome." Denies recent substance use. Not currently in psychiatric treatment. Reports 4 past hospitalizations, including 2 at [facility] (most recently L4 in 2022) and 2 and [facility]. Not interested in psychotropic medications at this time; says they made him feel "girly" and made him want to cry.  Per ED  Assessment Note:   "Patient is a 23 yo single  male domiciled with mother in [place], employed with [employer], with documented history of mental illness (schizoaffective disorder, bipolar type, cannabis and alcohol use disorder) and unclear PMH who was bibems activated by mother on account of worsening verbal aggression and auditory hallucinations in context of treatment noncompliance.    Patient was given IM medication upon arrival due to agitation and would not cooperate. On evaluation, patient states his neighbors downstairs have been invading his privacy, making noise to get under his skin and have been using an eve to access his phone screen. He also reports belief that his mother and her "everyone" hate him as a group. He says last week at work, "the entire facility was trying to alienate me." He states he has left everyone alone, his family and friends, "trying to make new connections." Patient presenting with persecutory and paranoid delusions although he denies auditory, visual and other forms of hallucinations. He says his mood has been "wonderful," although denies increased energy, racing thoughts, reduced need for sleep and other history suggestive of cristine at this time. He states he has an upcoming test to enlist in the [employer] and he denies suicidal and homicidal ideations, intent, or plan.     Per collateral obtained from mother: patient has internally preoccupied, hearing voices, has not been sleeping, increasingly paranoid to the point of buying knives, machete and making threats. He has not been in psychiatric treatment since his last psychiatric admission. Mother is concerned patient is "going to hurt someone.""    On initial assessment, patient presents as cooperative, although somewhat agitated and very paranoid. Patient's associations loosen at times and he can be difficult to follow. Patient reports he activated EMS himself due to concern that he might hurt people that he believes are targeting him. Patient says downstairs neighbors are conspiring against him, although he struggles to provide details as to how or why. Believes they are frequently talking about him, hacking his phone (or using applications to track his activity), and that they might hurt him in the future. He says he can frequently hear these neighbors talking about him or saying his ex-girlfriend's name out loud. Patient believes his mother may be in on some sort of plan against him. However, he also believes mother may be a target of some sort of larger conspiracy. He does not know who might be targeting mother. Does not know why he or his mother may be targets. Patient describes recent paranoia at job at [employer]. He reports walking into work and everyone telling him he was a . Of note, he refers to various neighborhoods by their precinct numbers. He denies S/I/I/P. Although he is concerned he might hurt neighbors, or others, if they "poke the bear," he denies homicidal intent/plan. He say he is "not that gruesome." Denies recent substance use. Not currently in psychiatric treatment. Reports 4 past hospitalizations, including 2 at [facility] (most recently L4 in 2022) and 2 and [facility]. Not interested in psychotropic medications at this time; says they made him feel "girly" and made him want to cry.

## 2023-01-23 NOTE — BH INPATIENT PSYCHIATRY ASSESSMENT NOTE - NSBHATTESTCOMMENTATTENDFT_PSY_A_CORE
23 yo single  male domiciled with mother in [place], employed with [employer], with documented history of mental illness (schizoaffective disorder, bipolar type, cannabis and alcohol use disorder) and unclear PMH who was bib ems activated by mother on account of worsening verbal aggression and auditory hallucinations in context of treatment noncompliance.     Patient with severe paranoia on exam that is all encompassing and was involved in every answer to every question  This is accompanied by a complete lack of any insight into his symptoms or illness or need for meds  There is potential for aggression with his "don't poke the bear" comments  Reports feeling "feminized" by combination of risperidone and divalproex  Will begin FGA trial with perphenazine for is favorable tolerability profile along with clonazepam for psychotic anxiety

## 2023-01-23 NOTE — BH INPATIENT PSYCHIATRY ASSESSMENT NOTE - NSICDXBHTERTIARYDX_PSY_ALL_CORE
R/O Delusional disorder, persecutory type   F22  R/O Schizoaffective disorder, bipolar type   F25.0

## 2023-01-23 NOTE — ED ADULT NURSE REASSESSMENT NOTE - NS ED NURSE REASSESS COMMENT FT1
Report received from previous shift RN, pt is aox4, tolerated PO meal this morning. Calm and cooperative, safety measures maintained. Pending bed assignment

## 2023-01-24 LAB
A1C WITH ESTIMATED AVERAGE GLUCOSE RESULT: 5.8 % — HIGH (ref 4–5.6)
BASOPHILS # BLD AUTO: 0.04 K/UL — SIGNIFICANT CHANGE UP (ref 0–0.2)
BASOPHILS NFR BLD AUTO: 0.4 % — SIGNIFICANT CHANGE UP (ref 0–2)
CHOLEST SERPL-MCNC: 224 MG/DL — HIGH
COVID-19 SPIKE DOMAIN AB INTERP: POSITIVE
COVID-19 SPIKE DOMAIN ANTIBODY RESULT: >250 U/ML — HIGH
EOSINOPHIL # BLD AUTO: 0.26 K/UL — SIGNIFICANT CHANGE UP (ref 0–0.5)
EOSINOPHIL NFR BLD AUTO: 2.7 % — SIGNIFICANT CHANGE UP (ref 0–6)
ESTIMATED AVERAGE GLUCOSE: 120 — SIGNIFICANT CHANGE UP
HCT VFR BLD CALC: 45.4 % — SIGNIFICANT CHANGE UP (ref 39–50)
HDLC SERPL-MCNC: 38 MG/DL — LOW
HGB BLD-MCNC: 13.7 G/DL — SIGNIFICANT CHANGE UP (ref 13–17)
IANC: 5.12 K/UL — SIGNIFICANT CHANGE UP (ref 1.8–7.4)
IMM GRANULOCYTES NFR BLD AUTO: 0.4 % — SIGNIFICANT CHANGE UP (ref 0–0.9)
LIPID PNL WITH DIRECT LDL SERPL: 164 MG/DL — HIGH
LYMPHOCYTES # BLD AUTO: 3.94 K/UL — HIGH (ref 1–3.3)
LYMPHOCYTES # BLD AUTO: 40.2 % — SIGNIFICANT CHANGE UP (ref 13–44)
MCHC RBC-ENTMCNC: 22.2 PG — LOW (ref 27–34)
MCHC RBC-ENTMCNC: 30.2 GM/DL — LOW (ref 32–36)
MCV RBC AUTO: 73.5 FL — LOW (ref 80–100)
MONOCYTES # BLD AUTO: 0.41 K/UL — SIGNIFICANT CHANGE UP (ref 0–0.9)
MONOCYTES NFR BLD AUTO: 4.2 % — SIGNIFICANT CHANGE UP (ref 2–14)
NEUTROPHILS # BLD AUTO: 5.12 K/UL — SIGNIFICANT CHANGE UP (ref 1.8–7.4)
NEUTROPHILS NFR BLD AUTO: 52.1 % — SIGNIFICANT CHANGE UP (ref 43–77)
NON HDL CHOLESTEROL: 186 MG/DL — HIGH
NRBC # BLD: 0 /100 WBCS — SIGNIFICANT CHANGE UP (ref 0–0)
NRBC # FLD: 0 K/UL — SIGNIFICANT CHANGE UP (ref 0–0)
PLATELET # BLD AUTO: 361 K/UL — SIGNIFICANT CHANGE UP (ref 150–400)
RBC # BLD: 6.18 M/UL — HIGH (ref 4.2–5.8)
RBC # FLD: 17.3 % — HIGH (ref 10.3–14.5)
SARS-COV-2 IGG+IGM SERPL QL IA: >250 U/ML — HIGH
SARS-COV-2 IGG+IGM SERPL QL IA: POSITIVE
TRIGL SERPL-MCNC: 109 MG/DL — SIGNIFICANT CHANGE UP
WBC # BLD: 9.81 K/UL — SIGNIFICANT CHANGE UP (ref 3.8–10.5)
WBC # FLD AUTO: 9.81 K/UL — SIGNIFICANT CHANGE UP (ref 3.8–10.5)

## 2023-01-24 PROCEDURE — 99232 SBSQ HOSP IP/OBS MODERATE 35: CPT | Mod: GC

## 2023-01-24 RX ORDER — PERPHENAZINE 8 MG/1
8 TABLET, FILM COATED ORAL AT BEDTIME
Refills: 0 | Status: DISCONTINUED | OUTPATIENT
Start: 2023-01-24 | End: 2023-01-25

## 2023-01-24 RX ADMIN — Medication 50 MILLIGRAM(S): at 20:36

## 2023-01-24 RX ADMIN — Medication 0.5 MILLIGRAM(S): at 20:19

## 2023-01-24 RX ADMIN — Medication 0.5 MILLIGRAM(S): at 08:10

## 2023-01-24 RX ADMIN — PERPHENAZINE 8 MILLIGRAM(S): 8 TABLET, FILM COATED ORAL at 20:19

## 2023-01-24 NOTE — BH INPATIENT PSYCHIATRY PROGRESS NOTE - CURRENT MEDICATION
MEDICATIONS  (STANDING):  clonazePAM  Tablet 0.5 milliGRAM(s) Oral two times a day  perphenazine 8 milliGRAM(s) Oral at bedtime    MEDICATIONS  (PRN):  diphenhydrAMINE 50 milliGRAM(s) Oral every 6 hours PRN EPS  diphenhydrAMINE Injectable 50 milliGRAM(s) IntraMuscular once PRN EPS  haloperidol     Tablet 5 milliGRAM(s) Oral every 6 hours PRN agitation  haloperidol    Injectable 5 milliGRAM(s) IntraMuscular once PRN severe agitation secondary to psychosis  LORazepam     Tablet 2 milliGRAM(s) Oral every 6 hours PRN Agitation  LORazepam     Tablet 2 milliGRAM(s) Oral every 6 hours PRN Agitation  LORazepam   Injectable 2 milliGRAM(s) IntraMuscular once PRN severe agitation secondary to psychosis

## 2023-01-24 NOTE — BH INPATIENT PSYCHIATRY PROGRESS NOTE - NSBHFUPINTERVALHXFT_PSY_A_CORE
Chart reviewed and case d/w interdisciplinary team. Adherent w/ meds, in fair behavioral control, good sleep overnight. Patient reports fair mood. Feels better and calmer after getting a good night's rest. Patient continues to feel neighbors are targeting him and others may be targeting mom -- however is uncertain why. Says no one has come to his mother's defense but doesn't elaborate. Believes neighbors, mom, coworkers are all connected in some way. Reports he believes he has a "heightened awareness" of the things that are occurring around him. Believes he made right decision to come to hospital. Says he needs to isolate himself from outside sources of stress. Feels safe on the unit. Describes life goals and speaks somewhat incoherently about his mental health; suggests he might not take medication in the future. Psychoeducation provided on importance of medication adherence. No medication side effects. No S/I, V/I, or H/I on the unit.

## 2023-01-24 NOTE — BH INPATIENT PSYCHIATRY PROGRESS NOTE - MSE UNSTRUCTURED FT
MSE:  GHAZALA/BEH: Adult male in no acute distress; dressed in hospital gown; somewhat agitated although cooperative; fair eye contact.   SPEECH: talkative  MOTOR: No PMR/PMA; no other abnormal movements.   MOOD: “fine"  AFFECT: neutral, animated at times,   THOUGHT PROCESS: occasional derailments   THOUGHT CONTENT: Denies SI/HI; +paranoid persecutory delusions   PERCEPTIONS: +AH by history. Does not appear internally preoccupied.   COGNITION: Grossly intact.   INSIGHT: poor  JUDGMENT: poor  IMPULSE CONTROL: tenuous

## 2023-01-24 NOTE — BH INPATIENT PSYCHIATRY PROGRESS NOTE - NSBHASSESSSUMMFT_PSY_ALL_CORE
Patient is a 23 yo single  male domiciled with mother in [place], employed with [employer], with documented history of mental illness (schizoaffective disorder, bipolar type, cannabis and alcohol use disorder) and unclear PMH who was bib ems activated by mother on account of worsening verbal aggression and auditory hallucinations in context of treatment noncompliance.    On initial assessment, patient presents w/ symptoms of psychosis, including prominent and pervasive paranoid/persecutory delusions related to his neighbors, co-workers, and mother in the setting of treatment non-adherence. Paranoia accompanied by agitation, aggression (per collateral provider), violent ideation, and A/H. Patient denies S/I and H/I. Patient's severe paranoia, limited insight, and poor judgment render him an acute risk to himself and others. He would benefit from inpatient hospitalization for safety, stabilization, and medication optimization. Routine observation appropriate. Will start perphenazine 4mg qhs -- a more weight-neutral agent -- due to obese BMI. Of note, patient appears to have gynecomastia (w/ documented history of successful risperidone trial; despite success, would not initiate this agent at this time as patient reports feeling "feminized"). Will add Klonopin 0.5mg BID for psychotic anxiety. Due to patient's stated disinterest in psychotropic medications will hold off on re-starting Depakote, which, per chart, patient had been successful on during previous admission.     Today, patient presents as calmer, less agitated, and overall is cooperative. Continues to describe pervasive yet vague delusional system involving neighbors, mom, coworkers. Adherent w/ meds overnight, however, suggests he might not take meds in future. Psychoeducation provided on importance of medication adherence. In good behavioral control. Will increase perphenazine to 8mg qhs.     PLAN  - Admit on 9.27  - Routine observation appropriate  - Increase perphenazine to 8mg qhs for psychosis (utilizing this FGA for its favorable side effect profile)  - Clonazepam 0.5mg BID for psychotic anxiety  - No acute medical concerns  - Individual, group, milieu therapy as appropriate  - Obtain collateral information (note: patient has not given treatment team consent to speak w/ mother)  - Dispo: when stable Patient is a 21 yo single  male domiciled with mother in [place], employed with [employer], with documented history of mental illness (schizoaffective disorder, bipolar type, cannabis and alcohol use disorder) and unclear PMH who was bib ems activated by mother on account of worsening verbal aggression and auditory hallucinations in context of treatment noncompliance.    On initial assessment, patient presents w/ symptoms of psychosis, including prominent and pervasive paranoid/persecutory delusions related to his neighbors, co-workers, and mother in the setting of treatment non-adherence. Paranoia accompanied by agitation, aggression (per collateral provider), violent ideation, and A/H. Patient denies S/I and H/I. Patient's severe paranoia, limited insight, and poor judgment render him an acute risk to himself and others. He would benefit from inpatient hospitalization for safety, stabilization, and medication optimization. Routine observation appropriate. Will start perphenazine 4mg qhs -- a more weight-neutral agent -- due to obese BMI. Of note, patient appears to have gynecomastia (w/ documented history of successful risperidone trial; despite success, would not initiate this agent at this time as patient reports feeling "feminized"). Will add Klonopin 0.5mg BID for psychotic anxiety. Due to patient's stated disinterest in psychotropic medications will hold off on re-starting Depakote, which, per chart, patient had been successful on during previous admission.     Today, patient presents as calmer, less agitated, and overall is more cooperative. Continues to describe pervasive yet vague delusional system involving neighbors, mom, coworkers. Adherent w/ meds overnight, however, suggests he might not take meds in future. Psychoeducation provided on importance of medication adherence. In good behavioral control. Will increase perphenazine to 8mg qhs.     PLAN  - Admit on 9.27  - Routine observation appropriate  - Increase perphenazine to 8mg qhs for psychosis (utilizing this FGA for its favorable side effect profile)  - Clonazepam 0.5mg BID for psychotic anxiety  - No acute medical concerns  - Individual, group, milieu therapy as appropriate  - Obtain collateral information (note: patient has not given treatment team consent to speak w/ mother)  - Dispo: when stable

## 2023-01-24 NOTE — BH INPATIENT PSYCHIATRY PROGRESS NOTE - PRN MEDS
MEDICATIONS  (PRN):  diphenhydrAMINE 50 milliGRAM(s) Oral every 6 hours PRN EPS  diphenhydrAMINE Injectable 50 milliGRAM(s) IntraMuscular once PRN EPS  haloperidol     Tablet 5 milliGRAM(s) Oral every 6 hours PRN agitation  haloperidol    Injectable 5 milliGRAM(s) IntraMuscular once PRN severe agitation secondary to psychosis  LORazepam     Tablet 2 milliGRAM(s) Oral every 6 hours PRN Agitation  LORazepam     Tablet 2 milliGRAM(s) Oral every 6 hours PRN Agitation  LORazepam   Injectable 2 milliGRAM(s) IntraMuscular once PRN severe agitation secondary to psychosis

## 2023-01-24 NOTE — PSYCHIATRIC REHAB INITIAL EVALUATION - NSBHALCSUBTREAT_PSY_ALL_CORE
As per chart, pt has 4 past hospitalizations, including 2 at [facility] (most recently L4 in 2022) and 2 and [facility], follow-up arranged with Dr. Blanchard at [facility]. However, patient has not been adherent.

## 2023-01-24 NOTE — BH TREATMENT PLAN - NSTXPSYCHOINTERRN_PSY_ALL_CORE
Assess for signs and symptoms psychosis, redirect and reorient as necessary, provide support, maintain safety, explore healthy coping skills, identify triggers, encourage pt to participate in groups and unit activities, administer and educate on ordered medications

## 2023-01-24 NOTE — BH INPATIENT PSYCHIATRY PROGRESS NOTE - NSBHMETABOLIC_PSY_ALL_CORE_FT
BMI: BMI (kg/m2): 31.8 (01-23-23 @ 15:12)  HbA1c: A1C with Estimated Average Glucose Result: 5.8 % (01-24-23 @ 08:00)    Glucose:   BP: 125/68 (01-23-23 @ 13:34) (125/68 - 149/74)  Lipid Panel: Date/Time: 01-24-23 @ 08:00  Cholesterol, Serum: 224  Direct LDL: --  HDL Cholesterol, Serum: 38  Total Cholesterol/HDL Ration Measurement: --  Triglycerides, Serum: 109

## 2023-01-24 NOTE — BH SOCIAL WORK INITIAL PSYCHOSOCIAL EVALUATION - OTHER PAST PSYCHIATRIC HISTORY (INCLUDE DETAILS REGARDING ONSET, COURSE OF ILLNESS, INPATIENT/OUTPATIENT TREATMENT)
Pt. is a 23 y/o AA male domiciled with mother with history of Schizoaffective d/o, bipolar type, cannabis and alcohol use d/o with four prior psych. hospitalizations the most recent Feb. 2022.  Pt. was bib EMS due to internal preoccupation, worsening AH and verbal aggression, increasingly paranoid to the point that pt. was buying knifes, machete and making verbal threats in the context of treatment and med non-compliance.  Pt. has stated that the downstairs neighbors are conspiring against him, although pt. struggles to provide details at to how or why.  Believes his phone is being hacked and using applications to track his activity.  Upon discharge from last hospitalization, follow-up was arranged with Dr. Blanchard at [facility] although pt has not been adherent.

## 2023-01-25 PROCEDURE — 90853 GROUP PSYCHOTHERAPY: CPT

## 2023-01-25 PROCEDURE — 99232 SBSQ HOSP IP/OBS MODERATE 35: CPT

## 2023-01-25 RX ORDER — PERPHENAZINE 8 MG/1
12 TABLET, FILM COATED ORAL AT BEDTIME
Refills: 0 | Status: DISCONTINUED | OUTPATIENT
Start: 2023-01-25 | End: 2023-01-26

## 2023-01-25 RX ADMIN — HALOPERIDOL DECANOATE 5 MILLIGRAM(S): 100 INJECTION INTRAMUSCULAR at 20:10

## 2023-01-25 RX ADMIN — Medication 50 MILLIGRAM(S): at 20:10

## 2023-01-25 RX ADMIN — PERPHENAZINE 12 MILLIGRAM(S): 8 TABLET, FILM COATED ORAL at 20:10

## 2023-01-25 RX ADMIN — Medication 0.5 MILLIGRAM(S): at 08:53

## 2023-01-25 RX ADMIN — Medication 0.5 MILLIGRAM(S): at 20:09

## 2023-01-25 NOTE — BH PSYCHOLOGY - GROUP THERAPY NOTE - NSBHPSYCHOLPARTICIPCOMMENT_PSY_A_CORE FT
Pt came to group psychotherapy session on time. Pt was alert and fully engaged. Group session was focused on introducing skills related to mindful communication when talking to others. Writer reviewed DBT ayla skills with group and discussed the importance of an assertive communication style while considering the method of delivery in order to gratify their needs. Writer specifically targeted "you" statements and invited group members to reframe the statements into "I" statements in an effort to reduce placing blame on others. Pt was receptive to skills introduced and volunteered to participate during group discussion. Pt was attentive and well-related during group. Pt provided examples related to material introduced to group and demonstrated good insight when reframing statements introduced by writer. Pt was also supportive of fellow peer in distress.  Pt came to group psychotherapy session on time. Pt was alert and fully engaged. Group session was focused on introducing skills related to mindful communication when talking to others. Writer reviewed DBT ayla skills with group and discussed the importance of an assertive communication style while considering the method of delivery in order to gratify their needs. Writer specifically targeted "you" statements and invited group members to reframe the statements into "I" statements in an effort to reduce placing blame on others. Pt was receptive to skills introduced and volunteered to participate during group discussion. Pt was attentive and well-related during group. Pt provided examples related to material introduced to group and demonstrated fair insight when reframing statements introduced by writer. Pt was also supportive of fellow peer in distress.

## 2023-01-25 NOTE — BH INPATIENT PSYCHIATRY PROGRESS NOTE - NSBHFUPINTERVALHXFT_PSY_A_CORE
Patient seen for follow up of psychosis  Chart reviewed and case d/w interdisciplinary team.   Adherent with medications last night but declares he will stop as he feels he is "slowed down" and his voice is subtly changed and lower   Patient demanding his discharge  and without medications  Reports he is a "the softest man you will ever meet" and he could easily be in longterm  He denies all the reports in the medical record about knives  Maintains that his neighbors are the only issue and their surveillance of him

## 2023-01-25 NOTE — BH INPATIENT PSYCHIATRY PROGRESS NOTE - NSBHASSESSSUMMFT_PSY_ALL_CORE
Patient is a 23 yo single  male domiciled with mother in [place], employed with [employer], with documented history of mental illness (schizoaffective disorder, bipolar type, cannabis and alcohol use disorder) and unclear PMH who was bib ems activated by mother on account of worsening verbal aggression and auditory hallucinations in context of treatment noncompliance.    On initial assessment, patient presents w/ symptoms of psychosis, including prominent and pervasive paranoid/persecutory delusions related to his neighbors, co-workers, and mother in the setting of treatment non-adherence. Paranoia accompanied by agitation, aggression (per collateral provider), violent ideation, and A/H. Patient denies S/I and H/I. Patient's severe paranoia, limited insight, and poor judgment render him an acute risk to himself and others. He would benefit from inpatient hospitalization for safety, stabilization, and medication optimization. Routine observation appropriate. Will start perphenazine 4mg qhs -- a more weight-neutral agent -- due to obese BMI. Of note, patient appears to have gynecomastia (w/ documented history of successful risperidone trial; despite success, would not initiate this agent at this time as patient reports feeling "feminized"). Will add Klonopin 0.5mg BID for psychotic anxiety. Due to patient's stated disinterest in psychotropic medications will hold off on re-starting Depakote, which, per chart, patient had been successful on during previous admission.     Today 1/26  patient again presents as more paranoid and irritable compared to yesterday  Multiple paranoid delusions and declares he will begin refusing medications   Will continue with perphenazine titration to 12 mg  If patient does begin to refuse will begin plan for treatment over objection    PLAN  - Admit on 9.27  - Routine observation appropriate  - Increase perphenazine to 8mg qhs for psychosis (utilizing this FGA for its favorable side effect profile)  - Clonazepam 0.5mg BID for psychotic anxiety  - No acute medical concerns  - Individual, group, milieu therapy as appropriate  - Obtain collateral information (note: patient has not given treatment team consent to speak w/ mother)  - Dispo: when stable

## 2023-01-25 NOTE — BH INPATIENT PSYCHIATRY PROGRESS NOTE - MSE UNSTRUCTURED FT
On exam today the patient is irritable and demanding.    Speech is loud, clear but mildly rapid and pressured.    Thought process: linear and goal directed.    Thought content: with multiple paranoid delusions related to neighbors, work, the police and his mother    Perception: Denies hearing voices or other perceptual disturbances   Mood: Describes as "frustrated".   Affect: flat.    Patient denies active suicidal ideation, intention and plan.    Patient states "don't poke the bear" and "I could be in [facility]" but denies active aggressive/homicidal ideation, intent or plan.   Patient is Alert and oriented .   Fund of knowledge is fair. Memory is intact  Insight and judgment are fair.   Impulse control is intact at this time.

## 2023-01-25 NOTE — BH INPATIENT PSYCHIATRY PROGRESS NOTE - CURRENT MEDICATION
MEDICATIONS  (STANDING):  clonazePAM  Tablet 0.5 milliGRAM(s) Oral two times a day  perphenazine 12 milliGRAM(s) Oral at bedtime    MEDICATIONS  (PRN):  diphenhydrAMINE 50 milliGRAM(s) Oral every 6 hours PRN EPS  diphenhydrAMINE Injectable 50 milliGRAM(s) IntraMuscular once PRN EPS  haloperidol     Tablet 5 milliGRAM(s) Oral every 6 hours PRN agitation  haloperidol    Injectable 5 milliGRAM(s) IntraMuscular once PRN severe agitation secondary to psychosis  LORazepam     Tablet 2 milliGRAM(s) Oral every 6 hours PRN Agitation  LORazepam     Tablet 2 milliGRAM(s) Oral every 6 hours PRN Agitation  LORazepam   Injectable 2 milliGRAM(s) IntraMuscular once PRN severe agitation secondary to psychosis

## 2023-01-26 PROCEDURE — 90834 PSYTX W PT 45 MINUTES: CPT

## 2023-01-26 PROCEDURE — 99232 SBSQ HOSP IP/OBS MODERATE 35: CPT | Mod: GC

## 2023-01-26 RX ORDER — ARIPIPRAZOLE 15 MG/1
5 TABLET ORAL AT BEDTIME
Refills: 0 | Status: DISCONTINUED | OUTPATIENT
Start: 2023-01-26 | End: 2023-01-27

## 2023-01-26 RX ORDER — NICOTINE POLACRILEX 2 MG
4 GUM BUCCAL
Refills: 0 | Status: DISCONTINUED | OUTPATIENT
Start: 2023-01-26 | End: 2023-02-15

## 2023-01-26 RX ORDER — CLONAZEPAM 1 MG
1 TABLET ORAL AT BEDTIME
Refills: 0 | Status: DISCONTINUED | OUTPATIENT
Start: 2023-01-26 | End: 2023-01-27

## 2023-01-26 RX ADMIN — Medication 50 MILLIGRAM(S): at 20:15

## 2023-01-26 RX ADMIN — Medication 0.5 MILLIGRAM(S): at 08:30

## 2023-01-26 RX ADMIN — ARIPIPRAZOLE 5 MILLIGRAM(S): 15 TABLET ORAL at 20:09

## 2023-01-26 RX ADMIN — Medication 1 MILLIGRAM(S): at 20:09

## 2023-01-26 NOTE — BH INPATIENT PSYCHIATRY PROGRESS NOTE - PRN MEDS
MEDICATIONS  (PRN):  diphenhydrAMINE 50 milliGRAM(s) Oral every 6 hours PRN EPS  diphenhydrAMINE Injectable 50 milliGRAM(s) IntraMuscular once PRN EPS  haloperidol     Tablet 5 milliGRAM(s) Oral every 6 hours PRN agitation  haloperidol    Injectable 5 milliGRAM(s) IntraMuscular once PRN severe agitation secondary to psychosis  LORazepam     Tablet 2 milliGRAM(s) Oral every 6 hours PRN Agitation  LORazepam     Tablet 2 milliGRAM(s) Oral every 6 hours PRN Agitation  LORazepam   Injectable 2 milliGRAM(s) IntraMuscular once PRN severe agitation secondary to psychosis   MEDICATIONS  (PRN):  diphenhydrAMINE 50 milliGRAM(s) Oral every 6 hours PRN EPS  diphenhydrAMINE Injectable 50 milliGRAM(s) IntraMuscular once PRN EPS  haloperidol     Tablet 5 milliGRAM(s) Oral every 6 hours PRN agitation  haloperidol    Injectable 5 milliGRAM(s) IntraMuscular once PRN severe agitation secondary to psychosis  LORazepam     Tablet 2 milliGRAM(s) Oral every 6 hours PRN Agitation  LORazepam     Tablet 2 milliGRAM(s) Oral every 6 hours PRN Agitation  LORazepam   Injectable 2 milliGRAM(s) IntraMuscular once PRN severe agitation secondary to psychosis  nicotine  Polacrilex Gum 4 milliGRAM(s) Oral five times a day PRN NRT

## 2023-01-26 NOTE — BH INPATIENT PSYCHIATRY PROGRESS NOTE - MSE UNSTRUCTURED FT
On exam today the patient is irritable and demanding.    Speech is loud, clear but mildly rapid and pressured.    Thought process: linear and goal directed.    Thought content: with multiple paranoid delusions related to neighbors, work, the police and his mother    Perception: Denies hearing voices or other perceptual disturbances   Mood: Describes as "upset".   Affect: flat.    Patient denies active suicidal ideation, intention and plan.    Patient states "don't poke the bear" but denies active aggressive/homicidal ideation, intent or plan.   Patient is Alert and oriented .   Fund of knowledge is fair. Memory is intact  Insight and judgment are fair.   Impulse control is intact at this time.

## 2023-01-26 NOTE — BH INPATIENT PSYCHIATRY PROGRESS NOTE - NSBHMETABOLIC_PSY_ALL_CORE_FT
BMI: BMI (kg/m2): 31.8 (01-23-23 @ 15:12)  HbA1c: A1C with Estimated Average Glucose Result: 5.8 % (01-24-23 @ 08:00)    Glucose:   BP: 125/68 (01-23-23 @ 13:34) (125/68 - 125/68)  Lipid Panel: Date/Time: 01-24-23 @ 08:00  Cholesterol, Serum: 224  Direct LDL: --  HDL Cholesterol, Serum: 38  Total Cholesterol/HDL Ration Measurement: --  Triglycerides, Serum: 109   BMI: BMI (kg/m2): 31.8 (01-23-23 @ 15:12)  HbA1c: A1C with Estimated Average Glucose Result: 5.8 % (01-24-23 @ 08:00)    Glucose:   BP: --  Lipid Panel: Date/Time: 01-24-23 @ 08:00  Cholesterol, Serum: 224  Direct LDL: --  HDL Cholesterol, Serum: 38  Total Cholesterol/HDL Ration Measurement: --  Triglycerides, Serum: 109

## 2023-01-26 NOTE — BH INPATIENT PSYCHIATRY PROGRESS NOTE - CURRENT MEDICATION
MEDICATIONS  (STANDING):  ARIPiprazole 5 milliGRAM(s) Oral at bedtime  clonazePAM  Tablet 1 milliGRAM(s) Oral at bedtime    MEDICATIONS  (PRN):  diphenhydrAMINE 50 milliGRAM(s) Oral every 6 hours PRN EPS  diphenhydrAMINE Injectable 50 milliGRAM(s) IntraMuscular once PRN EPS  haloperidol     Tablet 5 milliGRAM(s) Oral every 6 hours PRN agitation  haloperidol    Injectable 5 milliGRAM(s) IntraMuscular once PRN severe agitation secondary to psychosis  LORazepam     Tablet 2 milliGRAM(s) Oral every 6 hours PRN Agitation  LORazepam     Tablet 2 milliGRAM(s) Oral every 6 hours PRN Agitation  LORazepam   Injectable 2 milliGRAM(s) IntraMuscular once PRN severe agitation secondary to psychosis   MEDICATIONS  (STANDING):  ARIPiprazole 5 milliGRAM(s) Oral at bedtime  clonazePAM  Tablet 1 milliGRAM(s) Oral at bedtime    MEDICATIONS  (PRN):  diphenhydrAMINE 50 milliGRAM(s) Oral every 6 hours PRN EPS  diphenhydrAMINE Injectable 50 milliGRAM(s) IntraMuscular once PRN EPS  haloperidol     Tablet 5 milliGRAM(s) Oral every 6 hours PRN agitation  haloperidol    Injectable 5 milliGRAM(s) IntraMuscular once PRN severe agitation secondary to psychosis  LORazepam     Tablet 2 milliGRAM(s) Oral every 6 hours PRN Agitation  LORazepam     Tablet 2 milliGRAM(s) Oral every 6 hours PRN Agitation  LORazepam   Injectable 2 milliGRAM(s) IntraMuscular once PRN severe agitation secondary to psychosis  nicotine  Polacrilex Gum 4 milliGRAM(s) Oral five times a day PRN NRT

## 2023-01-26 NOTE — BH INPATIENT PSYCHIATRY PROGRESS NOTE - NSBHASSESSSUMMFT_PSY_ALL_CORE
Patient is a 23 yo single  male domiciled with mother in [place], employed with [employer], with documented history of mental illness (schizoaffective disorder, bipolar type, cannabis and alcohol use disorder) and unclear PMH who was bib ems activated by mother on account of worsening verbal aggression and auditory hallucinations in context of treatment noncompliance.    On initial assessment, patient presents w/ symptoms of psychosis, including prominent and pervasive paranoid/persecutory delusions related to his neighbors, co-workers, and mother in the setting of treatment non-adherence. Paranoia accompanied by agitation, aggression (per collateral provider), violent ideation, and A/H. Patient denies S/I and H/I. Patient's severe paranoia, limited insight, and poor judgment render him an acute risk to himself and others. He would benefit from inpatient hospitalization for safety, stabilization, and medication optimization. Routine observation appropriate. Will start perphenazine 4mg qhs -- a more weight-neutral agent -- due to obese BMI. Of note, patient appears to have gynecomastia (w/ documented history of successful risperidone trial; despite success, would not initiate this agent at this time as patient reports feeling "feminized"). Will add Klonopin 0.5mg BID for psychotic anxiety. Due to patient's stated disinterest in psychotropic medications will hold off on re-starting Depakote, which, per chart, patient had been successful on during previous admission.     Today, patient presents as paranoid and discharge-focused. Episode of agitation early in AM that did not require PRN medication. Continues to endorse multiple paranoid delusions. Reports he will refuse medication but continues to accept medication. Mother states patient has done well on medication in the past and she is concerned for her and his safety. Does not want him to return home if he remains unmedicated. Will discontinue perphenazine and start aripiprazole 5mg qhs due to its DELONG option. Will change Klonopin to 1mg qhs to limit amount of times per day patient takes meds.    PLAN  - Admit on 9.27  - Routine observation appropriate  - d/c perphenazine  - start aripiprazole 5mg qhs due to DELONG option  - Clonazepam 1mg qhs for psychotic anxiety  - No acute medical concerns  - Individual, group, milieu therapy as appropriate  - Obtain collateral information   - Dispo: when stable

## 2023-01-26 NOTE — BH INPATIENT PSYCHIATRY PROGRESS NOTE - NSBHFUPINTERVALHXFT_PSY_A_CORE
Patient seen for follow up of psychosis. Chart reviewed and case d/w interdisciplinary team. Adherent with medications however says he will no longer take medications, in fair behavioral overnight however patient agitated in AM, fair sleep. Per staff, patient became angry and agitated this morning when given AM meds. Reported that he was given an extra medication last night that he did not want. Says he does not want medications. Reports they affect his mood; make him sad or make him feel "girly." Would be amenable to meeting w/ therapist weekly but not med management. Remains paranoid about neighbors, mother. Does not believe he has psychiatric illness. Psychoeducation provided on brain as an organ, psychiatric illness, and the need for medication. Patient told mother would be more comfortable taking him after discharge if he were on medications; despite this, patient says he does not want medications. Demands discharge.

## 2023-01-27 PROCEDURE — 99231 SBSQ HOSP IP/OBS SF/LOW 25: CPT | Mod: GC

## 2023-01-27 PROCEDURE — 90853 GROUP PSYCHOTHERAPY: CPT

## 2023-01-27 RX ORDER — CLONAZEPAM 1 MG
1 TABLET ORAL AT BEDTIME
Refills: 0 | Status: DISCONTINUED | OUTPATIENT
Start: 2023-01-27 | End: 2023-01-29

## 2023-01-27 RX ORDER — ARIPIPRAZOLE 15 MG/1
5 TABLET ORAL DAILY
Refills: 0 | Status: DISCONTINUED | OUTPATIENT
Start: 2023-01-27 | End: 2023-01-29

## 2023-01-27 RX ADMIN — ARIPIPRAZOLE 5 MILLIGRAM(S): 15 TABLET ORAL at 10:33

## 2023-01-27 RX ADMIN — Medication 50 MILLIGRAM(S): at 22:07

## 2023-01-27 NOTE — BH INPATIENT PSYCHIATRY PROGRESS NOTE - NSBHMETABOLIC_PSY_ALL_CORE_FT
BMI: BMI (kg/m2): 31.8 (01-23-23 @ 15:12)  HbA1c: A1C with Estimated Average Glucose Result: 5.8 % (01-24-23 @ 08:00)    Glucose:   BP: 130/80 (01-27-23 @ 06:06) (130/80 - 130/80)  Lipid Panel: Date/Time: 01-24-23 @ 08:00  Cholesterol, Serum: 224  Direct LDL: --  HDL Cholesterol, Serum: 38  Total Cholesterol/HDL Ration Measurement: --  Triglycerides, Serum: 109

## 2023-01-27 NOTE — BH INPATIENT PSYCHIATRY PROGRESS NOTE - CURRENT MEDICATION
MEDICATIONS  (STANDING):  ARIPiprazole 5 milliGRAM(s) Oral daily    MEDICATIONS  (PRN):  clonazePAM  Tablet 1 milliGRAM(s) Oral at bedtime PRN psychotic anxiety  diphenhydrAMINE 50 milliGRAM(s) Oral every 6 hours PRN EPS  diphenhydrAMINE Injectable 50 milliGRAM(s) IntraMuscular once PRN EPS  haloperidol     Tablet 5 milliGRAM(s) Oral every 6 hours PRN agitation  haloperidol    Injectable 5 milliGRAM(s) IntraMuscular once PRN severe agitation secondary to psychosis  LORazepam     Tablet 2 milliGRAM(s) Oral every 6 hours PRN Agitation  LORazepam     Tablet 2 milliGRAM(s) Oral every 6 hours PRN Agitation  LORazepam   Injectable 2 milliGRAM(s) IntraMuscular once PRN severe agitation secondary to psychosis  nicotine  Polacrilex Gum 4 milliGRAM(s) Oral five times a day PRN NRT

## 2023-01-27 NOTE — BH INPATIENT PSYCHIATRY PROGRESS NOTE - NSBHFUPINTERVALHXFT_PSY_A_CORE
Patient seen for follow up of psychosis. Chart reviewed and case d/w interdisciplinary team. Adherent with medications, fair to poor sleep, in fair behavioral control overnight. Patient calmer this AM, although revs up at times. Remains paranoid, occasionally derails in thought process. Discusses issues w/ neighbors, co-workers, and mother. Believes people are monitoring his devices. Feels neglected by parents and siblings. Says they aren't around in the "right way." Talks at length about the importance of not forming relationships w/ coworkers and maintaining proper boundaries -- despite this not being the topic of conversation. Feels safe on the unit. Sleep poor; suspected due to night-time aripiprazole. Will change to the daytime. No known medication side effects. No physical complaints.

## 2023-01-27 NOTE — BH INPATIENT PSYCHIATRY PROGRESS NOTE - MSE UNSTRUCTURED FT
On exam today the patient is irritable and demanding.    Speech is loud, clear but mildly rapid and pressured.    Thought process: linear w/ occasional derailments   Thought content: with multiple paranoid delusions related to neighbors, work, the police and his mother    Perception: Denies hearing voices or other perceptual disturbances   Mood: Describes as "fine".   Affect: animated, irritable at times   Patient denies active suicidal ideation, intention and plan.    Patient denies active aggressive/homicidal ideation, intent or plan.   Patient is Alert and oriented .   Fund of knowledge is fair. Memory is intact  Insight and judgment are fair.   Impulse control is intact at this time.

## 2023-01-27 NOTE — BH INPATIENT PSYCHIATRY PROGRESS NOTE - PRN MEDS
MEDICATIONS  (PRN):  clonazePAM  Tablet 1 milliGRAM(s) Oral at bedtime PRN psychotic anxiety  diphenhydrAMINE 50 milliGRAM(s) Oral every 6 hours PRN EPS  diphenhydrAMINE Injectable 50 milliGRAM(s) IntraMuscular once PRN EPS  haloperidol     Tablet 5 milliGRAM(s) Oral every 6 hours PRN agitation  haloperidol    Injectable 5 milliGRAM(s) IntraMuscular once PRN severe agitation secondary to psychosis  LORazepam     Tablet 2 milliGRAM(s) Oral every 6 hours PRN Agitation  LORazepam     Tablet 2 milliGRAM(s) Oral every 6 hours PRN Agitation  LORazepam   Injectable 2 milliGRAM(s) IntraMuscular once PRN severe agitation secondary to psychosis  nicotine  Polacrilex Gum 4 milliGRAM(s) Oral five times a day PRN NRT

## 2023-01-27 NOTE — BH PSYCHOLOGY - GROUP THERAPY NOTE - NSBHPSYCHOLPARTICIPCOMMENT_PSY_A_CORE FT
Patient attended Psychology Group in its entirety, and actively and meaningfully participated in the group discussion on Self-Esteem and Emotions (specifically "guilt"). Patient shared that he was raised to test his limits and never settle for less. He explained how he has used guilt as a motivator; however, recognized the toll this has taken on him emotionally. Patient shared that his self-talk is often harsh and judgmental. He was open and receptive to psychoeducation provided on how our own self-talk impacts our self-esteems and perceptions of our capabilities. Patient provided appropriate support, feedback and resonation to peers with therapeutic effect.

## 2023-01-27 NOTE — BH INPATIENT PSYCHIATRY PROGRESS NOTE - NSBHASSESSSUMMFT_PSY_ALL_CORE
Patient is a 21 yo single  male domiciled with mother in [place], employed with [employer], with documented history of mental illness (schizoaffective disorder, bipolar type, cannabis and alcohol use disorder) and unclear PMH who was bib ems activated by mother on account of worsening verbal aggression and auditory hallucinations in context of treatment noncompliance.    On initial assessment, patient presents w/ symptoms of psychosis, including prominent and pervasive paranoid/persecutory delusions related to his neighbors, co-workers, and mother in the setting of treatment non-adherence. Paranoia accompanied by agitation, aggression (per collateral provider), violent ideation, and A/H. Patient denies S/I and H/I. Patient's severe paranoia, limited insight, and poor judgment render him an acute risk to himself and others. He would benefit from inpatient hospitalization for safety, stabilization, and medication optimization. Routine observation appropriate. Will start perphenazine 4mg qhs -- a more weight-neutral agent -- due to obese BMI. Of note, patient appears to have gynecomastia (w/ documented history of successful risperidone trial; despite success, would not initiate this agent at this time as patient reports feeling "feminized"). Will add Klonopin 0.5mg BID for psychotic anxiety. Due to patient's stated disinterest in psychotropic medications will hold off on re-starting Depakote, which, per chart, patient had been successful on during previous admission.     Today, patient presents as calmer, although revs up at times. Patient remains paranoid; derails at times. Discusses issues w/ neighbors, coworkers, mother. Discusses relationship w/ coworkers at length. No S/I or H/I. In fair behavioral control. Poor sleep; suspected due to night-time aripiprazole. Will change timing to AM. Will make Klonopin 1mg qhs PRN to limit polypharmacy (patient also notes he does not have issues sleeping at night).     PLAN  - Admit on 9.27  - Routine observation appropriate  - d/c perphenazine  - change aripiprazole to 5mg AM due to poor sleep after evening dose   - change clonazepam 1mg qhs to PRN reduce polypharmacy  - No acute medical concerns  - Individual, group, milieu therapy as appropriate  - Obtain collateral information   - Dispo: when stable

## 2023-01-28 RX ADMIN — Medication 50 MILLIGRAM(S): at 22:14

## 2023-01-28 RX ADMIN — ARIPIPRAZOLE 5 MILLIGRAM(S): 15 TABLET ORAL at 09:05

## 2023-01-29 PROCEDURE — 99231 SBSQ HOSP IP/OBS SF/LOW 25: CPT

## 2023-01-29 RX ORDER — ARIPIPRAZOLE 15 MG/1
10 TABLET ORAL DAILY
Refills: 0 | Status: DISCONTINUED | OUTPATIENT
Start: 2023-01-29 | End: 2023-01-31

## 2023-01-29 RX ORDER — ARIPIPRAZOLE 15 MG/1
10 TABLET ORAL DAILY
Refills: 0 | Status: DISCONTINUED | OUTPATIENT
Start: 2023-01-29 | End: 2023-01-29

## 2023-01-29 RX ADMIN — ARIPIPRAZOLE 10 MILLIGRAM(S): 15 TABLET ORAL at 09:54

## 2023-01-29 RX ADMIN — Medication 50 MILLIGRAM(S): at 19:57

## 2023-01-29 RX ADMIN — Medication 1 MILLIGRAM(S): at 19:57

## 2023-01-29 NOTE — BH INPATIENT PSYCHIATRY PROGRESS NOTE - NSBHASSESSSUMMFT_PSY_ALL_CORE
Patient is a 23 yo single  male domiciled with mother in [place], employed with [employer], with documented history of mental illness (schizoaffective disorder, bipolar type, cannabis and alcohol use disorder) and unclear PMH who was bib ems activated by mother on account of worsening verbal aggression and auditory hallucinations in context of treatment noncompliance.    On initial assessment, patient presents w/ symptoms of psychosis, including prominent and pervasive paranoid/persecutory delusions related to his neighbors, co-workers, and mother in the setting of treatment non-adherence. Paranoia accompanied by agitation, aggression (per collateral provider), violent ideation, and A/H. Patient denies S/I and H/I. Patient's severe paranoia, limited insight, and poor judgment render him an acute risk to himself and others. He would benefit from inpatient hospitalization for safety, stabilization, and medication optimization. Routine observation appropriate. Will start perphenazine 4mg qhs -- a more weight-neutral agent -- due to obese BMI. Of note, patient appears to have gynecomastia (w/ documented history of successful risperidone trial; despite success, would not initiate this agent at this time as patient reports feeling "feminized"). Will add Klonopin 0.5mg BID for psychotic anxiety. Due to patient's stated disinterest in psychotropic medications will hold off on re-starting Depakote, which, per chart, patient had been successful on during previous admission.     1/29 Update   patient calmer and more cooperative   Remains paranoid  Becomes upset when dose increase is discussed      PLAN  - Admit on 9.27  - Routine observation appropriate  Increase aripiprazole to 10 mg AM  (plan for potential crossover to DELONG)   - change clonazepam 1mg qhs to PRN reduce polypharmacy  - No acute medical concerns  - Individual, group, milieu therapy as appropriate  - Obtain collateral information   - Dispo: when stable

## 2023-01-29 NOTE — BH INPATIENT PSYCHIATRY PROGRESS NOTE - CURRENT MEDICATION
MEDICATIONS  (STANDING):  ARIPiprazole 10 milliGRAM(s) Oral daily    MEDICATIONS  (PRN):  clonazePAM  Tablet 1 milliGRAM(s) Oral at bedtime PRN psychotic anxiety  diphenhydrAMINE 50 milliGRAM(s) Oral every 6 hours PRN EPS  diphenhydrAMINE Injectable 50 milliGRAM(s) IntraMuscular once PRN EPS  haloperidol     Tablet 5 milliGRAM(s) Oral every 6 hours PRN agitation  haloperidol    Injectable 5 milliGRAM(s) IntraMuscular once PRN severe agitation secondary to psychosis  LORazepam     Tablet 2 milliGRAM(s) Oral every 6 hours PRN Agitation  LORazepam     Tablet 2 milliGRAM(s) Oral every 6 hours PRN Agitation  LORazepam   Injectable 2 milliGRAM(s) IntraMuscular once PRN severe agitation secondary to psychosis  nicotine  Polacrilex Gum 4 milliGRAM(s) Oral five times a day PRN NRT

## 2023-01-29 NOTE — BH INPATIENT PSYCHIATRY PROGRESS NOTE - MSE UNSTRUCTURED FT
On exam today the patient is superficially cooperative     Speech is less pressured.    Thought process: linear w/ occasional derailments   Thought content: with multiple paranoid delusions related to neighbors, work, the police and his mother    Perception: Denies hearing voices or other perceptual disturbances   Mood: Describes as "OK".   Affect: full range    Patient denies active suicidal ideation, intention and plan.    Patient denies active aggressive/homicidal ideation, intent or plan.   Patient is Alert and oriented .   Fund of knowledge is fair. Memory is intact  Insight and judgment are fair.   Impulse control is intact at this time.

## 2023-01-29 NOTE — BH INPATIENT PSYCHIATRY PROGRESS NOTE - NSTXPSYCHOGOALOTHER_PSY_ALL_CORE
improvement in psychotic symptoms and stable for discharge with a CGI Improvement score =2

## 2023-01-29 NOTE — BH INPATIENT PSYCHIATRY PROGRESS NOTE - NSBHFUPINTERVALHXFT_PSY_A_CORE
Patient seen for follow up of psychosis.   Chart reviewed and case discussed with nursing staff   Patient initially admits to feeling the aripiprazole was helpful  Became upset when dose increase was discussed as well as plan for DELONG

## 2023-01-30 PROCEDURE — 99232 SBSQ HOSP IP/OBS MODERATE 35: CPT | Mod: GC

## 2023-01-30 RX ADMIN — ARIPIPRAZOLE 10 MILLIGRAM(S): 15 TABLET ORAL at 08:45

## 2023-01-30 NOTE — BH INPATIENT PSYCHIATRY PROGRESS NOTE - NSBHFUPINTERVALCCFT_PSY_A_CORE
"There's a board game called Life... and I'm not the henry cheating in it." "I have these two eyes on you and I know your type... I've seen you and how you interact... You are keeping me here and not letting me go because you are a bigot and a racist "

## 2023-01-30 NOTE — BH INPATIENT PSYCHIATRY PROGRESS NOTE - MSE UNSTRUCTURED FT
On exam today the patient is superficially cooperative     Speech animated, pressured, increasingly agitated   Thought process: linear w/ occasional derailments   Thought content: with multiple paranoid delusions related to neighbors, work, the police and his mother    Perception: Denies hearing voices or other perceptual disturbances   Mood: Describes as "OK".   Affect: full range    Patient denies active suicidal ideation, intention and plan.    Patient denies active aggressive/homicidal ideation, intent or plan.   Patient is Alert and oriented .   Fund of knowledge is fair. Memory is intact  Insight and judgment are fair.   Impulse control is intact at this time.       On exam today the patient is superficially cooperative     Speech animated, pressured, increasingly agitated   Thought process: linear w/ occasional derailments   Thought content: with multiple paranoid delusions related to neighbors, work, the police and his mother as well as MD's on treatment team   Perception: Denies hearing voices or other perceptual disturbances   Mood: Describes as "OK".   Affect: full range    Patient denies active suicidal ideation, intention and plan.    Patient denies active aggressive/homicidal ideation, intent or plan.   Patient is Alert and oriented .   Fund of knowledge is fair. Memory is intact  Insight and judgment are fair.   Impulse control is intact at this time.

## 2023-01-30 NOTE — BH INPATIENT PSYCHIATRY PROGRESS NOTE - NSBHASSESSSUMMFT_PSY_ALL_CORE
Patient is a 23 yo single  male domiciled with mother in [place], employed with [employer], with documented history of mental illness (schizoaffective disorder, bipolar type, cannabis and alcohol use disorder) and unclear PMH who was bib ems activated by mother on account of worsening verbal aggression and auditory hallucinations in context of treatment noncompliance.    On initial assessment, patient presents w/ symptoms of psychosis, including prominent and pervasive paranoid/persecutory delusions related to his neighbors, co-workers, and mother in the setting of treatment non-adherence. Paranoia accompanied by agitation, aggression (per collateral provider), violent ideation, and A/H. Patient denies S/I and H/I. Patient's severe paranoia, limited insight, and poor judgment render him an acute risk to himself and others. He would benefit from inpatient hospitalization for safety, stabilization, and medication optimization. Routine observation appropriate. Will start perphenazine 4mg qhs -- a more weight-neutral agent -- due to obese BMI. Of note, patient appears to have gynecomastia (w/ documented history of successful risperidone trial; despite success, would not initiate this agent at this time as patient reports feeling "feminized"). Will add Klonopin 0.5mg BID for psychotic anxiety. Due to patient's stated disinterest in psychotropic medications will hold off on re-starting Depakote, which, per chart, patient had been successful on during previous admission.     Patient very paranoid, loud, verbally aggressive. Limited insight into need for treatment. Extremely discharge-focused. Only willing to take aripiprazole 5mg and no more despite this not being a therapeutic dose. Patient submitted for court-ordered discharge. Treatment team will submit application for MOO.     PLAN  - Admit on 9.27  - Routine observation appropriate  Increase aripiprazole to 10 mg AM  (plan for potential crossover to DELONG)   - change clonazepam 1mg qhs to PRN reduce polypharmacy  - patient submitted application for court ordered discharge  - will submit application for MOO  - No acute medical concerns  - Individual, group, milieu therapy as appropriate  - Obtain collateral information   - Dispo: when stable

## 2023-01-30 NOTE — BH INPATIENT PSYCHIATRY PROGRESS NOTE - NSBHFUPINTERVALHXFT_PSY_A_CORE
Patient seen for follow up of psychosis. Chart reviewed and case d/w interdisciplinary team. Adherent w/ aripiprazole 5mg (not 10mg), fair sleep, in fair behavioral control. Patient initially calm before revving up and becoming increasingly agitated. Remains paranoid about neighbors and coworkers. Patient says he is willing to take aripiprazole 5mg and no more. Does not want his "brain fried." Reports not wanting medications to affect his brain's chemical balance. Demands discharge. Wants to take meds as needed and see weekly therapist. Psychoeducation provided on importance of medication adherence. Patient becomes increasingly frustrated, loud, although is not physically threatening. Requests new treatment team.   Patient initially admits to feeling the aripiprazole was helpful  Became upset when dose increase was discussed as well as plan for DELONG Patient seen for follow up of psychosis. Chart reviewed and case d/w interdisciplinary team. Adherent w/ aripiprazole 5mg (not 10mg), fair sleep, in fair behavioral control. Patient initially calm before revving up and becoming increasingly agitated.   Remains paranoid about neighbors and coworkers. Patient says he is willing to take aripiprazole 5mg and no more. Does not want his "brain fried." Reports not wanting medications to affect his brain's chemical balance.   Demands discharge. Wants to take meds only as he thinks he needs them as an outpatient and see weekly therapist.   Psychoeducation provided on importance of medication adherence. Denies having any symptoms or having an illness   Patient becomes increasingly frustrated, loud, although not physically threatening. Demands new treatment team.  Called MD a "Bigot and a Racist"  Demands a new MD and unable to see any of his symptoms or need for therapeutic doses of medications  Patient initially admits to feeling the aripiprazole was helpful but became upset when dose increase was discussed as well as plan for DELONG

## 2023-01-30 NOTE — BH INPATIENT PSYCHIATRY PROGRESS NOTE - NSBHMETABOLIC_PSY_ALL_CORE_FT
BMI: BMI (kg/m2): 31.8 (01-23-23 @ 15:12)  HbA1c: A1C with Estimated Average Glucose Result: 5.8 % (01-24-23 @ 08:00)    Glucose:   BP: 151/81 (01-30-23 @ 06:39) (151/81 - 151/81)  Lipid Panel: Date/Time: 01-24-23 @ 08:00  Cholesterol, Serum: 224  Direct LDL: --  HDL Cholesterol, Serum: 38  Total Cholesterol/HDL Ration Measurement: --  Triglycerides, Serum: 109

## 2023-01-31 PROCEDURE — 90853 GROUP PSYCHOTHERAPY: CPT

## 2023-01-31 PROCEDURE — 99232 SBSQ HOSP IP/OBS MODERATE 35: CPT | Mod: GC

## 2023-01-31 RX ORDER — OLANZAPINE 15 MG/1
10 TABLET, FILM COATED ORAL ONCE
Refills: 0 | Status: DISCONTINUED | OUTPATIENT
Start: 2023-01-31 | End: 2023-02-15

## 2023-01-31 RX ORDER — OLANZAPINE 15 MG/1
10 TABLET, FILM COATED ORAL AT BEDTIME
Refills: 0 | Status: DISCONTINUED | OUTPATIENT
Start: 2023-01-31 | End: 2023-02-02

## 2023-01-31 RX ADMIN — HALOPERIDOL DECANOATE 5 MILLIGRAM(S): 100 INJECTION INTRAMUSCULAR at 13:06

## 2023-01-31 RX ADMIN — ARIPIPRAZOLE 10 MILLIGRAM(S): 15 TABLET ORAL at 10:57

## 2023-01-31 RX ADMIN — Medication 50 MILLIGRAM(S): at 13:05

## 2023-01-31 NOTE — BH INPATIENT PSYCHIATRY PROGRESS NOTE - MSE UNSTRUCTURED FT
On exam today the patient is superficially cooperative     Speech low volume, somewhat laconic   Thought process: overall linear  Thought content: with multiple paranoid delusions related to neighbors, work, the police and his mother as well as MD's on treatment team   Perception: Denies hearing voices or other perceptual disturbances   Mood: Describes as "tired".   Affect: constricted   Patient denies active suicidal ideation, intention and plan.    Patient denies active aggressive/homicidal ideation, intent or plan.   Patient is Alert and oriented .   Fund of knowledge is fair. Memory is intact  Insight and judgment are fair.   Impulse control is tenuous at this time.       On exam today the patient is initially superficially cooperative   and later severely paranoid again  Speech low volume, somewhat laconic   Thought process: overall linear  Thought content: with multiple paranoid delusions related to neighbors, work, the police and his mother as well as MD's on treatment team   Perception: Denies hearing voices or other perceptual disturbances   Mood: Describes as "tired".   Affect: constricted   Patient denies active suicidal ideation, intention and plan.    Patient denies active aggressive/homicidal ideation, intent or plan.   Patient is Alert and oriented .   Fund of knowledge is fair. Memory is intact  Insight and judgment are fair.   Impulse control is tenuous at this time.

## 2023-01-31 NOTE — BH PSYCHOLOGY - GROUP THERAPY NOTE - NSBHPSYCHOLPARTICIPCOMMENT_PSY_A_CORE FT
Pt came to group on time, was alert, and fully engaged. Group session was focused on identifying maladaptive thoughts related to the self and the impact thoughts have on emotions and behaviors. Group members shared maladaptive thoughts they experience and the self-destructive behaviors they engage in as a result. Patients were supportive of one another and shared adaptive coping strategies when experiencing self-doubt and intense emotions. Pt did not actively participate in group, however, pt informed the group that he felt "high" due to a medication change.

## 2023-01-31 NOTE — BH INPATIENT PSYCHIATRY PROGRESS NOTE - NSBHMETABOLIC_PSY_ALL_CORE_FT
BMI: BMI (kg/m2): 31.8 (01-23-23 @ 15:12)  HbA1c: A1C with Estimated Average Glucose Result: 5.8 % (01-24-23 @ 08:00)    Glucose:   BP: 138/73 (01-31-23 @ 06:24) (138/73 - 151/81)  Lipid Panel: Date/Time: 01-24-23 @ 08:00  Cholesterol, Serum: 224  Direct LDL: --  HDL Cholesterol, Serum: 38  Total Cholesterol/HDL Ration Measurement: --  Triglycerides, Serum: 109

## 2023-01-31 NOTE — BH INPATIENT PSYCHIATRY PROGRESS NOTE - CURRENT MEDICATION
MEDICATIONS  (STANDING):  ARIPiprazole 10 milliGRAM(s) Oral daily    MEDICATIONS  (PRN):  clonazePAM  Tablet 1 milliGRAM(s) Oral at bedtime PRN psychotic anxiety  diphenhydrAMINE 50 milliGRAM(s) Oral every 6 hours PRN EPS  diphenhydrAMINE Injectable 50 milliGRAM(s) IntraMuscular once PRN EPS  haloperidol     Tablet 5 milliGRAM(s) Oral every 6 hours PRN agitation  haloperidol    Injectable 5 milliGRAM(s) IntraMuscular once PRN severe agitation secondary to psychosis  nicotine  Polacrilex Gum 4 milliGRAM(s) Oral five times a day PRN NRT   MEDICATIONS  (STANDING):  ARIPiprazole 10 milliGRAM(s) Oral daily    MEDICATIONS  (PRN):  clonazePAM  Tablet 1 milliGRAM(s) Oral at bedtime PRN psychotic anxiety  diphenhydrAMINE 50 milliGRAM(s) Oral every 6 hours PRN EPS  diphenhydrAMINE Injectable 50 milliGRAM(s) IntraMuscular once PRN EPS  haloperidol     Tablet 5 milliGRAM(s) Oral every 6 hours PRN agitation  haloperidol    Injectable 5 milliGRAM(s) IntraMuscular once PRN severe agitation secondary to psychosis  nicotine  Polacrilex Gum 4 milliGRAM(s) Oral five times a day PRN NRT  OLANZapine Injectable 10 milliGRAM(s) IntraMuscular once PRN severe agitation  OLANZapine Injectable 10 milliGRAM(s) IntraMuscular once PRN severe agitation

## 2023-01-31 NOTE — BH INPATIENT PSYCHIATRY PROGRESS NOTE - PRN MEDS
MEDICATIONS  (PRN):  clonazePAM  Tablet 1 milliGRAM(s) Oral at bedtime PRN psychotic anxiety  diphenhydrAMINE 50 milliGRAM(s) Oral every 6 hours PRN EPS  diphenhydrAMINE Injectable 50 milliGRAM(s) IntraMuscular once PRN EPS  haloperidol     Tablet 5 milliGRAM(s) Oral every 6 hours PRN agitation  haloperidol    Injectable 5 milliGRAM(s) IntraMuscular once PRN severe agitation secondary to psychosis  nicotine  Polacrilex Gum 4 milliGRAM(s) Oral five times a day PRN NRT   MEDICATIONS  (PRN):  clonazePAM  Tablet 1 milliGRAM(s) Oral at bedtime PRN psychotic anxiety  diphenhydrAMINE 50 milliGRAM(s) Oral every 6 hours PRN EPS  diphenhydrAMINE Injectable 50 milliGRAM(s) IntraMuscular once PRN EPS  haloperidol     Tablet 5 milliGRAM(s) Oral every 6 hours PRN agitation  haloperidol    Injectable 5 milliGRAM(s) IntraMuscular once PRN severe agitation secondary to psychosis  nicotine  Polacrilex Gum 4 milliGRAM(s) Oral five times a day PRN NRT  OLANZapine Injectable 10 milliGRAM(s) IntraMuscular once PRN severe agitation  OLANZapine Injectable 10 milliGRAM(s) IntraMuscular once PRN severe agitation

## 2023-01-31 NOTE — BH INPATIENT PSYCHIATRY PROGRESS NOTE - NSBHASSESSSUMMFT_PSY_ALL_CORE
Patient is a 21 yo single  male domiciled with mother in [place], employed with [employer], with documented history of mental illness (schizoaffective disorder, bipolar type, cannabis and alcohol use disorder) and unclear PMH who was bib ems activated by mother on account of worsening verbal aggression and auditory hallucinations in context of treatment noncompliance.    On initial assessment, patient presents w/ symptoms of psychosis, including prominent and pervasive paranoid/persecutory delusions related to his neighbors, co-workers, and mother in the setting of treatment non-adherence. Paranoia accompanied by agitation, aggression (per collateral provider), violent ideation, and A/H. Patient denies S/I and H/I. Patient's severe paranoia, limited insight, and poor judgment render him an acute risk to himself and others. He would benefit from inpatient hospitalization for safety, stabilization, and medication optimization. Routine observation appropriate. Will start perphenazine 4mg qhs -- a more weight-neutral agent -- due to obese BMI. Of note, patient appears to have gynecomastia (w/ documented history of successful risperidone trial; despite success, would not initiate this agent at this time as patient reports feeling "feminized"). Will add Klonopin 0.5mg BID for psychotic anxiety. Due to patient's stated disinterest in psychotropic medications will hold off on re-starting Depakote, which, per chart, patient had been successful on during previous admission.     Patient calmer today. Not overtly paranoid, although paranoia likely remains. No aggression or agitation w/ treatment team, however, behavioral control remains tenuous. Insight remains limited, however, patient willing to take aripiprazole in order to hasten discharge. Despite this, patient remains skeptical about treatment. Will continue to plan for TOO.     PLAN  - Admit on 9.27  - Routine observation appropriate  - aripiprazole 10 mg AM  (plan for potential crossover to DELONG)   - change clonazepam 1mg qhs to PRN reduce polypharmacy  - patient submitted application for court ordered discharge  - will submit application for MOO  - No acute medical concerns  - Individual, group, milieu therapy as appropriate  - Obtain collateral information   - Dispo: when stable Patient is a 21 yo single  male domiciled with mother in [place], employed with [employer], with documented history of mental illness (schizoaffective disorder, bipolar type, cannabis and alcohol use disorder) and unclear PMH who was bib ems activated by mother on account of worsening verbal aggression and auditory hallucinations in context of treatment noncompliance.    On initial assessment, patient presents w/ symptoms of psychosis, including prominent and pervasive paranoid/persecutory delusions related to his neighbors, co-workers, and mother in the setting of treatment non-adherence. Paranoia accompanied by agitation, aggression (per collateral provider), violent ideation, and A/H. Patient denies S/I and H/I. Patient's severe paranoia, limited insight, and poor judgment render him an acute risk to himself and others. He would benefit from inpatient hospitalization for safety, stabilization, and medication optimization. Routine observation appropriate. Will start perphenazine 4mg qhs -- a more weight-neutral agent -- due to obese BMI. Of note, patient appears to have gynecomastia (w/ documented history of successful risperidone trial; despite success, would not initiate this agent at this time as patient reports feeling "feminized"). Will add Klonopin 0.5mg BID for psychotic anxiety. Due to patient's stated disinterest in psychotropic medications will hold off on re-starting Depakote, which, per chart, patient had been successful on during previous admission.     Patient calmer today. Not overtly paranoid, although paranoia likely remains. No aggression or agitation w/ treatment team, however, behavioral control remains tenuous. Insight remains limited, however, patient willing to take aripiprazole in order to hasten discharge. Despite this, patient remains skeptical about treatment. Will continue to plan for TOO.     PLAN  - Admit on 9.27  - Routine observation appropriate  - aripiprazole 10 mg AM  (plan for potential crossover to DELONG)   Will start crossover to olanzapine  - change clonazepam 1mg qhs to PRN reduce polypharmacy  - patient submitted application for court ordered discharge  - will submit application for MOO  - No acute medical concerns  - Individual, group, milieu therapy as appropriate  - Obtain collateral information   - Dispo: when stable
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
## 2023-02-01 PROCEDURE — 90785 PSYTX COMPLEX INTERACTIVE: CPT

## 2023-02-01 PROCEDURE — 99231 SBSQ HOSP IP/OBS SF/LOW 25: CPT | Mod: GC

## 2023-02-01 PROCEDURE — 90834 PSYTX W PT 45 MINUTES: CPT

## 2023-02-01 NOTE — BH PSYCHOLOGY - CLINICIAN PSYCHOTHERAPY NOTE - NSBHPSYCHOLBILLIND_PSY_A_CORE
74916 - Interactive Complexity (Add on code for maladaptive communication, emotional/behavioral conditions, mandated reporting or equipment/device/ services)

## 2023-02-01 NOTE — BH INPATIENT PSYCHIATRY PROGRESS NOTE - CURRENT MEDICATION
MEDICATIONS  (STANDING):  OLANZapine Disintegrating Tablet 10 milliGRAM(s) Oral at bedtime    MEDICATIONS  (PRN):  clonazePAM  Tablet 1 milliGRAM(s) Oral at bedtime PRN psychotic anxiety  diphenhydrAMINE 50 milliGRAM(s) Oral every 6 hours PRN EPS  diphenhydrAMINE Injectable 50 milliGRAM(s) IntraMuscular once PRN EPS  haloperidol     Tablet 5 milliGRAM(s) Oral every 6 hours PRN agitation  haloperidol    Injectable 5 milliGRAM(s) IntraMuscular once PRN severe agitation secondary to psychosis  nicotine  Polacrilex Gum 4 milliGRAM(s) Oral five times a day PRN NRT  OLANZapine Injectable 10 milliGRAM(s) IntraMuscular once PRN severe agitation  OLANZapine Injectable 10 milliGRAM(s) IntraMuscular once PRN severe agitation

## 2023-02-01 NOTE — BH INPATIENT PSYCHIATRY PROGRESS NOTE - MSE UNSTRUCTURED FT
On exam today the patient is initially superficially cooperative   and later severely paranoid again  Speech low volume, somewhat laconic   Thought process: overall linear  Thought content: with multiple paranoid delusions related to neighbors, work, the police and his mother as well as MD's on treatment team   Perception: Denies hearing voices or other perceptual disturbances   Mood: Describes as "fine".   Affect: constricted   Patient denies active suicidal ideation, intention and plan.    Patient denies active aggressive/homicidal ideation, intent or plan.   Patient is Alert and oriented .   Fund of knowledge is fair. Memory is intact  Insight and judgment are fair.   Impulse control is tenuous at this time.

## 2023-02-01 NOTE — BH INPATIENT PSYCHIATRY PROGRESS NOTE - NSBHFUPINTERVALHXFT_PSY_A_CORE
Patient seen for follow up of psychosis. Chart reviewed and case d/w interdisciplinary team. PSYCH EMERGENCY activated yesterday afternoon due to agitation. Accepted Haldol 5mg/Benadryl 50mg PO w/ significant encouragement. Patient w/ fair sleep. Overall in tenuous behavioral control. Patient calmer today. Reports he spoke w/  who encouraged him to "negotiate" w/ treatment team. Various medication options discussed w/ patient, including aripiprazole, perphenazine, and olanzapine, before patient agrees to take olanzapine tonight.

## 2023-02-01 NOTE — BH INPATIENT PSYCHIATRY PROGRESS NOTE - NSBHASSESSSUMMFT_PSY_ALL_CORE
Patient is a 23 yo single  male domiciled with mother in [place], employed with [employer], with documented history of mental illness (schizoaffective disorder, bipolar type, cannabis and alcohol use disorder) and unclear PMH who was bib ems activated by mother on account of worsening verbal aggression and auditory hallucinations in context of treatment noncompliance.    On initial assessment, patient presents w/ symptoms of psychosis, including prominent and pervasive paranoid/persecutory delusions related to his neighbors, co-workers, and mother in the setting of treatment non-adherence. Paranoia accompanied by agitation, aggression (per collateral provider), violent ideation, and A/H. Patient denies S/I and H/I. Patient's severe paranoia, limited insight, and poor judgment render him an acute risk to himself and others. He would benefit from inpatient hospitalization for safety, stabilization, and medication optimization. Routine observation appropriate. Will start perphenazine 4mg qhs -- a more weight-neutral agent -- due to obese BMI. Of note, patient appears to have gynecomastia (w/ documented history of successful risperidone trial; despite success, would not initiate this agent at this time as patient reports feeling "feminized"). Will add Klonopin 0.5mg BID for psychotic anxiety. Due to patient's stated disinterest in psychotropic medications will hold off on re-starting Depakote, which, per chart, patient had been successful on during previous admission.     Patient w/ episode of severe agitation triggering psychiatric emergency yesterday. IMs activated but patient eventually accepted Haldol 5mg/Benadryl 50mg PO. Patient calmer today. Wants to "negotiate" treatment. Willing to take olanzapine tonight. Behavioral control remains tenuous. Intense delusions likely persist.     PLAN  - Admit on 9.27  - Routine observation appropriate  - olanzapine 10mg qhs for psycohsis  - change clonazepam 1mg qhs to PRN reduce polypharmacy  - patient submitted application for court ordered discharge  - will submit application for MOO  - No acute medical concerns  - Individual, group, milieu therapy as appropriate  - Obtain collateral information   - Dispo: when stable
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
## 2023-02-01 NOTE — BH INPATIENT PSYCHIATRY PROGRESS NOTE - PRN MEDS
MEDICATIONS  (PRN):  clonazePAM  Tablet 1 milliGRAM(s) Oral at bedtime PRN psychotic anxiety  diphenhydrAMINE 50 milliGRAM(s) Oral every 6 hours PRN EPS  diphenhydrAMINE Injectable 50 milliGRAM(s) IntraMuscular once PRN EPS  haloperidol     Tablet 5 milliGRAM(s) Oral every 6 hours PRN agitation  haloperidol    Injectable 5 milliGRAM(s) IntraMuscular once PRN severe agitation secondary to psychosis  nicotine  Polacrilex Gum 4 milliGRAM(s) Oral five times a day PRN NRT  OLANZapine Injectable 10 milliGRAM(s) IntraMuscular once PRN severe agitation  OLANZapine Injectable 10 milliGRAM(s) IntraMuscular once PRN severe agitation

## 2023-02-01 NOTE — BH INPATIENT PSYCHIATRY PROGRESS NOTE - NSCGINOTASSEIMPRO_PSY_ALL_CORE
CGI not assessed

## 2023-02-01 NOTE — BH CHART NOTE - NSPSYPRGNOTEFT_PSY_ALL_CORE
Pt was interested in attending group psychotherapy. The minimum amount of pts required did not attend group. Session was treated as an individual psychotherapy session with writer. Pt discussed his goals and past achievements. Pt also discussed his past and current relationship with his father. Pt speech was tangential and normal in tone and volume. Thought process was coherent and linear.

## 2023-02-02 PROCEDURE — 99231 SBSQ HOSP IP/OBS SF/LOW 25: CPT

## 2023-02-02 RX ORDER — ARIPIPRAZOLE 15 MG/1
5 TABLET ORAL ONCE
Refills: 0 | Status: COMPLETED | OUTPATIENT
Start: 2023-02-02 | End: 2023-02-02

## 2023-02-02 RX ORDER — DIPHENHYDRAMINE HCL 50 MG
50 CAPSULE ORAL ONCE
Refills: 0 | Status: COMPLETED | OUTPATIENT
Start: 2023-02-02 | End: 2023-02-10

## 2023-02-02 RX ORDER — ARIPIPRAZOLE 15 MG/1
5 TABLET ORAL DAILY
Refills: 0 | Status: DISCONTINUED | OUTPATIENT
Start: 2023-02-02 | End: 2023-02-06

## 2023-02-02 RX ORDER — DIPHENHYDRAMINE HCL 50 MG
50 CAPSULE ORAL ONCE
Refills: 0 | Status: DISCONTINUED | OUTPATIENT
Start: 2023-02-02 | End: 2023-02-02

## 2023-02-02 RX ADMIN — ARIPIPRAZOLE 5 MILLIGRAM(S): 15 TABLET ORAL at 21:16
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
## 2023-02-02 NOTE — DIETITIAN INITIAL EVALUATION ADULT - PERTINENT MEDS FT
MEDICATIONS  (STANDING):  ARIPiprazole 5 milliGRAM(s) Oral daily  ARIPiprazole 5 milliGRAM(s) Oral once    MEDICATIONS  (PRN):  clonazePAM  Tablet 1 milliGRAM(s) Oral at bedtime PRN psychotic anxiety  diphenhydrAMINE 50 milliGRAM(s) Oral every 6 hours PRN EPS  diphenhydrAMINE Injectable 50 milliGRAM(s) IntraMuscular once PRN EPS  haloperidol     Tablet 5 milliGRAM(s) Oral every 6 hours PRN agitation  haloperidol    Injectable 5 milliGRAM(s) IntraMuscular once PRN severe agitation secondary to psychosis  nicotine  Polacrilex Gum 4 milliGRAM(s) Oral five times a day PRN NRT  OLANZapine Injectable 10 milliGRAM(s) IntraMuscular once PRN severe agitation  OLANZapine Injectable 10 milliGRAM(s) IntraMuscular once PRN severe agitation

## 2023-02-02 NOTE — BH INPATIENT PSYCHIATRY PROGRESS NOTE - NSBHASSESSSUMMFT_PSY_ALL_CORE
Patient is a 23 yo single  male domiciled with mother in [place], employed with [employer], with documented history of mental illness (schizoaffective disorder, bipolar type, cannabis and alcohol use disorder) and unclear PMH who was bib ems activated by mother on account of worsening verbal aggression and auditory hallucinations in context of treatment noncompliance.    On initial assessment, patient presents w/ symptoms of psychosis, including prominent and pervasive paranoid/persecutory delusions related to his neighbors, co-workers, and mother in the setting of treatment non-adherence. Paranoia accompanied by agitation, aggression (per collateral provider), violent ideation, and A/H. Patient denies S/I and H/I. Patient's severe paranoia, limited insight, and poor judgment render him an acute risk to himself and others. He would benefit from inpatient hospitalization for safety, stabilization, and medication optimization. Routine observation appropriate. Will start perphenazine 4mg qhs -- a more weight-neutral agent -- due to obese BMI. Of note, patient appears to have gynecomastia (w/ documented history of successful risperidone trial; despite success, would not initiate this agent at this time as patient reports feeling "feminized"). Will add Klonopin 0.5mg BID for psychotic anxiety. Due to patient's stated disinterest in psychotropic medications will hold off on re-starting Depakote, which, per chart, patient had been successful on during previous admission.     2/2 Update  Patient again with agitation when he became frustrated and not being discharged  Remains paranoid and with poor insight and impaired judgement     PLAN  - Admit on 9.27  - Routine observation appropriate  - restart aripiprazole 5 mg daily  - change clonazepam 1mg qhs to PRN reduce polypharmacy  - patient submitted application for court ordered discharge (Court scheduled for 2/7)  - will submit application for MOO (Court scheduled for 2/7)  - No acute medical concerns  - Individual, group, milieu therapy as appropriate  - Obtain collateral information   - Dispo: when stable

## 2023-02-02 NOTE — DIETITIAN INITIAL EVALUATION ADULT - PERTINENT LABORATORY DATA
1/24 HgbA1c 5.8, Cholesterol 224,   A1C with Estimated Average Glucose Result: 5.8 % (01-24-23 @ 08:00)  A1C with Estimated Average Glucose Result: 5.9 % (02-11-22 @ 09:15)

## 2023-02-02 NOTE — BH INPATIENT PSYCHIATRY PROGRESS NOTE - NSBHFUPINTERVALCCFT_PSY_A_CORE
"I AM NOT A DANGER TO MYSELF OR OTHERS..... I REPEAT  I AM NOT A DANGEROUS  MAN AND I AM SOMEONE WHO GOES TO THE POLICE STATION  ...  I WILL REPORT THIS HOSPITAL TO THE STATE FOR VIOLATING MY RIGHTS"

## 2023-02-02 NOTE — BH INPATIENT PSYCHIATRY PROGRESS NOTE - NSBHMETABOLIC_PSY_ALL_CORE_FT
BMI: BMI (kg/m2): 31.8 (01-23-23 @ 15:12)  HbA1c: A1C with Estimated Average Glucose Result: 5.8 % (01-24-23 @ 08:00)    Glucose:   BP: 154/88 (02-02-23 @ 06:28) (138/73 - 154/88)  Lipid Panel: Date/Time: 01-24-23 @ 08:00  Cholesterol, Serum: 224  Direct LDL: --  HDL Cholesterol, Serum: 38  Total Cholesterol/HDL Ration Measurement: --  Triglycerides, Serum: 109

## 2023-02-02 NOTE — DIETITIAN INITIAL EVALUATION ADULT - OTHER INFO
Patient admitted to [facility] for worsening verbal aggression and AH in context of medication noncompliance; hx: schizoaffective disorder, bipolar type; cannabis and alcohol use disorder. Spoke to patient in the day/dining room. Patient states appetite is "fine". Dislikes the hospital food. Food preferences in place. Discussed menu with food preferences updated. Intolerant to milk only. Reports usual weight is 273# and currently is 252#- states weight loss is d/t eating less and walking more at work. Labs: 1/24 HgbA1c 5.8, Cholesterol 224,  reinforced healthy meal plan, portion control and remaining active.

## 2023-02-03 PROCEDURE — 99231 SBSQ HOSP IP/OBS SF/LOW 25: CPT | Mod: GC

## 2023-02-03 RX ORDER — ARIPIPRAZOLE 15 MG/1
5 TABLET ORAL ONCE
Refills: 0 | Status: COMPLETED | OUTPATIENT
Start: 2023-02-03 | End: 2023-02-03

## 2023-02-03 RX ADMIN — ARIPIPRAZOLE 5 MILLIGRAM(S): 15 TABLET ORAL at 13:55

## 2023-02-03 NOTE — BH INPATIENT PSYCHIATRY PROGRESS NOTE - CURRENT MEDICATION
MEDICATIONS  (STANDING):  ARIPiprazole 5 milliGRAM(s) Oral daily  diphenhydrAMINE 50 milliGRAM(s) Oral once    MEDICATIONS  (PRN):  clonazePAM  Tablet 1 milliGRAM(s) Oral at bedtime PRN psychotic anxiety  diphenhydrAMINE 50 milliGRAM(s) Oral every 6 hours PRN EPS  diphenhydrAMINE Injectable 50 milliGRAM(s) IntraMuscular once PRN EPS  haloperidol     Tablet 5 milliGRAM(s) Oral every 6 hours PRN agitation  haloperidol    Injectable 5 milliGRAM(s) IntraMuscular once PRN severe agitation secondary to psychosis  nicotine  Polacrilex Gum 4 milliGRAM(s) Oral five times a day PRN NRT  OLANZapine Injectable 10 milliGRAM(s) IntraMuscular once PRN severe agitation  OLANZapine Injectable 10 milliGRAM(s) IntraMuscular once PRN severe agitation

## 2023-02-03 NOTE — BH INPATIENT PSYCHIATRY PROGRESS NOTE - NSBHFUPINTERVALHXFT_PSY_A_CORE
Patient seen for follow up of psychosis. Chart reviewed and case discussed with treatment team. Did not take yesterday's dose of aripiprazole 5mg qd but requested it at night; did not take morning dose. In fair behavioral control overnight. Sleep poor. Patient says his mood is better today. Believes it might be due to evening aripiprazole. Of note, patient w/ poor sleep. Education provided about activating side effect of aripiprazole. Encouraged to take medication in the morning. Patient then talks at length about unknown people targeting him, the need to protect his family, and the feeling like he is carrying the weight of the world on his shoulders. Patient encouraged to c/w antipsychotic medication. Patient reluctantly agrees. Insight remains poor, which is affecting his judgement and his ability to make a reasoned decision regarding his treatment

## 2023-02-03 NOTE — BH INPATIENT PSYCHIATRY PROGRESS NOTE - NSBHMETABOLIC_PSY_ALL_CORE_FT
BMI: BMI (kg/m2): 31.8 (01-23-23 @ 15:12)  HbA1c: A1C with Estimated Average Glucose Result: 5.8 % (01-24-23 @ 08:00)    Glucose:   BP: 121/72 (02-03-23 @ 06:17) (121/72 - 154/88)  Lipid Panel: Date/Time: 01-24-23 @ 08:00  Cholesterol, Serum: 224  Direct LDL: --  HDL Cholesterol, Serum: 38  Total Cholesterol/HDL Ration Measurement: --  Triglycerides, Serum: 109

## 2023-02-03 NOTE — BH INPATIENT PSYCHIATRY PROGRESS NOTE - NSBHASSESSSUMMFT_PSY_ALL_CORE
Patient is a 23 yo single  male domiciled with mother in [place], employed with [employer], with documented history of mental illness (schizoaffective disorder, bipolar type, cannabis and alcohol use disorder) and unclear PMH who was bib ems activated by mother on account of worsening verbal aggression and auditory hallucinations in context of treatment noncompliance.    On initial assessment, patient presents w/ symptoms of psychosis, including prominent and pervasive paranoid/persecutory delusions related to his neighbors, co-workers, and mother in the setting of treatment non-adherence. Paranoia accompanied by agitation, aggression (per collateral provider), violent ideation, and A/H. Patient denies S/I and H/I. Patient's severe paranoia, limited insight, and poor judgment render him an acute risk to himself and others. He would benefit from inpatient hospitalization for safety, stabilization, and medication optimization. Routine observation appropriate. Will start perphenazine 4mg qhs -- a more weight-neutral agent -- due to obese BMI. Of note, patient appears to have gynecomastia (w/ documented history of successful risperidone trial; despite success, would not initiate this agent at this time as patient reports feeling "feminized"). Will add Klonopin 0.5mg BID for psychotic anxiety. Due to patient's stated disinterest in psychotropic medications will hold off on re-starting Depakote, which, per chart, patient had been successful on during previous admission.     2/2 Update  Patient remains paranoid, w/ limited insight, poor judgment, and tenuous behavioral control. Patient took aripiprazole in the evening, but not AM dose. Reluctantly agrees to take afternoon dose and c/w aripiprazole 5mg. Patient remains unable to make a reasoned decision regarding his treatment.     PLAN  - Admit on 9.27  - Routine observation appropriate  - aripiprazole 5 mg daily  - change clonazepam 1mg qhs to PRN reduce polypharmacy  - patient submitted application for court ordered discharge (Court scheduled for 2/7)  - will submit application for MOO (Court scheduled for 2/7)  - No acute medical concerns  - Individual, group, milieu therapy as appropriate  - Obtain collateral information   - Dispo: when stable Patient is a 23 yo single  male domiciled with mother in [place], employed with [employer], with documented history of mental illness (schizoaffective disorder, bipolar type, cannabis and alcohol use disorder) and unclear PMH who was bib ems activated by mother on account of worsening verbal aggression and auditory hallucinations in context of treatment noncompliance.    On initial assessment, patient presents w/ symptoms of psychosis, including prominent and pervasive paranoid/persecutory delusions related to his neighbors, co-workers, and mother in the setting of treatment non-adherence. Paranoia accompanied by agitation, aggression (per collateral provider), violent ideation, and A/H. Patient denies S/I and H/I. Patient's severe paranoia, limited insight, and poor judgment render him an acute risk to himself and others. He would benefit from inpatient hospitalization for safety, stabilization, and medication optimization. Routine observation appropriate. Will start perphenazine 4mg qhs -- a more weight-neutral agent -- due to obese BMI. Of note, patient appears to have gynecomastia (w/ documented history of successful risperidone trial; despite success, would not initiate this agent at this time as patient reports feeling "feminized"). Will add Klonopin 0.5mg BID for psychotic anxiety. Due to patient's stated disinterest in psychotropic medications will hold off on re-starting Depakote, which, per chart, patient had been successful on during previous admission.     Update  Patient remains paranoid, w/ limited insight, poor judgment, and tenuous behavioral control. Patient took aripiprazole in the evening, but not AM dose. Reluctantly agrees to take afternoon dose and c/w aripiprazole 5mg. Patient remains unable to make a reasoned decision regarding his treatment.     PLAN  - Admit on 9.27  - Routine observation appropriate  - aripiprazole 5 mg daily  - change clonazepam 1mg qhs to PRN reduce polypharmacy  - patient submitted application for court ordered discharge (Court scheduled for 2/7)  - will submit application for MOO (Court scheduled for 2/7)  - No acute medical concerns  - Individual, group, milieu therapy as appropriate  - Obtain collateral information   - Dispo: when stable

## 2023-02-03 NOTE — BH INPATIENT PSYCHIATRY PROGRESS NOTE - MSE UNSTRUCTURED FT
On exam today the patient was again initially superficially cooperative then quickly severely paranoid and verbally aggressive  Speech loud and pressured  Thought process: overall linear  Thought content: with multiple paranoid delusions related to his mother as well as MD's on treatment team   Perception: Denies hearing voices or other perceptual disturbances   Mood: Describes as "better".   Affect: constricted    Patient denies active suicidal ideation, intention and plan.    Patient denies active aggressive/homicidal ideation, intent or plan.   Patient is Alert and oriented .   Fund of knowledge is fair. Memory is intact  Insight and judgment are poor  Impulse control is tenuous at this time.

## 2023-02-04 RX ADMIN — ARIPIPRAZOLE 5 MILLIGRAM(S): 15 TABLET ORAL at 09:31

## 2023-02-05 RX ADMIN — ARIPIPRAZOLE 5 MILLIGRAM(S): 15 TABLET ORAL at 08:09

## 2023-02-06 PROCEDURE — 90853 GROUP PSYCHOTHERAPY: CPT

## 2023-02-06 PROCEDURE — 99231 SBSQ HOSP IP/OBS SF/LOW 25: CPT | Mod: GC

## 2023-02-06 RX ORDER — ARIPIPRAZOLE 15 MG/1
10 TABLET ORAL DAILY
Refills: 0 | Status: DISCONTINUED | OUTPATIENT
Start: 2023-02-06 | End: 2023-02-09

## 2023-02-06 RX ADMIN — ARIPIPRAZOLE 5 MILLIGRAM(S): 15 TABLET ORAL at 08:44

## 2023-02-06 NOTE — BH INPATIENT PSYCHIATRY PROGRESS NOTE - PRN MEDS
MEDICATIONS  (PRN):  diphenhydrAMINE 50 milliGRAM(s) Oral every 6 hours PRN EPS  diphenhydrAMINE Injectable 50 milliGRAM(s) IntraMuscular once PRN EPS  haloperidol     Tablet 5 milliGRAM(s) Oral every 6 hours PRN agitation  haloperidol    Injectable 5 milliGRAM(s) IntraMuscular once PRN severe agitation secondary to psychosis  nicotine  Polacrilex Gum 4 milliGRAM(s) Oral five times a day PRN NRT  OLANZapine Injectable 10 milliGRAM(s) IntraMuscular once PRN severe agitation  OLANZapine Injectable 10 milliGRAM(s) IntraMuscular once PRN severe agitation

## 2023-02-06 NOTE — BH INPATIENT PSYCHIATRY PROGRESS NOTE - NSBHASSESSSUMMFT_PSY_ALL_CORE
Patient is a 23 yo single  male domiciled with mother in [place], employed with [employer], with documented history of mental illness (schizoaffective disorder, bipolar type, cannabis and alcohol use disorder) and unclear PMH who was bib ems activated by mother on account of worsening verbal aggression and auditory hallucinations in context of treatment noncompliance.    On initial assessment, patient presents w/ symptoms of psychosis, including prominent and pervasive paranoid/persecutory delusions related to his neighbors, co-workers, and mother in the setting of treatment non-adherence. Paranoia accompanied by agitation, aggression (per collateral provider), violent ideation, and A/H. Patient denies S/I and H/I. Patient's severe paranoia, limited insight, and poor judgment render him an acute risk to himself and others. He would benefit from inpatient hospitalization for safety, stabilization, and medication optimization. Routine observation appropriate. Will start perphenazine 4mg qhs -- a more weight-neutral agent -- due to obese BMI. Of note, patient appears to have gynecomastia (w/ documented history of successful risperidone trial; despite success, would not initiate this agent at this time as patient reports feeling "feminized"). Will add Klonopin 0.5mg BID for psychotic anxiety. Due to patient's stated disinterest in psychotropic medications will hold off on re-starting Depakote, which, per chart, patient had been successful on during previous admission.     Patient calmer today. Took aripiprazole over the weekend. Says medication is helping w/ mood. However, remains reluctant to take dose at therapeutic level. Tenuous behavioral control. Insight into utility of treatment remains limited. He remains unable to make a reasoned decision.     PLAN  - Admit on 9.27  - Routine observation appropriate  - increase aripiprazole to 10 mg daily  - change clonazepam 1mg qhs to PRN reduce polypharmacy  - patient submitted application for court ordered discharge (Court scheduled for 2/7)  - will submit application for MOO (Court scheduled for 2/7)  - No acute medical concerns  - Individual, group, milieu therapy as appropriate  - Obtain collateral information   - Dispo: when stable

## 2023-02-06 NOTE — BH INPATIENT PSYCHIATRY PROGRESS NOTE - MSE UNSTRUCTURED FT
On exam today the patient was again initially superficially cooperative then quickly severely paranoid and verbally aggressive  Speech loud and pressured  Thought process: overall linear  Thought content: with multiple paranoid delusions related to his mother as well as MD's on treatment team   Perception: Denies hearing voices or other perceptual disturbances   Mood: Describes as "calkm".   Affect: constricted    Patient denies active suicidal ideation, intention and plan.    Patient denies active aggressive/homicidal ideation, intent or plan.   Patient is Alert and oriented .   Fund of knowledge is fair. Memory is intact  Insight and judgment are poor  Impulse control is tenuous at this time.       On exam today the patient was again initially superficially cooperative then quickly severely paranoid and verbally aggressive  Speech loud and pressured  Thought process: overall linear  Thought content: with multiple paranoid delusions related to his mother as well as MD's on treatment team   Perception: Denies hearing voices or other perceptual disturbances   Mood: Describes as "calm".   Affect: constricted    Patient denies active suicidal ideation, intention and plan.    Patient denies active aggressive/homicidal ideation, intent or plan.   Patient is Alert and oriented .   Fund of knowledge is fair. Memory is intact  Insight and judgment are poor  Impulse control is tenuous at this time.

## 2023-02-06 NOTE — BH PSYCHOLOGY - GROUP THERAPY NOTE - PROVIDER ATTESTATION
I was present with the psychology trainee during the critical/key portions of the visit. I agree with the findings and plan as documented in the psychology trainee note unless noted below.

## 2023-02-06 NOTE — BH PSYCHOLOGY - GROUP THERAPY NOTE - NSBHPSYCHOLPARTICIPCOMMENT_PSY_A_CORE FT
Pt came to group on time, was alert, and fully engaged. Today’s discussion focused on feeling overwhelmed about life transitions, not aligning with former life goals, not knowing what to do next in life following treatment, how to find motivation and meaning as well as, on challenges of finding self-compassion through this process. Group members were able to provide one another with support, feedback, and coping skills to utilize while drawing from their personal experiences. Pt was supportive of others in group and rehana from his past adversarial experiences to provide others with support and potential coping mechanisms that have worked for him.

## 2023-02-06 NOTE — BH INPATIENT PSYCHIATRY PROGRESS NOTE - NSBHMETABOLIC_PSY_ALL_CORE_FT
BMI: BMI (kg/m2): 31.8 (01-23-23 @ 15:12)  HbA1c: A1C with Estimated Average Glucose Result: 5.8 % (01-24-23 @ 08:00)    Glucose:   BP: 135/79 (02-06-23 @ 07:04) (135/79 - 141/77)  Lipid Panel: Date/Time: 01-24-23 @ 08:00  Cholesterol, Serum: 224  Direct LDL: --  HDL Cholesterol, Serum: 38  Total Cholesterol/HDL Ration Measurement: --  Triglycerides, Serum: 109

## 2023-02-06 NOTE — BH INPATIENT PSYCHIATRY PROGRESS NOTE - NSBHFUPINTERVALHXFT_PSY_A_CORE
Patient seen for follow up of psychosis. Chart reviewed and case discussed with treatment team Adherent w/ medications over weekend. In fair behavioral control overnight. Slept well. Reports uneventful weekend. Says he feels increasingly calm w/ the medication. Reports he does not "crescendo" as much when he gets angry. Says w/ the medication "the neighbors won't be a problem." Believes he won't be as easily agitated if he takes the medication. Increase in dose discussed. States he will consider increase to aripiprazole 10mg. Feels safe on the unit. No medication side effects. Insight into utility of treatment remains limited, impacting his ability to make a reasoned decision.

## 2023-02-06 NOTE — BH INPATIENT PSYCHIATRY PROGRESS NOTE - CURRENT MEDICATION
MEDICATIONS  (STANDING):  ARIPiprazole 10 milliGRAM(s) Oral daily  diphenhydrAMINE 50 milliGRAM(s) Oral once    MEDICATIONS  (PRN):  diphenhydrAMINE 50 milliGRAM(s) Oral every 6 hours PRN EPS  diphenhydrAMINE Injectable 50 milliGRAM(s) IntraMuscular once PRN EPS  haloperidol     Tablet 5 milliGRAM(s) Oral every 6 hours PRN agitation  haloperidol    Injectable 5 milliGRAM(s) IntraMuscular once PRN severe agitation secondary to psychosis  nicotine  Polacrilex Gum 4 milliGRAM(s) Oral five times a day PRN NRT  OLANZapine Injectable 10 milliGRAM(s) IntraMuscular once PRN severe agitation  OLANZapine Injectable 10 milliGRAM(s) IntraMuscular once PRN severe agitation

## 2023-02-07 PROCEDURE — 90837 PSYTX W PT 60 MINUTES: CPT

## 2023-02-07 RX ORDER — CLONAZEPAM 1 MG
1 TABLET ORAL AT BEDTIME
Refills: 0 | Status: DISCONTINUED | OUTPATIENT
Start: 2023-02-07 | End: 2023-02-14

## 2023-02-07 RX ADMIN — ARIPIPRAZOLE 10 MILLIGRAM(S): 15 TABLET ORAL at 08:39

## 2023-02-07 NOTE — BH INPATIENT PSYCHIATRY PROGRESS NOTE - NSBHASSESSSUMMFT_PSY_ALL_CORE
Patient is a 23 yo single  male domiciled with mother in [place], employed with [employer], with documented history of mental illness (schizoaffective disorder, bipolar type, cannabis and alcohol use disorder) and unclear PMH who was bib ems activated by mother on account of worsening verbal aggression and auditory hallucinations in context of treatment noncompliance.    On initial assessment, patient presents w/ symptoms of psychosis, including prominent and pervasive paranoid/persecutory delusions related to his neighbors, co-workers, and mother in the setting of treatment non-adherence. Paranoia accompanied by agitation, aggression (per collateral provider), violent ideation, and A/H. Patient denies S/I and H/I. Patient's severe paranoia, limited insight, and poor judgment render him an acute risk to himself and others. He would benefit from inpatient hospitalization for safety, stabilization, and medication optimization. Routine observation appropriate. Will start perphenazine 4mg qhs -- a more weight-neutral agent -- due to obese BMI. Of note, patient appears to have gynecomastia (w/ documented history of successful risperidone trial; despite success, would not initiate this agent at this time as patient reports feeling "feminized"). Will add Klonopin 0.5mg BID for psychotic anxiety. Due to patient's stated disinterest in psychotropic medications will hold off on re-starting Depakote, which, per chart, patient had been successful on during previous admission.     Patient calm yet remains paranoid. Acknowledges paranoia/agitation put him in situations that are potentially dangerous for himself. Willing to take higher dose of aripiprazole but wants discharge. Insight into utility of treatment remains limited. Court scheduled for today.     PLAN  - Admit on 9.27  - Routine observation appropriate  - continue aripiprazole 10 mg daily  - change clonazepam 1mg qhs to PRN reduce polypharmacy  - patient submitted application for court ordered discharge (Court scheduled for 2/7)  - will submit application for MOO (Court scheduled for 2/7)  - No acute medical concerns  - Individual, group, milieu therapy as appropriate  - Obtain collateral information   - Dispo: when stable

## 2023-02-07 NOTE — BH INPATIENT PSYCHIATRY PROGRESS NOTE - MSE UNSTRUCTURED FT
On exam today the patient was again initially superficially cooperative then quickly severely paranoid and verbally aggressive  Speech loud and pressured  Thought process: overall linear  Thought content: with multiple paranoid delusions related to his mother as well as MD's on treatment team   Perception: Denies hearing voices or other perceptual disturbances   Mood: Describes as "bit better".   Affect: constricted    Patient denies active suicidal ideation, intention and plan.    Patient denies active aggressive/homicidal ideation, intent or plan.   Patient is Alert and oriented .   Fund of knowledge is fair. Memory is intact  Insight and judgment are poor  Impulse control is tenuous at this time.

## 2023-02-07 NOTE — BH INPATIENT PSYCHIATRY PROGRESS NOTE - PRN MEDS
MEDICATIONS  (PRN):  clonazePAM  Tablet 1 milliGRAM(s) Oral at bedtime PRN psychotic anxiety  diphenhydrAMINE 50 milliGRAM(s) Oral every 6 hours PRN EPS  diphenhydrAMINE Injectable 50 milliGRAM(s) IntraMuscular once PRN EPS  haloperidol     Tablet 5 milliGRAM(s) Oral every 6 hours PRN agitation  haloperidol    Injectable 5 milliGRAM(s) IntraMuscular once PRN severe agitation secondary to psychosis  LORazepam     Tablet 2 milliGRAM(s) Oral every 6 hours PRN anxiety  LORazepam   Injectable 2 milliGRAM(s) IntraMuscular once PRN severe agitation  nicotine  Polacrilex Gum 4 milliGRAM(s) Oral five times a day PRN NRT  OLANZapine Injectable 10 milliGRAM(s) IntraMuscular once PRN severe agitation  OLANZapine Injectable 10 milliGRAM(s) IntraMuscular once PRN severe agitation

## 2023-02-07 NOTE — BH INPATIENT PSYCHIATRY PROGRESS NOTE - NSBHFUPINTERVALHXFT_PSY_A_CORE
Patient seen for follow up of psychosis. Chart reviewed and case discussed with treatment team. Adherent w/ medications, good sleep, in good behavioral control overnight. Patient calm yet paranoid. Often vague. Reports fair mood. Says sleep was restless. Discusses variety of threats from outside world. Court case discussed. Patient acknowledges that his behavior sometimes puts him in potentially dangerous situations. Reports willingness to take increased dose of aripiprazole. Says it helps "lower the crescendo." However, continues to request discharge. No medication side effects. Court scheduled for this AM.

## 2023-02-07 NOTE — BH INPATIENT PSYCHIATRY PROGRESS NOTE - NSBHMETABOLIC_PSY_ALL_CORE_FT
BMI: BMI (kg/m2): 31.8 (01-23-23 @ 15:12)  HbA1c: A1C with Estimated Average Glucose Result: 5.8 % (01-24-23 @ 08:00)    Glucose:   BP: 144/93 (02-07-23 @ 07:42) (135/79 - 144/93)  Lipid Panel: Date/Time: 01-24-23 @ 08:00  Cholesterol, Serum: 224  Direct LDL: --  HDL Cholesterol, Serum: 38  Total Cholesterol/HDL Ration Measurement: --  Triglycerides, Serum: 109

## 2023-02-07 NOTE — BH INPATIENT PSYCHIATRY PROGRESS NOTE - CURRENT MEDICATION
MEDICATIONS  (STANDING):  ARIPiprazole 10 milliGRAM(s) Oral daily  diphenhydrAMINE 50 milliGRAM(s) Oral once    MEDICATIONS  (PRN):  clonazePAM  Tablet 1 milliGRAM(s) Oral at bedtime PRN psychotic anxiety  diphenhydrAMINE 50 milliGRAM(s) Oral every 6 hours PRN EPS  diphenhydrAMINE Injectable 50 milliGRAM(s) IntraMuscular once PRN EPS  haloperidol     Tablet 5 milliGRAM(s) Oral every 6 hours PRN agitation  haloperidol    Injectable 5 milliGRAM(s) IntraMuscular once PRN severe agitation secondary to psychosis  LORazepam     Tablet 2 milliGRAM(s) Oral every 6 hours PRN anxiety  LORazepam   Injectable 2 milliGRAM(s) IntraMuscular once PRN severe agitation  nicotine  Polacrilex Gum 4 milliGRAM(s) Oral five times a day PRN NRT  OLANZapine Injectable 10 milliGRAM(s) IntraMuscular once PRN severe agitation  OLANZapine Injectable 10 milliGRAM(s) IntraMuscular once PRN severe agitation

## 2023-02-08 PROCEDURE — 90834 PSYTX W PT 45 MINUTES: CPT

## 2023-02-08 PROCEDURE — 99231 SBSQ HOSP IP/OBS SF/LOW 25: CPT | Mod: GC

## 2023-02-08 RX ADMIN — ARIPIPRAZOLE 10 MILLIGRAM(S): 15 TABLET ORAL at 08:26

## 2023-02-08 NOTE — BH INPATIENT PSYCHIATRY PROGRESS NOTE - NSBHASSESSSUMMFT_PSY_ALL_CORE
Patient is a 23 yo single  male domiciled with mother in [place], employed with [employer], with documented history of mental illness (schizoaffective disorder, bipolar type, cannabis and alcohol use disorder) and unclear PMH who was bib ems activated by mother on account of worsening verbal aggression and auditory hallucinations in context of treatment noncompliance.    On initial assessment, patient presents w/ symptoms of psychosis, including prominent and pervasive paranoid/persecutory delusions related to his neighbors, co-workers, and mother in the setting of treatment non-adherence. Paranoia accompanied by agitation, aggression (per collateral provider), violent ideation, and A/H. Patient denies S/I and H/I. Patient's severe paranoia, limited insight, and poor judgment render him an acute risk to himself and others. He would benefit from inpatient hospitalization for safety, stabilization, and medication optimization. Routine observation appropriate. Will start perphenazine 4mg qhs -- a more weight-neutral agent -- due to obese BMI. Of note, patient appears to have gynecomastia (w/ documented history of successful risperidone trial; despite success, would not initiate this agent at this time as patient reports feeling "feminized"). Will add Klonopin 0.5mg BID for psychotic anxiety. Due to patient's stated disinterest in psychotropic medications will hold off on re-starting Depakote, which, per chart, patient had been successful on during previous admission.     Patient presents as vague, paranoid, difficult to follow at times. Speech loud and pressured. Describes litany of threats. Remains reluctant about medications but willing to work w/ treatment team. In fair behavioral control. Adherent w/ aripiprazole 10mg. Will c/w aripiprazole titration. TOO granted 2/7.     PLAN  - Admit on 9.27  - Routine observation appropriate  - continue aripiprazole 10 mg daily  - change clonazepam 1mg qhs to PRN reduce polypharmacy  - TOO granted 2/7  - No acute medical concerns  - Individual, group, milieu therapy as appropriate  - Obtain collateral information   - Dispo: when stable

## 2023-02-08 NOTE — BH INPATIENT PSYCHIATRY PROGRESS NOTE - MSE UNSTRUCTURED FT
On exam today the patient was again initially superficially cooperative then quickly severely paranoid   Speech loud and pressured  Thought process: overall linear  Thought content: with multiple paranoid delusions related to his mother as well as MD's on treatment team   Perception: Denies hearing voices or other perceptual disturbances   Mood: Describes as "fine".   Affect: constricted    Patient denies active suicidal ideation, intention and plan.    Patient denies active aggressive/homicidal ideation, intent or plan.   Patient is Alert and oriented .   Fund of knowledge is fair. Memory is intact  Insight and judgment are poor  Impulse control is tenuous at this time.

## 2023-02-08 NOTE — BH INPATIENT PSYCHIATRY PROGRESS NOTE - NSBHMETABOLIC_PSY_ALL_CORE_FT
BMI: BMI (kg/m2): 31.8 (01-23-23 @ 15:12)  HbA1c: A1C with Estimated Average Glucose Result: 5.8 % (01-24-23 @ 08:00)    Glucose:   BP: 140/88 (02-08-23 @ 07:16) (135/79 - 144/93)  Lipid Panel: Date/Time: 01-24-23 @ 08:00  Cholesterol, Serum: 224  Direct LDL: --  HDL Cholesterol, Serum: 38  Total Cholesterol/HDL Ration Measurement: --  Triglycerides, Serum: 109

## 2023-02-08 NOTE — BH INPATIENT PSYCHIATRY PROGRESS NOTE - NSBHFUPINTERVALHXFT_PSY_A_CORE
Patient seen for follow up of psychosis. Chart reviewed and case discussed with treatment team. Adherent w/ medications, good sleep, in good behavioral control overnight. Application for TOO successful. Patient presents as vague, paranoid, difficult to follow at times. Reports fair mood, however, believes medication is making him feel a variety of emotions: fear, sadness, cowardice. Patient then discusses a number of threats, including the neighbors, his mother, some unknown threat he refuses to disclose. Says there is a "war at home," but does not elaborate. Talks about life goals, including retiring by age 35. Amenable to cooperate w/ treatment team moving forward.

## 2023-02-09 PROCEDURE — 90834 PSYTX W PT 45 MINUTES: CPT

## 2023-02-09 PROCEDURE — 99231 SBSQ HOSP IP/OBS SF/LOW 25: CPT | Mod: GC

## 2023-02-09 RX ORDER — ARIPIPRAZOLE 15 MG/1
15 TABLET ORAL DAILY
Refills: 0 | Status: DISCONTINUED | OUTPATIENT
Start: 2023-02-09 | End: 2023-02-13

## 2023-02-09 RX ADMIN — ARIPIPRAZOLE 10 MILLIGRAM(S): 15 TABLET ORAL at 08:27

## 2023-02-09 NOTE — BH INPATIENT PSYCHIATRY PROGRESS NOTE - NSBHASSESSSUMMFT_PSY_ALL_CORE
Patient is a 21 yo single  male domiciled with mother in [place], employed with [employer], with documented history of mental illness (schizoaffective disorder, bipolar type, cannabis and alcohol use disorder) and unclear PMH who was bib ems activated by mother on account of worsening verbal aggression and auditory hallucinations in context of treatment noncompliance.    On initial assessment, patient presents w/ symptoms of psychosis, including prominent and pervasive paranoid/persecutory delusions related to his neighbors, co-workers, and mother in the setting of treatment non-adherence. Paranoia accompanied by agitation, aggression (per collateral provider), violent ideation, and A/H. Patient denies S/I and H/I. Patient's severe paranoia, limited insight, and poor judgment render him an acute risk to himself and others. He would benefit from inpatient hospitalization for safety, stabilization, and medication optimization. Routine observation appropriate. Will start perphenazine 4mg qhs -- a more weight-neutral agent -- due to obese BMI. Of note, patient appears to have gynecomastia (w/ documented history of successful risperidone trial; despite success, would not initiate this agent at this time as patient reports feeling "feminized"). Will add Klonopin 0.5mg BID for psychotic anxiety. Due to patient's stated disinterest in psychotropic medications will hold off on re-starting Depakote, which, per chart, patient had been successful on during previous admission.     Patient superficially cooperative, dysphoric appearing today. Continues to be vague at times. Remains paranoid although believes he is better able to distract self from paranoid thoughts. In fair behavioral control. Sleep poor. Adherent w/ aripiprazole 10mg. Will increase to 15mg.     PLAN  - Admit on 9.27  - Routine observation appropriate  - increase aripiprazole to 15 mg daily  - change clonazepam 1mg qhs to PRN reduce polypharmacy  - TOO granted 2/7  - No acute medical concerns  - Individual, group, milieu therapy as appropriate  - Obtain collateral information   - Dispo: when stable

## 2023-02-09 NOTE — BH INPATIENT PSYCHIATRY PROGRESS NOTE - CURRENT MEDICATION
MEDICATIONS  (STANDING):  ARIPiprazole 15 milliGRAM(s) Oral daily  diphenhydrAMINE 50 milliGRAM(s) Oral once    MEDICATIONS  (PRN):  clonazePAM  Tablet 1 milliGRAM(s) Oral at bedtime PRN psychotic anxiety  diphenhydrAMINE 50 milliGRAM(s) Oral every 6 hours PRN EPS  diphenhydrAMINE Injectable 50 milliGRAM(s) IntraMuscular once PRN EPS  haloperidol     Tablet 5 milliGRAM(s) Oral every 6 hours PRN agitation  haloperidol    Injectable 5 milliGRAM(s) IntraMuscular once PRN severe agitation secondary to psychosis  LORazepam     Tablet 2 milliGRAM(s) Oral every 6 hours PRN anxiety  LORazepam   Injectable 2 milliGRAM(s) IntraMuscular once PRN severe agitation  nicotine  Polacrilex Gum 4 milliGRAM(s) Oral five times a day PRN NRT  OLANZapine Injectable 10 milliGRAM(s) IntraMuscular once PRN severe agitation  OLANZapine Injectable 10 milliGRAM(s) IntraMuscular once PRN severe agitation

## 2023-02-09 NOTE — BH INPATIENT PSYCHIATRY PROGRESS NOTE - MSE UNSTRUCTURED FT
On exam today the patient was again superficially cooperative, depressed-appearing  Speech loud and pressured  Thought process: overall linear  Thought content: with multiple paranoid delusions related to his mother as well as MD's on treatment team   Perception: Denies hearing voices or other perceptual disturbances   Mood: Describes as "fine".   Affect: dysphoric, constricted    Patient denies active suicidal ideation, intention and plan.    Patient denies active aggressive/homicidal ideation, intent or plan.   Patient is Alert and oriented .   Fund of knowledge is fair. Memory is intact  Insight and judgment are poor  Impulse control is tenuous at this time.

## 2023-02-09 NOTE — BH INPATIENT PSYCHIATRY PROGRESS NOTE - NSBHFUPINTERVALHXFT_PSY_A_CORE
Patient seen for follow up of psychosis. Chart reviewed and case discussed with treatment team. Adherent w/ medications, poor sleep, in good behavioral control overnight. Reports fair mood although appears depressed, dejected. Worried about work; requests letter documenting his hospital stay. Superficially cooperative; talks in vague terms about paranoia. Believes he is better able to distract self from paranoid thoughts. Amenable to increase in aripiprazole. Does not want medication for sleep.

## 2023-02-09 NOTE — BH INPATIENT PSYCHIATRY PROGRESS NOTE - NSBHMETABOLIC_PSY_ALL_CORE_FT
BMI: BMI (kg/m2): 31.8 (01-23-23 @ 15:12)  HbA1c: A1C with Estimated Average Glucose Result: 5.8 % (01-24-23 @ 08:00)    Glucose:   BP: 143/82 (02-09-23 @ 08:14) (140/88 - 144/93)  Lipid Panel: Date/Time: 01-24-23 @ 08:00  Cholesterol, Serum: 224  Direct LDL: --  HDL Cholesterol, Serum: 38  Total Cholesterol/HDL Ration Measurement: --  Triglycerides, Serum: 109

## 2023-02-10 PROCEDURE — 99231 SBSQ HOSP IP/OBS SF/LOW 25: CPT | Mod: GC,25

## 2023-02-10 PROCEDURE — 90853 GROUP PSYCHOTHERAPY: CPT

## 2023-02-10 RX ORDER — ARIPIPRAZOLE 15 MG/1
400 TABLET ORAL ONCE
Refills: 0 | Status: DISCONTINUED | OUTPATIENT
Start: 2023-02-10 | End: 2023-02-10

## 2023-02-10 RX ORDER — ARIPIPRAZOLE 15 MG/1
882 TABLET ORAL ONCE
Refills: 0 | Status: COMPLETED | OUTPATIENT
Start: 2023-02-10 | End: 2023-02-10

## 2023-02-10 RX ADMIN — ARIPIPRAZOLE 882 MILLIGRAM(S): 15 TABLET ORAL at 17:15

## 2023-02-10 RX ADMIN — Medication 50 MILLIGRAM(S): at 01:21

## 2023-02-10 RX ADMIN — ARIPIPRAZOLE 15 MILLIGRAM(S): 15 TABLET ORAL at 08:46

## 2023-02-10 NOTE — BH INPATIENT PSYCHIATRY PROGRESS NOTE - NSBHASSESSSUMMFT_PSY_ALL_CORE
Patient is a 23 yo single  male domiciled with mother in [place], employed with [employer], with documented history of mental illness (schizoaffective disorder, bipolar type, cannabis and alcohol use disorder) and unclear PMH who was bib ems activated by mother on account of worsening verbal aggression and auditory hallucinations in context of treatment noncompliance.    On initial assessment, patient presents w/ symptoms of psychosis, including prominent and pervasive paranoid/persecutory delusions related to his neighbors, co-workers, and mother in the setting of treatment non-adherence. Paranoia accompanied by agitation, aggression (per collateral provider), violent ideation, and A/H. Patient denies S/I and H/I. Patient's severe paranoia, limited insight, and poor judgment render him an acute risk to himself and others. He would benefit from inpatient hospitalization for safety, stabilization, and medication optimization. Routine observation appropriate. Will start perphenazine 4mg qhs -- a more weight-neutral agent -- due to obese BMI. Of note, patient appears to have gynecomastia (w/ documented history of successful risperidone trial; despite success, would not initiate this agent at this time as patient reports feeling "feminized"). Will add Klonopin 0.5mg BID for psychotic anxiety. Due to patient's stated disinterest in psychotropic medications will hold off on re-starting Depakote, which, per chart, patient had been successful on during previous admission.     Patient remain superficially cooperative minimally engaged. No overt psychosis elicited but paranoia likely remains. In fair behavioral control. Sleep poor. Adherent w/ aripiprazole. Amenable to take DELONG. Will determine if covered and then offer.     PLAN  - Admit on 9.27  - Routine observation appropriate  - c/w aripiprazole 15 mg daily  - Abilify Maintena 400mg pending approval  - change clonazepam 1mg qhs to PRN reduce polypharmacy  - TOO granted 2/7  - No acute medical concerns  - Individual, group, milieu therapy as appropriate  - Obtain collateral information   - Dispo: when stable

## 2023-02-10 NOTE — BH TREATMENT PLAN - NSTXPSYCHOINTERMD_PSY_ALL_CORE
antipsychotic trial and reality testing 

## 2023-02-10 NOTE — BH PSYCHOLOGY - GROUP THERAPY NOTE - NSPSYCHOLGRPCOGSPCH_PSY_A_CORE FT
Clear, coherent and of normal pace and volume
within normal limitations 
WNL
WNL
Clear, coherent and of normal pace and volume

## 2023-02-10 NOTE — BH SCALES AND SCREENS - NSBPRSSUSPIC_PSY_ALL_CORE
7 = Very Severe – as above, but more widespread, frequent, or intense
7 = Very Severe – as above, but more widespread, frequent, or intense

## 2023-02-10 NOTE — BH PSYCHOLOGY - GROUP THERAPY NOTE - NSPSYCHOLGRPCOGPROB_PSY_A_CORE
other...
high reactivity to psychosocial stressor/impaired problem solving skills
high reactivity to psychosocial stressor/impaired problem solving skills
other...
high reactivity to psychosocial stressor

## 2023-02-10 NOTE — BH INPATIENT PSYCHIATRY PROGRESS NOTE - NSBHFUPINTERVALHXFT_PSY_A_CORE
Patient seen for follow up of psychosis. Chart reviewed and case discussed with treatment team. Adherent w/ medications, poor sleep, in good behavioral control overnight. Reports fair mood. Did not sleep well; says it was cold overnight and he woke up feeling jittery. Denies daytime jitteriness. No overt psychosis but paranoia likely remains. Patient would like to titrate meds as quickly as possible to expedite discharge. DELONG discussed w/ patient; amenable to take injection today w/ oral overlap.

## 2023-02-10 NOTE — BH INPATIENT PSYCHIATRY PROGRESS NOTE - NSBHMETABOLIC_PSY_ALL_CORE_FT
BMI: BMI (kg/m2): 31.8 (01-23-23 @ 15:12)  HbA1c: A1C with Estimated Average Glucose Result: 5.8 % (01-24-23 @ 08:00)    Glucose:   BP: 143/82 (02-09-23 @ 08:14) (140/88 - 143/82)  Lipid Panel: Date/Time: 01-24-23 @ 08:00  Cholesterol, Serum: 224  Direct LDL: --  HDL Cholesterol, Serum: 38  Total Cholesterol/HDL Ration Measurement: --  Triglycerides, Serum: 109

## 2023-02-10 NOTE — BH SCALES AND SCREENS - NSBPRSUNUTHOCON_PSY_ALL_CORE
7 = Very Severe – delusion(s) has major impact, e.g. stops eating because believes food is poisoned
7 = Very Severe – delusion(s) has major impact, e.g. stops eating because believes food is poisoned

## 2023-02-10 NOTE — BH PSYCHOLOGY - GROUP THERAPY NOTE - NSBHPSYCHOLPARTICIPCOMMENT_PSY_A_CORE FT
Patient attended Psychology Group. Group Discussion centered around the topics of finding balance and the limitations of dichotomous thinking. Patient attempted to share personal examples related to the group discussion topic but experienced some difficulty doing so. His thought process was tangential and circumstantial with loose associations. He required frequent interruption and redirection. He was not resistant to redirection; however, continued to have difficulty staying on topic. Patient did share that he struggles to hold middle ground perspectives and compromise.

## 2023-02-10 NOTE — BH SCALES AND SCREENS - NSBPRSCONCDISORG_PSY_ALL_CORE
3 = Mild - e.g., frequently vague, but the interview is able to progress smoothly
3 = Mild - e.g., frequently vague, but the interview is able to progress smoothly

## 2023-02-10 NOTE — BH TREATMENT PLAN - NSDCCRITERIA_PSY_ALL_CORE
improvement in psychotic symptoms and stable for discharge with a CGI Improvement score =2

## 2023-02-10 NOTE — BH INPATIENT PSYCHIATRY PROGRESS NOTE - MSE UNSTRUCTURED FT
On exam today the patient was again superficially cooperative, depressed-appearing  Speech loud and pressured  Thought process: overall linear  Thought content: with multiple paranoid delusions related to his mother as well as MD's on treatment team   Perception: Denies hearing voices or other perceptual disturbances   Mood: Describes as "okay".   Affect: dysphoric, constricted    Patient denies active suicidal ideation, intention and plan.    Patient denies active aggressive/homicidal ideation, intent or plan.   Patient is Alert and oriented .   Fund of knowledge is fair. Memory is intact  Insight and judgment are poor  Impulse control is tenuous at this time.

## 2023-02-10 NOTE — BH TREATMENT PLAN - NSTXCAREGIVERAGREEMENT_PSY_P_CORE
Yes
Patient does not have family/caregiver involved in care
Patient does not have family/caregiver involved in care

## 2023-02-10 NOTE — BH INPATIENT PSYCHIATRY PROGRESS NOTE - CURRENT MEDICATION
MEDICATIONS  (STANDING):  ARIPiprazole 15 milliGRAM(s) Oral daily    MEDICATIONS  (PRN):  clonazePAM  Tablet 1 milliGRAM(s) Oral at bedtime PRN psychotic anxiety  diphenhydrAMINE 50 milliGRAM(s) Oral every 6 hours PRN EPS  diphenhydrAMINE Injectable 50 milliGRAM(s) IntraMuscular once PRN EPS  haloperidol     Tablet 5 milliGRAM(s) Oral every 6 hours PRN agitation  haloperidol    Injectable 5 milliGRAM(s) IntraMuscular once PRN severe agitation secondary to psychosis  LORazepam     Tablet 2 milliGRAM(s) Oral every 6 hours PRN anxiety  LORazepam   Injectable 2 milliGRAM(s) IntraMuscular once PRN severe agitation  nicotine  Polacrilex Gum 4 milliGRAM(s) Oral five times a day PRN NRT  OLANZapine Injectable 10 milliGRAM(s) IntraMuscular once PRN severe agitation  OLANZapine Injectable 10 milliGRAM(s) IntraMuscular once PRN severe agitation

## 2023-02-10 NOTE — BH PSYCHOLOGY - GROUP THERAPY NOTE - TOKEN PULL-DIAGNOSIS
Primary Diagnosis:  Schizophrenia [F20.9]      Schizoaffective disorder, bipolar type [F25.0]        Problem Dx:   Delusional disorder, persecutory type [F22]      Schizoaffective disorder, bipolar type [F25.0]      

## 2023-02-10 NOTE — BH PSYCHOLOGY - GROUP THERAPY NOTE - NSBHPSYCHOLASSESSPROV_PSY_A_CORE
Licensed Psychologist and Psychology Trainee
Licensed Psychologist
Licensed Psychologist

## 2023-02-10 NOTE — BH TREATMENT PLAN - NSCMSPTSTRENGTHS_PSY_ALL_CORE
Assertive/Intelligence
Assertive/Expressive of emotions/Intact employment/Intelligence/Interpersonal skills
Assertive/Intelligence

## 2023-02-10 NOTE — BH PSYCHOLOGY - GROUP THERAPY NOTE - NSPSYCHOLGRPBILLING_PSY_A_CORE
[id number] - Group Psychotherapy
[id number] - Group Psychotherapy
[id number] - Group Psychotherapy
[id number] - Group Psychotherapy
[id number] - Group Psychotherapy

## 2023-02-10 NOTE — BH TREATMENT PLAN - NSTXCAREGIVERPARTICIPATE_PSY_P_CORE
No, patient unwilling to involve family/caregiver
No, patient unwilling to involve family/caregiver
Family/Caregiver participated in identification of needs/problems/goals for treatment

## 2023-02-10 NOTE — BH PSYCHOLOGY - GROUP THERAPY NOTE - NSPSYCHOLGRPCOGPT_PSY_A_CORE FT
Patient attended Cognitive Behavioral Therapy Group. Acceptance and Commitment Therapy principles, concepts, skills and techniques were also utilized in the group. The group started with group rules, introductions and brief individual check-ins. The topics discussed in group were: Finding Balance and Dichotomous Thinking.  facilitated the group process, as well as provided support and psychoeducation. 
Patient attended Cognitive Behavioral Therapy Group. Acceptance and Commitment Therapy principles, concepts, skills and techniques were also utilized in the group. The group started with group rules, introductions and brief individual check-ins. The topics discussed in group were: Self-Esteem and Emotions.  facilitated the group process, as well as provided support and psychoeducation.

## 2023-02-10 NOTE — BH PSYCHOLOGY - GROUP THERAPY NOTE - NSPSYCHOLGRPCOGINT_PSY_A_CORE
acceptance skills taught/explore reducing vulnerability to stress/mindfulness skills taught
other..
other..
cognitive restructuring
group members provided support/group members suggested positive behaviors/explore reducing vulnerability to stress

## 2023-02-10 NOTE — BH PSYCHOLOGY - GROUP THERAPY NOTE - NSPSYCHOLGRPCOGGOAL_PSY_A_CORE
other...
reduce reaction to psychosocial stressors
other...
reduce reaction to psychosocial stressors/develop improved problem solving skills/prevent relapse of symptoms
reduce reaction to psychosocial stressors/develop improved problem solving skills

## 2023-02-10 NOTE — BH SCALES AND SCREENS - NSBHSCALESCRN_PSY_ALL_CORE
[FreeTextEntry1] : for Crohns, gerd, anemia
Brief Psychiatric Rating Scale (BPRS)
Brief Psychiatric Rating Scale (BPRS)

## 2023-02-10 NOTE — BH TREATMENT PLAN - NSTXPLANTHERAPYSESSIONSFT_PSY_ALL_CORE
01-30-23  Type of therapy: Coping skills,Leisure development,Music therapy,Peer advocate,Spirituality,Stress management  Type of session: Individual  Level of patient participation: Resistance to participation  Duration of participation: 15 minutes  Therapy conducted by: Psych rehab  Therapy Summary: Writer met pt in order to review progress into his psych rehab goal. Pt was somewhat irritable upon approach. Pt was minimizing his sxs and pushing for discharge during the meeting. Pt stated that his spirit is disturbed on the unit and can not sleep well at night. Pt refused to elaborate it further when writer asked pt why his spirit is being disturbed. Writer attempted to explore coping strategies with pt in order to better cope with hospitalization. Pt was minimally receptive instead of focusing on discharge. Pt denied SI/HI/AH/VH at this time. Over the past week, pt has been partially compliant with medications. Pt demonstrates limited insight into his sxs and needs of treatment. Pt has been in fair behavioral control. Pt is minimally visible and social on the unit. Pt is minimally receptive to skill development. Pt attended 20% of psych rehab groups. Pt was quiet in groups but was able to appropriately participate in group activities and discussions. Writer will continue to encourage pt to engage in treatment in order to better develop coping skills and for continued support.  
  02-06-23  Type of therapy: Coping skills,Creative arts therapy,Inspiration and motiviation,Leisure development,Peer advocate,Spirituality,Stress management,Symptom management  Type of session: Individual  Level of patient participation: Participated with encouragement  Duration of participation: 15 minutes  Therapy conducted by: Psych rehab  Therapy Summary: Writer met pt to review progress into his psych rehab goal. Pt was calm and cooperative upon approach. Although pt continues demonstrating limited insight into his sxs and needs of treatment, pt reports that medication makes him feel calmer. Pt also reports that he accepts the reality that he has to be hospitalized. Writer encouraged the pt to attend groups for skill development and socialization. Pt was receptive to it. Pt has made some progress into his psych rehab goal as pt is able to identify attending groups, watching TV and taking a nap his coping strategies. Over the past week, pt has demonstrated medication compliance. Pt denies SI/HI/AH/VH. However, pt is observed internally preoccupied at times. Pt has been in fair behavioral control. Pt is visible but isolative. Pt spends most of time watching TV in dayroom. Pt is somewhat receptive to skill development. Pt attended 20% of psych rehab groups. Pt is able to appropriately participate in group discussions and activities. Writer will continue to encourage pt to engage in treatment in order to better develop coping skills and for continued support.

## 2023-02-10 NOTE — BH PSYCHOLOGY - GROUP THERAPY NOTE - NSBHPSYCHOLRESPONSE_PSY_A_CORE
Accepted support
Symptoms reduced/Coping skills acquired/Insight displayed/Accepted support
Accepted support
Coping skills acquired/Accepted support
Accepted support

## 2023-02-10 NOTE — BH SCALES AND SCREENS - NSBPRSGRANDIOS_PSY_ALL_CORE
4 = Moderate – e.g., inflated self-esteem clearly out of proportion to the circumstance, or suspected grandiose delusion(s)
2 = Very Mild – e.g., is more confident than most people, but of only possible clinical significance

## 2023-02-10 NOTE — BH TREATMENT PLAN - NSTXPSYCHOGOAL_PSY_ALL_CORE
Will identify 2 coping skills that assist with focus on reality
Other...
Will identify 2 coping skills that assist with focus on reality

## 2023-02-10 NOTE — BH TREATMENT PLAN - NSTXPSYCHOINTERPR_PSY_ALL_CORE
Patient has not demonstrated progress towards this psychiatric rehabilitation goal. Psychiatric Rehabilitation staff will continue to engage with patient in order to build therapeutic rapport and to assist patient in achieving psychiatric rehabilitation goal.
Pt has demonstrated some progress towards psychiatric rehabilitation goals over the past week. Psychiatric Rehabilitation staff will continue to meet with pt individually to provide support, encouragement, and counseling so that they will continue identifying and utilizing effective coping skills for better symptom management.

## 2023-02-10 NOTE — BH TREATMENT PLAN - NSTXDCOPNOINTERSW_PSY_ALL_CORE
SW will provide psychoeducation, support, encourage tx. and med compliance and coordinate discharge plans. Collateral/family supports to be contacted accordingly.
SW provides support, psychoed, discharge planning, collaboration with treatment team. Pt is inconsistently taking medications. Going for MOO.
SW provides support, psychoed, contact with collaterals when permitted, discharge planning and collaboration with treatment team.  Pt refused to sign HIPAA consents.

## 2023-02-11 RX ADMIN — ARIPIPRAZOLE 15 MILLIGRAM(S): 15 TABLET ORAL at 08:40

## 2023-02-12 RX ADMIN — ARIPIPRAZOLE 15 MILLIGRAM(S): 15 TABLET ORAL at 08:44

## 2023-02-13 PROCEDURE — 99231 SBSQ HOSP IP/OBS SF/LOW 25: CPT

## 2023-02-13 PROCEDURE — 90834 PSYTX W PT 45 MINUTES: CPT

## 2023-02-13 RX ORDER — ARIPIPRAZOLE 15 MG/1
20 TABLET ORAL DAILY
Refills: 0 | Status: DISCONTINUED | OUTPATIENT
Start: 2023-02-13 | End: 2023-02-15

## 2023-02-13 RX ADMIN — ARIPIPRAZOLE 15 MILLIGRAM(S): 15 TABLET ORAL at 08:33

## 2023-02-13 RX ADMIN — Medication 4 MILLIGRAM(S): at 08:33

## 2023-02-13 NOTE — BH PSYCHOLOGY - CLINICIAN PSYCHOTHERAPY NOTE - NSBHPSYCHOLPROBS_PSY_ALL_CORE
Depression
Depression/Psychosis
Depression

## 2023-02-13 NOTE — BH PSYCHOLOGY - CLINICIAN PSYCHOTHERAPY NOTE - NSBHPSYCHOLGOALS_PSY_A_CORE
Decrease symptoms/Improve social/vocational/coping skills/Prevent relapse/Psychoeducation/Treatment compliance
Decrease symptoms/Improve level of independent functioning/Improve social/vocational/coping skills/Prevent relapse/Psychoeducation/Treatment compliance
Decrease symptoms/Improve level of independent functioning/Prevent relapse/Psychoeducation/Treatment compliance
Decrease symptoms/Improve social/vocational/coping skills/Prevent relapse/Psychoeducation

## 2023-02-13 NOTE — BH PSYCHOLOGY - CLINICIAN PSYCHOTHERAPY NOTE - NSTXPSYCHOPROGRES_PSY_ALL_CORE
Patient: Cristela Peralta    Procedure Summary     Date:  01/02/20 Room / Location:   [facility]    Anesthesia Start:  0949 Anesthesia Stop:  1006    Procedure:  COLONOSCOPY (N/A ) Diagnosis:       Lower GI bleed      Gastrointestinal hemorrhage with melena      (Lower GI bleed [K92.2])      (Gastrointestinal hemorrhage with melena [K92.1])    Surgeon:  Tung Muir MD Provider:  Debroah Cline MD    Anesthesia Type:  MAC ASA Status:  3          Anesthesia Type: MAC    Vitals  Vitals Value Taken Time   /66 1/2/2020 10:15 AM   Temp     Pulse 80 1/2/2020 10:15 AM   Resp 14 1/2/2020 10:15 AM   SpO2 98 % 1/2/2020 10:15 AM           Post Anesthesia Care and Evaluation    Patient location during evaluation: PACU  Patient participation: complete - patient participated  Level of consciousness: awake and alert  Pain management: adequate  Airway patency: patent  Anesthetic complications: No anesthetic complications  PONV Status: none  Cardiovascular status: acceptable  Respiratory status: acceptable  Hydration status: acceptable      
No Change
Improving
No Change

## 2023-02-13 NOTE — BH INPATIENT PSYCHIATRY PROGRESS NOTE - NSBHASSESSSUMMFT_PSY_ALL_CORE
Patient is a 21 yo single  male domiciled with mother in [place], employed with [employer], with documented history of mental illness (schizoaffective disorder, bipolar type, cannabis and alcohol use disorder) and unclear PMH who was bib ems activated by mother on account of worsening verbal aggression and auditory hallucinations in context of treatment noncompliance.    On initial assessment, patient presents w/ symptoms of psychosis, including prominent and pervasive paranoid/persecutory delusions related to his neighbors, co-workers, and mother in the setting of treatment non-adherence. Paranoia accompanied by agitation, aggression (per collateral provider), violent ideation, and A/H. Patient denies S/I and H/I. Patient's severe paranoia, limited insight, and poor judgment render him an acute risk to himself and others. He would benefit from inpatient hospitalization for safety, stabilization, and medication optimization. Routine observation appropriate. Will start perphenazine 4mg qhs -- a more weight-neutral agent -- due to obese BMI. Of note, patient appears to have gynecomastia (w/ documented history of successful risperidone trial; despite success, would not initiate this agent at this time as patient reports feeling "feminized"). Will add Klonopin 0.5mg BID for psychotic anxiety. Due to patient's stated disinterest in psychotropic medications will hold off on re-starting Depakote, which, per chart, patient had been successful on during previous admission.     Improvements in psychosis, mood, and sleep. Accepted aripiprazole DELONG. Importance of medication adherence emphasized. In good behavioral control. Will increase aripiprazole to 20mg and c/w oral overlap.    PLAN  - Admit on 9.27  - Routine observation appropriate  - increase aripiprazole to 20mg daily  - Aristada 882mg given 2/10  - change clonazepam 1mg qhs to PRN reduce polypharmacy  - TOO granted 2/7  - No acute medical concerns  - Individual, group, milieu therapy as appropriate  - Obtain collateral information   - Dispo: when stable

## 2023-02-13 NOTE — BH INPATIENT PSYCHIATRY PROGRESS NOTE - CURRENT MEDICATION
MEDICATIONS  (STANDING):  ARIPiprazole 20 milliGRAM(s) Oral daily    MEDICATIONS  (PRN):  clonazePAM  Tablet 1 milliGRAM(s) Oral at bedtime PRN psychotic anxiety  diphenhydrAMINE 50 milliGRAM(s) Oral every 6 hours PRN EPS  diphenhydrAMINE Injectable 50 milliGRAM(s) IntraMuscular once PRN EPS  haloperidol     Tablet 5 milliGRAM(s) Oral every 6 hours PRN agitation  haloperidol    Injectable 5 milliGRAM(s) IntraMuscular once PRN severe agitation secondary to psychosis  LORazepam     Tablet 2 milliGRAM(s) Oral every 6 hours PRN anxiety  LORazepam   Injectable 2 milliGRAM(s) IntraMuscular once PRN severe agitation  nicotine  Polacrilex Gum 4 milliGRAM(s) Oral five times a day PRN NRT  OLANZapine Injectable 10 milliGRAM(s) IntraMuscular once PRN severe agitation  OLANZapine Injectable 10 milliGRAM(s) IntraMuscular once PRN severe agitation

## 2023-02-13 NOTE — BH PSYCHOLOGY - CLINICIAN PSYCHOTHERAPY NOTE - NSBHPTASSESSDT_PSY_A_CORE
07-Feb-2023 09:00
26-Jan-2023 13:08
08-Feb-2023 11:30
09-Feb-2023 01:00
01-Feb-2023 11:00
13-Feb-2023 09:00

## 2023-02-13 NOTE — BH INPATIENT PSYCHIATRY PROGRESS NOTE - MSE UNSTRUCTURED FT
On exam today the patient was again superficially cooperative  Speech loud and pressured  Thought process: overall linear  Thought content: no overt delusions elicited    Perception: Denies hearing voices or other perceptual disturbances   Mood: Describes as "good".   Affect: neutral, constricted    Patient denies active suicidal ideation, intention and plan.    Patient denies active aggressive/homicidal ideation, intent or plan.   Patient is Alert and oriented .   Fund of knowledge is fair. Memory is intact  Insight and judgment are fair  Impulse control is improved at this time.

## 2023-02-13 NOTE — BH INPATIENT PSYCHIATRY PROGRESS NOTE - NSBHMETABOLIC_PSY_ALL_CORE_FT
BMI: BMI (kg/m2): 31.8 (01-23-23 @ 15:12)  HbA1c: A1C with Estimated Average Glucose Result: 5.8 % (01-24-23 @ 08:00)    Glucose:   BP: 142/87 (02-13-23 @ 07:53) (135/65 - 142/87)  Lipid Panel: Date/Time: 01-24-23 @ 08:00  Cholesterol, Serum: 224  Direct LDL: --  HDL Cholesterol, Serum: 38  Total Cholesterol/HDL Ration Measurement: --  Triglycerides, Serum: 109

## 2023-02-13 NOTE — BH PSYCHOLOGY - CLINICIAN PSYCHOTHERAPY NOTE - NSBHPSYCHOLBILLFAM_PSY_A_CORE
28698 - 53 minutes and above
[id number] - 38 to 52 minutes
[id number] - 38 to 52 minutes
[id number] - 38 to 52 minutes
[id number] - 38 to 52 minutes
[id number] - 38 to 52 minutes

## 2023-02-13 NOTE — BH PSYCHOLOGY - CLINICIAN PSYCHOTHERAPY NOTE - NSBHPSYCHOLINT_PSY_A_CORE
Cognitive/behavioral therapy/Dialectical  Behavioral Therapy (DBT)
Monitor abstinence
Cognitive/behavioral therapy
Cognitive/behavioral therapy/Dialectical  Behavioral Therapy (DBT)

## 2023-02-13 NOTE — BH PSYCHOLOGY - CLINICIAN PSYCHOTHERAPY NOTE - NSBHPSYCHOLNARRATIVE_PSY_A_CORE FT
Pt provided consent to meet with writer for individual psychotherapy session. Session took place after pt's court hearing, whereas pt petitioned to be discharged from his current psychiatric hospitalization against his psychiatric team's recommendations. Pt notified writer that he "lost" the case upon the start of their session. He appeared to be disheveled and unkempt, presenting differently than he did prior to his experience at court. Pt was observed by his treatment team to be experiencing auditory hallucinations, as evidenced by him having a conversation on his own aloud. Pt previously had elzbieta in his hair, and writer observed that pt undid his elzbieta and his shirt appeared to be stained. Pt discussed his feelings about the court decision and shared that he feels depressed and has "given up." Pt and writer discussed pt's current grieving process and his loss of autonomy. Pt and writer also discussed behavioral observations related to his current clinical presentation. At the end of session, writer introduced the DBT concept of 'radical acceptance' and discussed ways in which pt can radically accept his current situation, without agreeing with it, and ways in which he can still find meaning and value in his life. Pt was able to discuss short-term goals, however demonstrated paranoid delusional thinking and was self-referential in his speech. Pt denied having thoughts to harm himself or others. 
Pt provided consent to meet with writer for individual psychotherapy. Pt appeared to be emotionally dysregulated and was preoccupied with getting discharged from his current psychiatric hospitalization. Pt shared that he believes his prolonged hospitalization has resulted in him feeling depressed and has exacerbated his symptoms. Pt denied suicidal/homicidal ideation, plan, or intent. Pt expressed his anger related to being unable to be discharged from the hospital. He shared that he is interested in going back to work and following through with his medication management and psychotherapy in a 'non-confined' environment. Pt was observed to be more active on the unit as compared to his presentation the day prior on 2/9/23. Writer worked on maintaining an alliance and a rapport with pt, who expressed that he does not believe the treatment team 'hears him' or 'understands his perspective.' Pt also shared that he was disturbed by other psychotic pts' behaviors towards him. Writer provided pt with validation and support, as well as highlighted the importance of engaging in groups and activities on the unit in an effort to improve his mental health and meet his current goal of discharge. 
Pt provided consent to meet with writer for individual psychotherapy session. Pt was alert and fully engaged. He shared his feelings about his upcoming court hearing and discussed his frustration about his current treatment team enforcing a higher dose of medication than he would like. Pt shared that he is interested in continuing medication management and psychotherapy on an outpatient basis, however felt depressed receiving treatment on an inpatient unit. Pt and writer discussed the importance of maintaining his treatment regimen and increasing his adherence to treatment. Pt discussed his experiences as a Black male living in [place] versus his experience living in [place], where he faced less racial discrimination and biases. Pt reported that he is constantly in fight or flight in his community, where he resides with his mother. Writer worked with pt to further explore his experience as a Black male within a mental health court system and further provided pt with the space to discuss his experience of a loss of autonomy and control on his inpatient unit. Writer also relayed the treatment team's well-intent to provide pt with the most effective evidence-based treatment and care. 
Pt provided consent to meet with  for an individual therapy session. Writer discussed pt's recent behavioral outburst on the unit and provided pt with the opportunity to discuss his feelings. Writer validated pt's concerns and discussed the importance of sharing his feelings with staff in an adaptive manner in order to get his needs met. Narayanr provided pt with adaptive coping skills to regulate his emotions, including the STOP skill, TIPP, and assertive communication. Pt was cooperative and motivated during session with . Pt discussed his history with  and demonstrated good insight into his past behaviors, including substance use, and mental health symptoms impeding his ability to think rationally.
Pt consented to meet with rojelior for an individual psychotherapy session. Pt reported he was feeling frustrated with the med management team with regard to medication he was prescribed. Writer validated pts concerns and provided him with psychoeducation on the importance of adhering to his treatment regimen. Pt does not believe he has a mental illness and believes his paranoid symptoms are reality. Writer provided psychoeducation on reality testing, while also validating pt's past experiences with his neighbors. 
Pt consented an individual psychotherapy session with writer. Pt was alert and fully engaged. He appeared to be more energetic and less withdrawn as compared to previous sessions after his court hearing. Pt demonstrated an understanding and utilization of DBT skills writer introduced to him in previous sessions, specifically radical acceptance and YONAS skills. Pt and writer discussed pt's weekend, pt shared his experience watching the Superbowl at the hospital and appeared content while discussing his interests with writer. Pt demonstrated an interest in understanding whether he would be discharged this week, as he stated he has "arrangements to take care of." When asked to provide detail on the arrangements, pt reported that he may need to freeze his credit and speak to his phone carrier in an effort to prevent his credit score from depreciating. Pt's thought process was linear and coherent. Pt demonstrated an interest in continuing treatment upon his discharge from his current hospitalization and stated he may seek treatment at [facility], as he had a pleasant psychiatric experience with them in the past in which he had the ability to be more autonomous. Pt and writer discussed his understanding of his psychiatric diagnosis and discussed his experience of his mother's disorder. Pt was able to differentiate between his mother's experiences living with a mental illness and his own experience.

## 2023-02-13 NOTE — BH INPATIENT PSYCHIATRY PROGRESS NOTE - NSBHFUPINTERVALHXFT_PSY_A_CORE
Patient seen for follow up of psychosis. Chart reviewed and case discussed with treatment team. Adherent w/ medications, good sleep, in good behavioral control overnight. Reports fair mood; says mood feels more stable. Enjoyed the weekend. Denies paranoia, including ongoing issues w/ neighbors, mother, and coworkers. Accepted Abilify DELONG. Psychoeducation provided on importance of medication adherence. No medication side effects. No physical complaints.

## 2023-02-13 NOTE — BH PSYCHOLOGY - CLINICIAN PSYCHOTHERAPY NOTE - NSBHPSYCHOLASSESSPROV_PSY_A_CORE
Licensed Psychologist and Psychology Trainee

## 2023-02-14 ENCOUNTER — OUTPATIENT (OUTPATIENT)
Dept: OUTPATIENT SERVICES | Facility: HOSPITAL | Age: 23
LOS: 1 days | Discharge: HOME | End: 2023-02-14
Payer: COMMERCIAL

## 2023-02-14 PROCEDURE — 99231 SBSQ HOSP IP/OBS SF/LOW 25: CPT | Mod: GC

## 2023-02-14 RX ORDER — ARIPIPRAZOLE 15 MG/1
1 TABLET ORAL
Qty: 14 | Refills: 0
Start: 2023-02-14 | End: 2023-02-27

## 2023-02-14 RX ORDER — NICOTINE POLACRILEX 2 MG
1 GUM BUCCAL
Qty: 0 | Refills: 0 | DISCHARGE
Start: 2023-02-14

## 2023-02-14 RX ORDER — CLONAZEPAM 1 MG
1 TABLET ORAL AT BEDTIME
Refills: 0 | Status: DISCONTINUED | OUTPATIENT
Start: 2023-02-14 | End: 2023-02-15

## 2023-02-14 RX ORDER — ARIPIPRAZOLE 15 MG/1
882 TABLET ORAL ONCE
Refills: 0 | Status: DISCONTINUED | OUTPATIENT
Start: 2023-03-10 | End: 2023-02-15

## 2023-02-14 RX ADMIN — ARIPIPRAZOLE 20 MILLIGRAM(S): 15 TABLET ORAL at 08:31

## 2023-02-14 NOTE — BH INPATIENT PSYCHIATRY PROGRESS NOTE - NSBHMETABOLIC_PSY_ALL_CORE_FT
BMI: BMI (kg/m2): 31.8 (01-23-23 @ 15:12)  HbA1c: A1C with Estimated Average Glucose Result: 5.8 % (01-24-23 @ 08:00)    Glucose:   BP: 142/87 (02-13-23 @ 07:53) (142/87 - 142/87)  Lipid Panel: Date/Time: 01-24-23 @ 08:00  Cholesterol, Serum: 224  Direct LDL: --  HDL Cholesterol, Serum: 38  Total Cholesterol/HDL Ration Measurement: --  Triglycerides, Serum: 109

## 2023-02-14 NOTE — BH INPATIENT PSYCHIATRY PROGRESS NOTE - MSE UNSTRUCTURED FT
On exam today the patient was again superficially cooperative  Speech loud and pressured  Thought process: overall linear  Thought content: no overt delusions elicited    Perception: Denies hearing voices or other perceptual disturbances   Mood: Describes as "i'm using my inside voice more".   Affect: neutral, constricted    Patient denies active suicidal ideation, intention and plan.    Patient denies active aggressive/homicidal ideation, intent or plan.   Patient is Alert and oriented .   Fund of knowledge is fair. Memory is intact  Insight and judgment are fair  Impulse control is improved at this time.

## 2023-02-14 NOTE — BH INPATIENT PSYCHIATRY PROGRESS NOTE - NSBHASSESSSUMMFT_PSY_ALL_CORE
Patient is a 21 yo single  male domiciled with mother in [place], employed with [employer], with documented history of mental illness (schizoaffective disorder, bipolar type, cannabis and alcohol use disorder) and unclear PMH who was bib ems activated by mother on account of worsening verbal aggression and auditory hallucinations in context of treatment noncompliance.    On initial assessment, patient presents w/ symptoms of psychosis, including prominent and pervasive paranoid/persecutory delusions related to his neighbors, co-workers, and mother in the setting of treatment non-adherence. Paranoia accompanied by agitation, aggression (per collateral provider), violent ideation, and A/H. Patient denies S/I and H/I. Patient's severe paranoia, limited insight, and poor judgment render him an acute risk to himself and others. He would benefit from inpatient hospitalization for safety, stabilization, and medication optimization. Routine observation appropriate. Will start perphenazine 4mg qhs -- a more weight-neutral agent -- due to obese BMI. Of note, patient appears to have gynecomastia (w/ documented history of successful risperidone trial; despite success, would not initiate this agent at this time as patient reports feeling "feminized"). Will add Klonopin 0.5mg BID for psychotic anxiety. Due to patient's stated disinterest in psychotropic medications will hold off on re-starting Depakote, which, per chart, patient had been successful on during previous admission.     Continued improvements in psychosis, mood, and sleep. Attributes improvements to medication; insight improved.  In good behavioral control. Adherent w/ medications. Will c/w aripiprazole 20mg.     PLAN  - Admit on 9.27  - Routine observation appropriate  - c/w aripiprazole 20mg daily  - Aristada 882mg given 2/10  - change clonazepam 1mg qhs to PRN reduce polypharmacy  - TOO granted 2/7  - No acute medical concerns  - Individual, group, milieu therapy as appropriate  - Obtain collateral information   - Dispo: when stable

## 2023-02-14 NOTE — BH INPATIENT PSYCHIATRY PROGRESS NOTE - NSBHFUPINTERVALHXFT_PSY_A_CORE
Patient seen for follow up of psychosis. Chart reviewed and case discussed with treatment team. Adherent w/ medications, good sleep, in good behavioral control overnight. Reports mood continues to improve. Attributes this to medication. Feels calm; less easily angered. Reports he is using his "inside voice" more. Denies paranoia, including ongoing issues w/ neighbors, mother, and coworkers. Importance of medication adherence emphasized. No medication side effects. No physical complaints.

## 2023-02-15 VITALS — TEMPERATURE: 98 F | DIASTOLIC BLOOD PRESSURE: 76 MMHG | SYSTOLIC BLOOD PRESSURE: 133 MMHG | HEART RATE: 82 BPM

## 2023-02-15 PROCEDURE — 99232 SBSQ HOSP IP/OBS MODERATE 35: CPT | Mod: GC

## 2023-02-15 RX ORDER — ARIPIPRAZOLE 15 MG/1
1 TABLET ORAL
Qty: 0 | Refills: 0 | DISCHARGE

## 2023-02-15 RX ADMIN — ARIPIPRAZOLE 20 MILLIGRAM(S): 15 TABLET ORAL at 09:06

## 2023-02-15 NOTE — BH INPATIENT PSYCHIATRY PROGRESS NOTE - NSDCCRITERIA_PSY_ALL_CORE
improvement in psychotic symptoms and stable for discharge with a CGI Improvement score =2

## 2023-02-15 NOTE — BH INPATIENT PSYCHIATRY DISCHARGE NOTE - HPI (INCLUDE ILLNESS QUALITY, SEVERITY, DURATION, TIMING, CONTEXT, MODIFYING FACTORS, ASSOCIATED SIGNS AND SYMPTOMS)
Per ED  Assessment Note:   "Patient is a 23 yo single  male domiciled with mother in [place], employed with [employer], with documented history of mental illness (schizoaffective disorder, bipolar type, cannabis and alcohol use disorder) and unclear PMH who was bibems activated by mother on account of worsening verbal aggression and auditory hallucinations in context of treatment noncompliance.    Patient was given IM medication upon arrival due to agitation and would not cooperate. On evaluation, patient states his neighbors downstairs have been invading his privacy, making noise to get under his skin and have been using an eve to access his phone screen. He also reports belief that his mother and her "everyone" hate him as a group. He says last week at work, "the entire facility was trying to alienate me." He states he has left everyone alone, his family and friends, "trying to make new connections." Patient presenting with persecutory and paranoid delusions although he denies auditory, visual and other forms of hallucinations. He says his mood has been "wonderful," although denies increased energy, racing thoughts, reduced need for sleep and other history suggestive of cristine at this time. He states he has an upcoming test to enlist in the [employer] and he denies suicidal and homicidal ideations, intent, or plan.     Per collateral obtained from mother: patient has internally preoccupied, hearing voices, has not been sleeping, increasingly paranoid to the point of buying knives, machete and making threats. He has not been in psychiatric treatment since his last psychiatric admission. Mother is concerned patient is "going to hurt someone.""    On initial assessment, patient presents as cooperative, although somewhat agitated and very paranoid. Patient's associations loosen at times and he can be difficult to follow. Patient reports he activated EMS himself due to concern that he might hurt people that he believes are targeting him. Patient says downstairs neighbors are conspiring against him, although he struggles to provide details as to how or why. Believes they are frequently talking about him, hacking his phone (or using applications to track his activity), and that they might hurt him in the future. He says he can frequently hear these neighbors talking about him or saying his ex-girlfriend's name out loud. Patient believes his mother may be in on some sort of plan against him. However, he also believes mother may be a target of some sort of larger conspiracy. He does not know who might be targeting mother. Does not know why he or his mother may be targets. Patient describes recent paranoia at job at [employer]. He reports walking into work and everyone telling him he was a . Of note, he refers to various neighborhoods by their precinct numbers. He denies S/I/I/P. Although he is concerned he might hurt neighbors, or others, if they "poke the bear," he denies homicidal intent/plan. He say he is "not that gruesome." Denies recent substance use. Not currently in psychiatric treatment. Reports 4 past hospitalizations, including 2 at [facility] (most recently [facility] in 2022) and 2 and [facility]. Not interested in psychotropic medications at this time; says they made him feel "girly" and made him want to cry.

## 2023-02-15 NOTE — BH INPATIENT PSYCHIATRY PROGRESS NOTE - NSTXDCOPNODATEEST_PSY_ALL_CORE
24-Jan-2023
24-Jan-2023
23-Jan-2023
24-Jan-2023
23-Jan-2023
23-Jan-2023
24-Jan-2023
23-Jan-2023
24-Jan-2023
23-Jan-2023
24-Jan-2023

## 2023-02-15 NOTE — BH DISCHARGE NOTE NURSING/SOCIAL WORK/PSYCH REHAB - DISCHARGE INSTRUCTIONS AFTERCARE APPOINTMENTS
In order to check the location, date, or time of your aftercare appointment, please refer to your Discharge Instructions Document given to you upon leaving the hospital.  If you have lost the instructions please call [phone]

## 2023-02-15 NOTE — BH INPATIENT PSYCHIATRY PROGRESS NOTE - NSCGIIMPROVESX_PSY_ALL_CORE
4 = No change - symptoms remain essentially unchanged

## 2023-02-15 NOTE — BH INPATIENT PSYCHIATRY PROGRESS NOTE - NSTXPSYCHOPROGRES_PSY_ALL_CORE
Improving
No Change
Improving
Improving
No Change
Improving
No Change
No Change
Met - goal discontinued
Improving
No Change
Improving
No Change
Improving
No Change

## 2023-02-15 NOTE — BH INPATIENT PSYCHIATRY PROGRESS NOTE - NSBHATTESTBILLING_PSY_A_CORE
27944-Rwtruqgdaq OBS or IP - low complexity OR 25-34 mins
[id number]-Tdedvlliny OBS or IP - low complexity OR 25-34 mins
68500-Cddvchxttn OBS or IP - moderate complexity OR 35-49 mins
04974-Yecnhncnuk OBS or IP - moderate complexity OR 35-49 mins
44487-Qghhxwefkk OBS or IP - low complexity OR 25-34 mins
[id number]-Uxpqiecuae OBS or IP - low complexity OR 25-34 mins
[id number]-Aoakwhpsfc OBS or IP - low complexity OR 25-34 mins
[id number]-Zcilipuxpx OBS or IP - low complexity OR 25-34 mins
58647-Njvitahinc OBS or IP - low complexity OR 25-34 mins
78715-Ltucnpbrei OBS or IP - low complexity OR 25-34 mins
23887-Iddjrdohpx OBS or IP - moderate complexity OR 35-49 mins
47329-Qgrolamygi OBS or IP - low complexity OR 25-34 mins
12635-Ugjvmrtgeo OBS or IP - low complexity OR 25-34 mins
37962-Crnsrtxgoe OBS or IP - low complexity OR 25-34 mins
68426-Ajygsmktlg OBS or IP - low complexity OR 25-34 mins
57635-Lobsnxmprj OBS or IP - moderate complexity OR 35-49 mins
94551-Qlbncslqub OBS or IP - moderate complexity OR 35-49 mins
29779-Yytribklej OBS or IP - moderate complexity OR 35-49 mins

## 2023-02-15 NOTE — BH INPATIENT PSYCHIATRY PROGRESS NOTE - MSE UNSTRUCTURED FT
On exam today the patient was again superficially cooperative  Speech loud and pressured  Thought process: overall linear  Thought content: no overt delusions elicited    Perception: Denies hearing voices or other perceptual disturbances   Mood: Describes as "good".   Affect: neutral to euthymic, stable    Patient denies active suicidal ideation, intention and plan.    Patient denies active aggressive/homicidal ideation, intent or plan.   Patient is Alert and oriented .   Fund of knowledge is fair. Memory is intact  Insight and judgment are fair  Impulse control is improved at this time.

## 2023-02-15 NOTE — BH INPATIENT PSYCHIATRY DISCHARGE NOTE - HOSPITAL COURSE
Patient initially presented w/ symptoms of psychosis, including prominent and pervasive paranoid/persecutory delusions related to his neighbors, co-workers, and   mother -- culminating in patient purchasing a machete to protect himself from said neighbors -- in the setting of treatment non-adherence. Per collateral provider, paranoia accompanied by auditory hallucinations (appearance of being responsive to internal stimuli) agitation, aggression, and violent ideation.     Patient was initially started on perphenazine 4mg qhs, as patient has an obese BMI and this agent is more weight-neutral than other options. Of note, patient described feeling "feminized" on risperidone in the past, however, refused to elaborate, but patient w/ notable gynecomastia on exam. Perphenazine was titrated to 12mg qhs, however, patient sporadically refused medications. Patient was then cross-titrated to aripiprazole due to its DELONG option. Patient continued to inconsistently take medication. He submitted an application for court-ordered discharge. Treatment team submitted an application for treatment over objection. Treatment over objection was granted on 02/07. He was started on 5mg qhs, which was eventually switched to qam due to sleep disturbance, and was titrated to 20mg w/ good efficacy and tolerability. He was given an injection of Aristada 882mg on 02/10.     Over course of hospitalization, psychosis improved significantly. Patient's paranoia, including persecutory thoughts related to neighbors, mother, and co-worker, diminished. He was increasingly able to distract himself from paranoid thoughts. Initially, before patient began taking medication consistently, he was frequently paranoid, loud, and agitated w/ treatment team, however, over time, he became increasingly calm and cooperative. He never appeared to be responding to internal stimuli. Although he initially had episodes of agitation, he was never verbally or physically threatening w/ treatment team. He never expressed any sort of violent or homicidal ideation while on the unit. His thoughts became increasingly future-oriented w/ plans to return to home (w/o thoughts of distress or concern about neighbors or mother) and return to work.     Initially, patient was in tenuous behavioral control. He was frequently agitated and loud w/ treatment team. However, as written above, he was never verbally or physically threatening. He never required IM medications for agitation. Behavioral control improved over course of hospital stay. He was increasingly calm, cooperative, and pleasant w/ treatment team. He attended every group and was interactive in the unit milieu as well as social with peers. He tended to ADLs independently and appropriately. He consistently denied S/I/I/P, V/I/I/P, H/I/I/P. He exhibited good appetite and energy. Patient's mother was involved with treatment and very supportive.    Pt remains a high chronic risk of harm to self due to primary psychosis, hx of prior hospitalizations, hx of chronic non-adherence to meds and outpt treatment, limited social supports. Acute risk factors include psychosis in the setting of non-adherence to meds, now treated with inpatient hospitalization. Other protective factors that mitigate acute risks include pt recently consistently denied S/I/I/P (as well as H/I/I/P), he is hopeful and future-oriented (with plans to return to work), he does not have access to a gun, he is now motivated to further engage in outpt pt treatment and has been compliant with meds, he has received an DELONG, and he is able to engage in safety planning. Pt (and family) are agreeable to contact outpt providers, call 911, or go to the nearest ED with any imminent safety concerns for self or others. Given the above, patient does not appear to constitute imminent threat to self or others and is appropriate for discharge.    Though pt remains with some psychotic symptoms, pt appears to be at his baseline and no longer presents as an acute risk of harm to self or others (and is therefore appropriate for discharge with continued outpatient follow-up).    Pt was discharged with 2-week supply of aripiprazole 20mg as overlap w/ DELONG Aristada. Patient received Aristada 882mg on 2/10; next due 3/10.

## 2023-02-15 NOTE — BH INPATIENT PSYCHIATRY PROGRESS NOTE - NSBHMETABOLIC_PSY_ALL_CORE_FT
BMI: BMI (kg/m2): 31.8 (01-23-23 @ 15:12)  HbA1c: A1C with Estimated Average Glucose Result: 5.8 % (01-24-23 @ 08:00)    Glucose:   BP: 133/76 (02-15-23 @ 08:23) (133/76 - 142/87)  Lipid Panel: Date/Time: 01-24-23 @ 08:00  Cholesterol, Serum: 224  Direct LDL: --  HDL Cholesterol, Serum: 38  Total Cholesterol/HDL Ration Measurement: --  Triglycerides, Serum: 109

## 2023-02-15 NOTE — BH INPATIENT PSYCHIATRY PROGRESS NOTE - NS ED [facility] MED ROS PSYCHIATRIC
See HPI
Daily Assessment

## 2023-02-15 NOTE — BH INPATIENT PSYCHIATRY PROGRESS NOTE - NSBHATTESTCOMMENTATTENDFT_PSY_A_CORE
21 yo single  male domiciled with mother in [place], employed with [employer], with documented history of mental illness (schizoaffective disorder, bipolar type, cannabis and alcohol use disorder) and unclear PMH who was bib ems activated by mother on account of worsening verbal aggression and auditory hallucinations in context of treatment noncompliance.    1/24 Update  Patient took perphenazine despite stating that he wouldn't  With persistent paranoia but less threatening  Tolerated first dose of perphenazine 4 mg and w2ill titrate to 8 mg HS  Clonazepam 0.5 mg BID  
21 yo single  male domiciled with mother in [place], employed with [employer], with documented history of mental illness (schizoaffective disorder, bipolar type, cannabis and alcohol use disorder) and unclear PMH who was bib ems activated by mother on account of worsening verbal aggression and auditory hallucinations in context of treatment noncompliance.    1/30 Update  Patient did not take 10 mg of aripiprazole yesterday but only 5 mg which was explained as subtherapeutic  Patient escalated verbally and became severely paranoid and verbally abusive  Demanded discharge and called MD a "Bigot and a Racist" for not discharging him today with only therapy  Patient has contacted [facility] who have submitted [id number] for his discharge  Patient remains psychotic with paranoid delusions about his neighbors and his mother. He is too ill at this time to be discharged to any lower level of care  He has impaired insight into having symptoms and illness and lacks the capacity to make a reasoned decision about his treatment  Will begin application process to court for treatment over objection  
23 yo single  male domiciled with mother in [place], employed with [employer], with documented history of mental illness (schizoaffective disorder, bipolar type, cannabis and alcohol use disorder) and unclear PMH who was bib ems activated by mother on account of worsening verbal aggression and auditory hallucinations in context of treatment noncompliance.    1/31 Update  Patient initially cooperative and took 10 mg of aripiprazole but later was verbally threatening to RN  Patient again escalated verbally and became severely paranoid and verbally abusive  Demanded discharge and called MD a "Bigot and a Racist" for not discharging him today with only therapy  Patient has contacted [facility] who have submitted a 931 for his discharge Court 2/7  Patient remains psychotic with paranoid delusions about his neighbors and his mother and staff.   He is too ill at this time to be discharged to any lower level of care  He has impaired insight into having symptoms and illness and lacks the capacity to make a reasoned decision about his treatment  Will begin application process to court for treatment over objection for administrative meeting 2/1/23  
21 yo single  male domiciled with mother in [place], employed with [employer], with documented history of mental illness (schizoaffective disorder, bipolar type, cannabis and alcohol use disorder) and unclear PMH who was bib ems activated by mother on account of worsening verbal aggression and auditory hallucinations in context of treatment noncompliance.    1/27 Update  Patient did not tolerate aripiprazole at night and it disturbed his sleep and will change to the morning   With persistent paranoia but less threatening  change to aripiprazole for possibility of transition to DELONG    Clonazepam 1 mg HS PRN  
21 yo single  male domiciled with mother in [place], employed with [employer], with documented history of mental illness (schizoaffective disorder, bipolar type, cannabis and alcohol use disorder) and unclear PMH who was bib ems activated by mother on account of worsening verbal aggression and auditory hallucinations in context of treatment noncompliance.    2/10 Update  Patient upset over 's decision but was in behavioral control  More depressed and withdrawn today  Has been adherent as per court order with aripiprazole 15 and agreed to DELONG    
23 yo single  male domiciled with mother in [place], employed with [employer], with documented history of mental illness (schizoaffective disorder, bipolar type, cannabis and alcohol use disorder) and unclear PMH who was bib ems activated by mother on account of worsening verbal aggression and auditory hallucinations in context of treatment noncompliance.    2/8 Update  Court on 2/7  did not meghan 931 discharge request but did meghan the hospital's TOO application  Patient upset over 's decision but was in behavioral control  Was adherent as per court order with aripiprazole 10 mg today  
21 yo single  male domiciled with mother in [place], employed with [employer], with documented history of mental illness (schizoaffective disorder, bipolar type, cannabis and alcohol use disorder) and unclear PMH who was bib ems activated by mother on account of worsening verbal aggression and auditory hallucinations in context of treatment noncompliance.    2/3 Update  Patient took 5 mg dose of aripiprazole last evening and slept poorly and this morning  Refusing to increase dose but will agree to this low dose over the weekend  Patient has contacted [facility] who have submitted a 931 for his discharge Court 2/7  Patient remains psychotic with paranoid delusions about his neighbors and his mother and staff.   He is too ill at this time to be discharged to any lower level of care  Court for treatment over objection 2/7/23  
23 yo single  male domiciled with mother in [place], employed with [employer], with documented history of mental illness (schizoaffective disorder, bipolar type, cannabis and alcohol use disorder) and unclear PMH who was bib ems activated by mother on account of worsening verbal aggression and auditory hallucinations in context of treatment noncompliance.    2/1 Update  Patient had administrative meeting with inpatient director and [facility]LS for TOO  After meeting agreed to discuss med changes and options  Patient has contacted [facility] who have submitted a 931 for his discharge Court 2/7  Patient remains psychotic with paranoid delusions about his neighbors and his mother and staff.   He is too ill at this time to be discharged to any lower level of care  Court for treatment over objection 2/7/23  
23 yo single  male domiciled with mother in [place], employed with [employer], with documented history of mental illness (schizoaffective disorder, bipolar type, cannabis and alcohol use disorder) and unclear PMH who was bib ems activated by mother on account of worsening verbal aggression and auditory hallucinations in context of treatment noncompliance.    2/13 Update  Patient more cooperative on exam  Has been doing better with staff and peers  Tolerated DELONG and increasing oral aripiprazole    
21 yo single  male domiciled with mother in [place], employed with [employer], with documented history of mental illness (schizoaffective disorder, bipolar type, cannabis and alcohol use disorder) and unclear PMH who was bib ems activated by mother on account of worsening verbal aggression and auditory hallucinations in context of treatment noncompliance.    2/7 Update  Patient taking 5 mg dose of aripiprazole and agreeing to 10 mg today    Case for his 931 for his discharge  is today  Court for treatment over objection as he is still taking subtherapeutic doses scheduled for today  
23 yo single  male domiciled with mother in [place], employed with [employer], with documented history of mental illness (schizoaffective disorder, bipolar type, cannabis and alcohol use disorder) and unclear PMH who was bib ems activated by mother on account of worsening verbal aggression and auditory hallucinations in context of treatment noncompliance.    2/14 Update  Patient with improved insight and cooperative on exam  Has been doing better with staff and peers  Tolerated DELONG and increasing oral aripiprazole    
21 yo single  male domiciled with mother in [place], employed with [employer], with documented history of mental illness (schizoaffective disorder, bipolar type, cannabis and alcohol use disorder) and unclear PMH who was bib ems activated by mother on account of worsening verbal aggression and auditory hallucinations in context of treatment noncompliance.    2/6 Update  Patient taking 5 mg dose of aripiprazole over the weekend and feels it's helpful  Still refusing to increase dose and still asking for discharge   Case for his 931 for his discharge  is tomorrow  2/7  Court for treatment over objection as he is still taking subtherapeutic doses scheduled for 2/7/23  
23 yo single  male domiciled with mother in [place], employed with [employer], with documented history of mental illness (schizoaffective disorder, bipolar type, cannabis and alcohol use disorder) and unclear PMH who was bib ems activated by mother on account of worsening verbal aggression and auditory hallucinations in context of treatment noncompliance.    1/26 Update  Patient took perphenazine again last night despite stating that he wouldn't  With persistent paranoia but less threatening  Unable to consider possibility he has symptoms and illness despite 4 hospitalizations  With verbal permission from patient spoke to mom for update and collateral   Wull change from perphenazine to aripiprazole for possibility of transition to DELONG    Clonazepam 1 mg HS  
21 yo single  male domiciled with mother in [place], employed with [employer], with documented history of mental illness (schizoaffective disorder, bipolar type, cannabis and alcohol use disorder) and unclear PMH who was bib ems activated by mother on account of worsening verbal aggression and auditory hallucinations in context of treatment noncompliance.    2/9 Update  Patient upset over 's decision but was in behavioral control  More depressed and withdrawn today  Has been adherent as per court order with aripiprazole 10 mg     
21 yo single  male domiciled with mother in [place], employed with [employer], with documented history of mental illness (schizoaffective disorder, bipolar type, cannabis and alcohol use disorder) and unclear PMH who was bib ems activated by mother on account of worsening verbal aggression and auditory hallucinations in context of treatment noncompliance.    2/15 Update  Patient with improved insight and much improved clinically and stable for discharge  Denying delusions and AH and SI  Suicide and risk assessment performed prior to discharge.   The patient has a low acute risk and low chronic risk of self-harm and aggression towards others.   Protective factors include denying SI, no SIB, denying HI, good social supports in their family, no substance abuse, no current mood symptoms, no hopelessness, future-oriented in returning to home, no access to firearms.    Risk factors include presenting illness. Immediate risk was minimized by inpatient admission to a safe environment with appropriate supervision and limited access to lethal means.   Future risk was minimized before discharge by treatment of acute episode, maximizing outpatient support, providing relevant patient education, discussing emergency procedures, and ensuring close follow-up.  The patient remains at a low risk of self-harm, and such risk cannot be further ameliorated by continued inpatient treatment and the patient is therefore appropriate for discharge.

## 2023-02-15 NOTE — BH INPATIENT PSYCHIATRY DISCHARGE NOTE - OTHER PAST PSYCHIATRIC HISTORY (INCLUDE DETAILS REGARDING ONSET, COURSE OF ILLNESS, INPATIENT/OUTPATIENT TREATMENT)
Pt. is a 23 y/o AA male domiciled with mother with history of Schizoaffective d/o, bipolar type, cannabis and alcohol use d/o with four prior psych. hospitalizations the most recent Feb. 2022.  Pt. was bib EMS due to internal preoccupation, worsening AH and verbal aggression, increasingly paranoid to the point that pt. was buying knifes, machete and making verbal threats in the context of treatment and med non-compliance.  Pt. has stated that the downstairs neighbors are conspiring against him, although pt. struggles to provide details at to how or why.  Believes his phone is being hacked and using applications to track his activity.  Upon discharge from last hospitalization, follow-up was arranged with Dr. Blanchard at [facility] although pt has not been adherent.

## 2023-02-15 NOTE — BH INPATIENT PSYCHIATRY DISCHARGE NOTE - NSDCMRMEDTOKEN_GEN_ALL_CORE_FT
ARIPiprazole 20 mg oral tablet: 1 tab(s) orally once a day  nicotine: 1 stick(s) orally 4 times a day, As Needed   ARIPiprazole 20 mg oral tablet: 1 tab(s) orally once a day  Aristada 882 mg/3.2 mL intramuscular suspension, extended release: 1 application intramuscular every 4 weeks  nicotine: 1 stick(s) orally 4 times a day, As Needed

## 2023-02-15 NOTE — BH INPATIENT PSYCHIATRY DISCHARGE NOTE - ATTENDING DISCHARGE PHYSICAL EXAMINATION:
Patient seen individually for discharge day management. I was physically present during the service to the patient and personally examined the patient and I was directly involved in the management plan and recommendations of the care provided to the patient.   I have reviewed the resident's discharge summary and agree with its contents, and I have made changes as indicated     Hospital Course of treatment as reviewed above.   Of note patient had been uncooperative and paranoid about treatment team at first refusing therapeutic doses of medication  Had submitted a 931 for discharge and team needed to submit an application for TOO.   TOO was granted on 2/7/23 and patient improved rapidly with therapeutic doses of medications    On discharge exam today the patient is cooperative and makes fair eye contact.   Speech is clear and of normal rate.    Thought process: with no disorder of thought process.   Thought content: with no evidence of delusional beliefs.   Perception: Denies hallucinations.  Mood: Describes as "improved"   Affect: flat.  Patient denies suicidal and aggressive ideation, intent and plan.   AAO X3. Cognitively grossly intact.   Insight and judgment are improved.  Impulse control is intact at this time    Suicide and risk assessment performed prior to discharge.   The patient has a low acute risk and low chronic risk of self-harm and aggression towards others.   Protective factors include denying SI, no SIB, denying HI, good social supports in their family, no substance abuse, no current mood symptoms, no hopelessness, future-oriented in returning to home, no access to firearms.    Risk factors include presenting illness. Immediate risk was minimized by inpatient admission to a safe environment with appropriate supervision and limited access to lethal means.   Future risk was minimized before discharge by treatment of acute episode, maximizing outpatient support, providing relevant patient education, discussing emergency procedures, and ensuring close follow-up.  The patient remains at a low risk of self-harm, and such risk cannot be further ameliorated by continued inpatient treatment and the patient is therefore appropriate for discharge.       There were no behavioral problems on the unit.  Patient did not become agitated and did not require emergent intramuscular medications or seclusion / restraints.  Patient did not self-harm on the unit.      Patient remained actively engaged in treatment.  Patient participated in individual, group, and milieu therapy.  Patient got along appropriately with staff and peers.     Patient did not have any medical problems during this hospitalization.  There were no medical consultations.    A full discussion of the factors that predict treatment success and relapse was held including safety planning.    A discussion of the risks and benefits of patient’s medication was held before discharge.  This included a focused discussion of the metabolic risks and risk of EPS and TD     On day of discharge, the patient no longer requires inpatient treatment and care.   Patient denies all suicidal and aggressive ideation, intention, and plan.   Patient denies anxiety symptoms and panic attacks.   Patient is clinically improved and stable for discharge with a CGI Improvement score= 2  Patient is not judged to be an acute danger to self or others at this time. Patient will be discharged to home and outpatient follow up at [facility]

## 2023-02-15 NOTE — BH INPATIENT PSYCHIATRY PROGRESS NOTE - NSTXPSYCHOGOAL_PSY_ALL_CORE
Other...
Other...
Will identify 2 coping skills that assist with focus on reality
Other...
Will identify 2 coping skills that assist with focus on reality
Other...
Other...

## 2023-02-15 NOTE — BH INPATIENT PSYCHIATRY PROGRESS NOTE - CURRENT MEDICATION
MEDICATIONS  (STANDING):  ARIPiprazole 20 milliGRAM(s) Oral daily  ARIPiprazole lauroxil Injectable, Long Acting (ARISTADA) 882 milliGRAM(s) IntraMuscular once    MEDICATIONS  (PRN):  clonazePAM  Tablet 1 milliGRAM(s) Oral at bedtime PRN psychotic anxiety  diphenhydrAMINE 50 milliGRAM(s) Oral every 6 hours PRN EPS  diphenhydrAMINE Injectable 50 milliGRAM(s) IntraMuscular once PRN EPS  haloperidol     Tablet 5 milliGRAM(s) Oral every 6 hours PRN agitation  haloperidol    Injectable 5 milliGRAM(s) IntraMuscular once PRN severe agitation secondary to psychosis  LORazepam     Tablet 2 milliGRAM(s) Oral every 6 hours PRN anxiety  LORazepam   Injectable 2 milliGRAM(s) IntraMuscular once PRN severe agitation  nicotine  Polacrilex Gum 4 milliGRAM(s) Oral five times a day PRN NRT  OLANZapine Injectable 10 milliGRAM(s) IntraMuscular once PRN severe agitation  OLANZapine Injectable 10 milliGRAM(s) IntraMuscular once PRN severe agitation

## 2023-02-15 NOTE — BH INPATIENT PSYCHIATRY PROGRESS NOTE - NSTXDCOPNODATETRGT_PSY_ALL_CORE
15-Feb-2023
31-Jan-2023
15-Feb-2023
15-Feb-2023
31-Jan-2023
31-Jan-2023
15-Feb-2023
31-Jan-2023
31-Jan-2023
15-Feb-2023
31-Jan-2023

## 2023-02-15 NOTE — BH INPATIENT PSYCHIATRY DISCHARGE NOTE - VIOLENCE RISK FACTORS:
Feeling of being under threat and being unable to control threat/Violent ideation/threat/speech/Impulsivity

## 2023-02-15 NOTE — BH INPATIENT PSYCHIATRY PROGRESS NOTE - NSICDXBHPRIMARYDX_PSY_ALL_CORE
Schizophrenia   F20.9  

## 2023-02-15 NOTE — BH INPATIENT PSYCHIATRY PROGRESS NOTE - MSE OPTIONS
Unstructured MSE
Unstructured MSE
[FreeTextEntry2] : a new complaint to her left knee.
Unstructured MSE

## 2023-02-15 NOTE — BH INPATIENT PSYCHIATRY PROGRESS NOTE - NSBHATTESTTYPEVISIT_PSY_A_CORE
Attending Only
Attending with Resident/Fellow/Student

## 2023-02-15 NOTE — BH INPATIENT PSYCHIATRY PROGRESS NOTE - NSCGISEVERILLNESS_PSY_ALL_CORE
7 = Among the most extremely ill patients – pathology drastically interferes in many life functions; may be hospitalized 4 = Moderately ill – overt symptoms causing noticeable, but modest, functional impairment or distress; symptom level may warrant medication

## 2023-02-15 NOTE — BH INPATIENT PSYCHIATRY PROGRESS NOTE - NSTXPSYCHODATETRGT_PSY_ALL_CORE
31-Jan-2023
13-Feb-2023
20-Feb-2023
31-Jan-2023
06-Feb-2023
15-Feb-2023
20-Feb-2023
06-Feb-2023
13-Feb-2023
31-Jan-2023
13-Feb-2023
30-Jan-2023
13-Feb-2023
13-Feb-2023
06-Feb-2023
31-Jan-2023

## 2023-02-15 NOTE — BH INPATIENT PSYCHIATRY PROGRESS NOTE - NSBHASSESSSUMMFT_PSY_ALL_CORE
Patient is a 21 yo single  male domiciled with mother in [place], employed with [employer], with documented history of mental illness (schizoaffective disorder, bipolar type, cannabis and alcohol use disorder) and unclear PMH who was bib ems activated by mother on account of worsening verbal aggression and auditory hallucinations in context of treatment noncompliance.    On initial assessment, patient presents w/ symptoms of psychosis, including prominent and pervasive paranoid/persecutory delusions related to his neighbors, co-workers, and mother in the setting of treatment non-adherence. Paranoia accompanied by agitation, aggression (per collateral provider), violent ideation, and A/H. Patient denies S/I and H/I. Patient's severe paranoia, limited insight, and poor judgment render him an acute risk to himself and others. He would benefit from inpatient hospitalization for safety, stabilization, and medication optimization. Routine observation appropriate. Will start perphenazine 4mg qhs -- a more weight-neutral agent -- due to obese BMI. Of note, patient appears to have gynecomastia (w/ documented history of successful risperidone trial; despite success, would not initiate this agent at this time as patient reports feeling "feminized"). Will add Klonopin 0.5mg BID for psychotic anxiety. Due to patient's stated disinterest in psychotropic medications will hold off on re-starting Depakote, which, per chart, patient had been successful on during previous admission.     Patient w/ significant improvements in psychosis, mood, and behavioral control. Attributes improvements to medication; insight much improved. Importance of medication adherence emphasized. Due to improvements in psychiatric symptoms and behavioral control, patient no longer warrants inpatient hospitalization and is appropriate for discharge w/ outpatient f/u. Discharged w/ 2-week supply of aripiprazole 20mg. S/p Aristada 882mg IM 2/10; next due 3/10.     Pt remains a high chronic risk of harm to self due to primary psychosis, hx of prior hospitalizations, hx of chronic non-adherence to meds and outpt treatment, limited social supports. Acute risk factors include psychosis in the setting of non-adherence to meds, now treated with inpatient hospitalization. Other protective factors that mitigate acute risks include pt recently consistently denied S/I/I/P (as well as H/I/I/P), he is hopeful and future-oriented (with plans to return to work), he does not have access to a gun, he is now motivated to further engage in outpt pt treatment and has been compliant with meds, he has received an DELONG, and he is able to engage in safety planning. Pt (and family) are agreeable to contact outpt providers, call 911, or go to the nearest ED with any imminent safety concerns for self or others. Given the above, patient does not appear to constitute imminent threat to self or others and is appropriate for discharge.

## 2023-02-15 NOTE — BH DISCHARGE NOTE NURSING/SOCIAL WORK/PSYCH REHAB - NSDCPRRECOMMEND_PSY_ALL_CORE
Psychiatric Rehabilitation staff recommends that patient begins outpatient treatment for ongoing medication management, support, and psychotherapy. In addition to, continuing exploring and utilizing healthy coping strategies for improved symptom management and sustained recovery.

## 2023-02-15 NOTE — BH INPATIENT PSYCHIATRY PROGRESS NOTE - NSTXPSYCHOINTERMD_PSY_ALL_CORE
antipsychotic trial and reality testing 

## 2023-02-15 NOTE — BH DISCHARGE NOTE NURSING/SOCIAL WORK/PSYCH REHAB - PATIENT PORTAL LINK FT
You can access the FollowMyHealth Patient Portal offered by [facility] by registering at the following website: [email or web address]. By joining [facility]’s FollowMyHealth portal, you will also be able to view your health information using other applications (apps) compatible with our system.

## 2023-02-15 NOTE — BH DISCHARGE NOTE NURSING/SOCIAL WORK/PSYCH REHAB - NSBHDCAGENCY1FT_PSY_A_CORE
[facility]/Cindy Moore and Dr. Su

## 2023-02-15 NOTE — BH DISCHARGE NOTE NURSING/SOCIAL WORK/PSYCH REHAB - NSDCPRGOAL_PSY_ALL_CORE
Pt has demonstrated significant progress towards psychiatric rehabilitation goals during the current hospitalization. Pt is able to identify listening to music, playing videogames, going to work, taking a cold shower, taking a nap, and having coffee/tea as healthy coping strategies to better manage symptoms. Pt endorses improvements in mood, sleep, appetite, and thought processing. Pt is looking forward to discharge and hopes to return to work soon. Pt denies any current suicidal ideations, intent, or plan. Pt engaged in safety planning, which can be reviewed in the  Safety Plan document. Pt was able to identify various warning signs, coping skills, and social supports to utilize after discharge. Pt has been compliant with medications and is tolerating them well. Pt was receptive to receiving DELONG medication to ensure better compliance after discharge. During the current hospitalization, pt was somewhat receptive to skill development. Pt attended approximately 80% of daily psychiatric rehabilitation groups. Pt was verbal in groups and participated appropriately. Pt did not require any redirection and is no longer a behavioral management issue on the unit. Pt was visible on the milieu and demonstrated an increase in positive socialization with select peers. Pt is able to verbalize thoughts, needs, and feelings with encouragement. Pt demonstrates improving insight and judgement into symptoms and treatment. Pt was provided with a [employer] survey to complete prior to discharge.

## 2023-02-15 NOTE — BH DISCHARGE NOTE NURSING/SOCIAL WORK/PSYCH REHAB - NSBHDCADDR1FT_A_CORE
Your appointment is via Zoom . You will be contacted by phone to register you for your appointments, Please answer all phone callws even if you don't recognize the number. You will be contacted with Zoom meeting ID or Zoom link.  Please find a private place to participant in you appointments. Please enter your Zoom meetings on time.   The appointment is in person at [phone] [place] [id number]. Please enter through Door #3.  Please inform patient of the followin) Patient must respond to call from Centers team to register by phone prior to scheduled appointment time in order for appointment to take place.    2) Patient must arrive 15-20 minutes early to the appointment.   3) For questions needing immediate assistance, please call our  at [phone].  For non-urgent ETP program questions, please leave a voicemail on our ETP program line and an ETP psychologist will return your call within one business day.

## 2023-02-15 NOTE — BH INPATIENT PSYCHIATRY PROGRESS NOTE - NSBHCONTPROVIDER_PSY_ALL_CORE
Not applicable

## 2023-02-15 NOTE — BH INPATIENT PSYCHIATRY PROGRESS NOTE - NSTXDCOPNOPROGRES_PSY_ALL_CORE
No Change

## 2023-02-15 NOTE — BH INPATIENT PSYCHIATRY PROGRESS NOTE - NSBHCHARTREVIEWVS_PSY_A_CORE FT
Vital Signs Last 24 Hrs  T(C): 35.9 (02-08-23 @ 07:16), Max: 35.9 (02-08-23 @ 07:16)  T(F): 96.6 (02-08-23 @ 07:16), Max: 96.6 (02-08-23 @ 07:16)  HR: 92 (02-08-23 @ 07:16) (92 - 92)  BP: 140/88 (02-08-23 @ 07:16) (140/88 - 140/88)  BP(mean): --  RR: --  SpO2: --    
Vital Signs Last 24 Hrs  T(C): 36.6 (01-29-23 @ 06:47), Max: 36.6 (01-29-23 @ 06:47)  T(F): 97.8 (01-29-23 @ 06:47), Max: 97.8 (01-29-23 @ 06:47)  HR: --  BP: --  BP(mean): --  RR: --  SpO2: --    Orthostatic VS  01-29-23 @ 06:47  Lying BP: --/-- HR: --  Sitting BP: 133/74 HR: 88  Standing BP: 128/78 HR: 90  Site: --  Mode: --  Orthostatic VS  01-28-23 @ 07:58  Lying BP: --/-- HR: --  Sitting BP: 134/81 HR: 97  Standing BP: --/-- HR: --  Site: --  Mode: --  
Vital Signs Last 24 Hrs  T(C): 36.3 (02-07-23 @ 07:42), Max: 36.3 (02-07-23 @ 07:42)  T(F): 97.4 (02-07-23 @ 07:42), Max: 97.4 (02-07-23 @ 07:42)  HR: 90 (02-07-23 @ 07:42) (90 - 90)  BP: 144/93 (02-07-23 @ 07:42) (144/93 - 144/93)  BP(mean): --  RR: --  SpO2: --    
Vital Signs Last 24 Hrs  T(C): 36 (02-01-23 @ 05:57), Max: 36 (02-01-23 @ 05:57)  T(F): 96.8 (02-01-23 @ 05:57), Max: 96.8 (02-01-23 @ 05:57)  HR: --  BP: --  BP(mean): --  RR: --  SpO2: --    Orthostatic VS  02-01-23 @ 05:57  Lying BP: --/-- HR: --  Sitting BP: 126/84 HR: 87  Standing BP: --/-- HR: --  Site: --  Mode: --  
Vital Signs Last 24 Hrs  T(C): 35.9 (02-10-23 @ 08:21), Max: 35.9 (02-10-23 @ 08:21)  T(F): 96.7 (02-10-23 @ 08:21), Max: 96.7 (02-10-23 @ 08:21)  HR: --  BP: --  BP(mean): --  RR: --  SpO2: --    Orthostatic VS  02-10-23 @ 08:21  Lying BP: --/-- HR: --  Sitting BP: 154/75 HR: 95  Standing BP: 151/106 HR: 88  Site: upper left arm  Mode: electronic  
Vital Signs Last 24 Hrs  T(C): 36.6 (01-31-23 @ 06:24), Max: 36.6 (01-31-23 @ 06:24)  T(F): 97.8 (01-31-23 @ 06:24), Max: 97.8 (01-31-23 @ 06:24)  HR: 79 (01-31-23 @ 06:24) (79 - 79)  BP: 138/73 (01-31-23 @ 06:24) (138/73 - 138/73)  BP(mean): --  RR: --  SpO2: --    
Vital Signs Last 24 Hrs  T(C): 36.4 (02-02-23 @ 06:28), Max: 36.4 (02-02-23 @ 06:28)  T(F): 97.6 (02-02-23 @ 06:28), Max: 97.6 (02-02-23 @ 06:28)  HR: 88 (02-02-23 @ 06:28) (88 - 88)  BP: 154/88 (02-02-23 @ 06:28) (154/88 - 154/88)  BP(mean): --  RR: --  SpO2: --    Orthostatic VS  02-01-23 @ 05:57  Lying BP: --/-- HR: --  Sitting BP: 126/84 HR: 87  Standing BP: --/-- HR: --  Site: --  Mode: --  
Vital Signs Last 24 Hrs  T(C): 36.6 (01-25-23 @ 08:34), Max: 36.8 (01-24-23 @ 19:39)  T(F): 97.9 (01-25-23 @ 08:34), Max: 98.3 (01-24-23 @ 19:39)  HR: --  BP: --  BP(mean): --  RR: 18 (01-24-23 @ 19:39) (18 - 18)  SpO2: --    Orthostatic VS  01-25-23 @ 08:34  Lying BP: --/-- HR: --  Sitting BP: 118/67 HR: 74  Standing BP: 112/74 HR: 112  Site: --  Mode: --  Orthostatic VS  01-24-23 @ 19:39  Lying BP: --/-- HR: --  Sitting BP: 142/66 HR: 79  Standing BP: 139/56 HR: 100  Site: --  Mode: --  Orthostatic VS  01-24-23 @ 07:47  Lying BP: 131/68 HR: 71  Sitting BP: 118/74 HR: 96  Standing BP: --/-- HR: --  Site: --  Mode: --  
Vital Signs Last 24 Hrs  T(C): 36.3 (01-24-23 @ 07:47), Max: 36.8 (01-23-23 @ 13:34)  T(F): 97.4 (01-24-23 @ 07:47), Max: 98.2 (01-23-23 @ 13:34)  HR: 65 (01-23-23 @ 13:34) (65 - 65)  BP: 125/68 (01-23-23 @ 13:34) (125/68 - 125/68)  BP(mean): --  RR: 18 (01-23-23 @ 13:34) (18 - 18)  SpO2: 99% (01-23-23 @ 13:34) (99% - 99%)    Orthostatic VS  01-24-23 @ 07:47  Lying BP: 131/68 HR: 71  Sitting BP: 118/74 HR: 96  Standing BP: --/-- HR: --  Site: --  Mode: --  Orthostatic VS  01-23-23 @ 15:12  Lying BP: --/-- HR: --  Sitting BP: 123/81 HR: 90  Standing BP: 134/88 HR: 104  Site: --  Mode: --  
Vital Signs Last 24 Hrs  T(C): 36.3 (02-13-23 @ 07:53), Max: 36.3 (02-13-23 @ 07:53)  T(F): 97.3 (02-13-23 @ 07:53), Max: 97.3 (02-13-23 @ 07:53)  HR: 100 (02-13-23 @ 07:53) (100 - 100)  BP: 142/87 (02-13-23 @ 07:53) (142/87 - 142/87)  BP(mean): --  RR: --  SpO2: --    Orthostatic VS  02-12-23 @ 06:25  Lying BP: --/-- HR: --  Sitting BP: 139/78 HR: 81  Standing BP: 146/75 HR: 90  Site: --  Mode: --  
Vital Signs Last 24 Hrs  T(C): 36.7 (02-03-23 @ 06:17), Max: 36.7 (02-03-23 @ 06:17)  T(F): 98 (02-03-23 @ 06:17), Max: 98 (02-03-23 @ 06:17)  HR: 78 (02-03-23 @ 06:17) (78 - 78)  BP: 121/72 (02-03-23 @ 06:17) (121/72 - 121/72)  BP(mean): --  RR: --  SpO2: --    
Vital Signs Last 24 Hrs  T(C): 36.4 (02-15-23 @ 08:23), Max: 36.4 (02-15-23 @ 08:23)  T(F): 97.6 (02-15-23 @ 08:23), Max: 97.6 (02-15-23 @ 08:23)  HR: 82 (02-15-23 @ 08:23) (82 - 82)  BP: 133/76 (02-15-23 @ 08:23) (133/76 - 133/76)  BP(mean): --  RR: --  SpO2: --    Orthostatic VS  02-14-23 @ 08:33  Lying BP: --/-- HR: --  Sitting BP: 122/72 HR: 78  Standing BP: --/-- HR: --  Site: --  Mode: --  Orthostatic VS  02-14-23 @ 06:26  Lying BP: --/-- HR: --  Sitting BP: 122/72 HR: 78  Standing BP: --/-- HR: --  Site: upper left arm  Mode: electronic  
Vital Signs Last 24 Hrs  T(C): 36.6 (02-09-23 @ 08:14), Max: 36.6 (02-09-23 @ 08:14)  T(F): 97.9 (02-09-23 @ 08:14), Max: 97.9 (02-09-23 @ 08:14)  HR: 86 (02-09-23 @ 08:14) (86 - 86)  BP: 143/82 (02-09-23 @ 08:14) (143/82 - 143/82)  BP(mean): --  RR: --  SpO2: --    
Vital Signs Last 24 Hrs  T(C): 36.4 (01-30-23 @ 06:39), Max: 36.4 (01-30-23 @ 06:39)  T(F): 97.6 (01-30-23 @ 06:39), Max: 97.6 (01-30-23 @ 06:39)  HR: 91 (01-30-23 @ 06:39) (91 - 91)  BP: 151/81 (01-30-23 @ 06:39) (151/81 - 151/81)  BP(mean): --  RR: --  SpO2: --    Orthostatic VS  01-29-23 @ 06:47  Lying BP: --/-- HR: --  Sitting BP: 133/74 HR: 88  Standing BP: 128/78 HR: 90  Site: --  Mode: --  
Vital Signs Last 24 Hrs  T(C): 36.1 (01-26-23 @ 07:48), Max: 36.1 (01-26-23 @ 07:48)  T(F): 96.9 (01-26-23 @ 07:48), Max: 96.9 (01-26-23 @ 07:48)  HR: --  BP: --  BP(mean): --  RR: --  SpO2: --    Orthostatic VS  01-26-23 @ 07:48  Lying BP: --/-- HR: --  Sitting BP: 122/75 HR: 102  Standing BP: 138/70 HR: 126  Site: --  Mode: --  Orthostatic VS  01-25-23 @ 08:34  Lying BP: --/-- HR: --  Sitting BP: 118/67 HR: 74  Standing BP: 112/74 HR: 112  Site: --  Mode: --  Orthostatic VS  01-24-23 @ 19:39  Lying BP: --/-- HR: --  Sitting BP: 142/66 HR: 79  Standing BP: 139/56 HR: 100  Site: --  Mode: --  
Vital Signs Last 24 Hrs  T(C): 36.2 (02-06-23 @ 07:04), Max: 36.2 (02-06-23 @ 07:04)  T(F): 97.1 (02-06-23 @ 07:04), Max: 97.1 (02-06-23 @ 07:04)  HR: 84 (02-06-23 @ 07:04) (84 - 84)  BP: 135/79 (02-06-23 @ 07:04) (135/79 - 135/79)  BP(mean): --  RR: --  SpO2: --    Orthostatic VS  02-05-23 @ 07:59  Lying BP: --/-- HR: --  Sitting BP: 146/85 HR: 85  Standing BP: --/-- HR: --  Site: --  Mode: --  
Vital Signs Last 24 Hrs  T(C): 36.5 (02-14-23 @ 08:33), Max: 36.5 (02-14-23 @ 06:26)  T(F): 97.7 (02-14-23 @ 08:33), Max: 97.7 (02-14-23 @ 06:26)  HR: --  BP: --  BP(mean): --  RR: --  SpO2: --    Orthostatic VS  02-14-23 @ 08:33  Lying BP: --/-- HR: --  Sitting BP: 122/72 HR: 78  Standing BP: --/-- HR: --  Site: --  Mode: --  Orthostatic VS  02-14-23 @ 06:26  Lying BP: --/-- HR: --  Sitting BP: 122/72 HR: 78  Standing BP: --/-- HR: --  Site: upper left arm  Mode: electronic  
Vital Signs Last 24 Hrs  T(C): 36.8 (01-27-23 @ 06:06), Max: 36.8 (01-27-23 @ 06:06)  T(F): 98.3 (01-27-23 @ 06:06), Max: 98.3 (01-27-23 @ 06:06)  HR: 88 (01-27-23 @ 06:06) (88 - 88)  BP: 130/80 (01-27-23 @ 06:06) (130/80 - 130/80)  BP(mean): --  RR: --  SpO2: --    Orthostatic VS  01-26-23 @ 07:48  Lying BP: --/-- HR: --  Sitting BP: 122/75 HR: 102  Standing BP: 138/70 HR: 126  Site: --  Mode: --

## 2023-02-15 NOTE — BH DISCHARGE NOTE NURSING/SOCIAL WORK/PSYCH REHAB - NSCDUDCCRISIS_PSY_A_CORE
[facility]  1 [phone] [facility] ([phone])  Text "WELL" to 75389  Website: www.[employer].icomasoft/.National Suicide Prevention Lifeline 9 (471) 934-5093/.  [facility]  [phone] [place] [phone]   Hours:  Monday through Friday from 9 AM to 3 PM

## 2023-02-15 NOTE — BH INPATIENT PSYCHIATRY PROGRESS NOTE - NSBHFUPINTERVALHXFT_PSY_A_CORE
Patient seen for follow up of psychosis. Chart reviewed and case discussed with treatment team. Adherent w/ medications, good sleep, in good behavioral control overnight. Reports good mood and sleep. Excited for discharge today. Denies any anxiety. No paranoia or other overt psychotic symptoms. Not concerned about returning home to mother/being near neighbors. Feels more in control. Importance of medication adherence emphasized. No hopelessness, S/I, V/I, or H/I. Aware he can call 911 or go to nearest ED if he becomes acutely distressed.  Patient seen for follow up of psychosis and discharge day management . Chart reviewed and case discussed with treatment team. Adherent w/ medications, good sleep, in good behavioral control overnight. Reports good mood and sleep. Excited for discharge today. Denies any anxiety. No paranoia or other overt psychotic symptoms. Not concerned about returning home to mother/being near neighbors. Feels more in control. Importance of medication adherence emphasized. No hopelessness, S/I, V/I, or H/I. Aware he can call 911 or go to nearest ED if he becomes acutely distressed.

## 2023-02-16 PROCEDURE — 90791 PSYCH DIAGNOSTIC EVALUATION: CPT | Mod: 95

## 2023-03-08 DIAGNOSIS — F25.1 SCHIZOAFFECTIVE DISORDER, DEPRESSIVE TYPE: ICD-10-CM

## 2023-03-08 DIAGNOSIS — F12.11 CANNABIS ABUSE, IN REMISSION: ICD-10-CM

## 2024-07-12 NOTE — BH INPATIENT PSYCHIATRY PROGRESS NOTE - NSBHASSESSSUMMFT_PSY_ALL_CORE
20 y/o young man, single, unemployed, living with mother and cousin; no known PMH; PPhx of schizoaffective disorder and cannabis use disorder, [facility], no past suicide attempts, no past inpatient hospitalizations, previous trials on Abilify 5 mg and risperidone 1 mg BID although self-d/c'ed (patient had relief on risperidone), history of marijuana use with last use one year ago, history of aggression toward property referred from [facility] for inability to care for self 2/2 psychotic sxs and imminent risk of harm 2/2 poor judgment from psychotic sxs.    The patient is less anxious, is engaged. Denies "anger issues" as reported on admission He is less pre-occupied with theories regarding his ex-girlfriend and how she influences his Sx. He did not reference flying monkeys until asked; also denied feeling threatened by them now. He is refusing DELONG. Discharge tomorrow.    Plan:  1.	Legal: pt would benefit from VOL 9.13 status  2.	Safety: routine obs appropriate as pt denying active SI/HI; Zyprexa 5mg PO q6h PRN agitation, ativan 2mg q6h PO  PRN agitation, Zyprexa 10mg IM PRN severe agitation  3.	Psychiatric: Risperdal 2mg BID, Depakote DR 500mg AM, 750mg HS; Ativan 0.25mg TID; melatonin 5mg; trazodone 100mg PRN for insomnia    4.	G/M therapy   5.	Medical: no acute issues; F/u basic AM labs and EKG  6.	Collateral/Dispo: Dispo when stable; Collateral was obtained from Dr. Blanchard in [facility]

## 2024-11-22 NOTE — ED ADULT TRIAGE NOTE - NS_BH TRG QUESTION2_ED_ALL_ED
Attempted to call pt on phone number listed, no answer, voicemail message left with return phone number.  
Agitated/uncooperative BUT NOT combative/assaultive